# Patient Record
Sex: FEMALE | Race: ASIAN | NOT HISPANIC OR LATINO | URBAN - METROPOLITAN AREA
[De-identification: names, ages, dates, MRNs, and addresses within clinical notes are randomized per-mention and may not be internally consistent; named-entity substitution may affect disease eponyms.]

---

## 2021-03-16 ENCOUNTER — EMERGENCY (EMERGENCY)
Facility: HOSPITAL | Age: 46
LOS: 1 days | Discharge: HOME | End: 2021-03-16
Attending: EMERGENCY MEDICINE | Admitting: EMERGENCY MEDICINE
Payer: COMMERCIAL

## 2021-03-16 VITALS
HEART RATE: 99 BPM | TEMPERATURE: 98 F | SYSTOLIC BLOOD PRESSURE: 137 MMHG | OXYGEN SATURATION: 99 % | RESPIRATION RATE: 18 BRPM | DIASTOLIC BLOOD PRESSURE: 92 MMHG

## 2021-03-16 VITALS
TEMPERATURE: 99 F | HEIGHT: 64 IN | WEIGHT: 119.93 LBS | DIASTOLIC BLOOD PRESSURE: 99 MMHG | RESPIRATION RATE: 18 BRPM | OXYGEN SATURATION: 100 % | HEART RATE: 111 BPM | SYSTOLIC BLOOD PRESSURE: 142 MMHG

## 2021-03-16 LAB
APPEARANCE UR: CLEAR — SIGNIFICANT CHANGE UP
BACTERIA # UR AUTO: PRESENT /HPF
BILIRUB UR-MCNC: NEGATIVE — SIGNIFICANT CHANGE UP
COLOR SPEC: YELLOW — SIGNIFICANT CHANGE UP
DIFF PNL FLD: NEGATIVE — SIGNIFICANT CHANGE UP
EPI CELLS # UR: ABNORMAL /HPF (ref 0–5)
GLUCOSE UR QL: NEGATIVE — SIGNIFICANT CHANGE UP
HCG UR QL: NEGATIVE — SIGNIFICANT CHANGE UP
HYALINE CASTS # UR AUTO: SIGNIFICANT CHANGE UP /LPF (ref 0–2)
KETONES UR-MCNC: ABNORMAL MG/DL
LEUKOCYTE ESTERASE UR-ACNC: NEGATIVE — SIGNIFICANT CHANGE UP
NITRITE UR-MCNC: NEGATIVE — SIGNIFICANT CHANGE UP
PH UR: 7 — SIGNIFICANT CHANGE UP (ref 5–8)
PROT UR-MCNC: ABNORMAL MG/DL
RBC CASTS # UR COMP ASSIST: < 5 /HPF — SIGNIFICANT CHANGE UP
SP GR SPEC: 1.02 — SIGNIFICANT CHANGE UP (ref 1–1.03)
UROBILINOGEN FLD QL: 0.2 E.U./DL — SIGNIFICANT CHANGE UP
WBC UR QL: < 5 /HPF — SIGNIFICANT CHANGE UP

## 2021-03-16 PROCEDURE — 76856 US EXAM PELVIC COMPLETE: CPT | Mod: 26,59

## 2021-03-16 PROCEDURE — 76830 TRANSVAGINAL US NON-OB: CPT | Mod: 26

## 2021-03-16 PROCEDURE — 99285 EMERGENCY DEPT VISIT HI MDM: CPT

## 2021-03-16 RX ORDER — KETOROLAC TROMETHAMINE 30 MG/ML
30 SYRINGE (ML) INJECTION ONCE
Refills: 0 | Status: DISCONTINUED | OUTPATIENT
Start: 2021-03-16 | End: 2021-03-16

## 2021-03-16 RX ORDER — OXYCODONE AND ACETAMINOPHEN 5; 325 MG/1; MG/1
1 TABLET ORAL
Qty: 12 | Refills: 0
Start: 2021-03-16 | End: 2021-03-18

## 2021-03-16 RX ORDER — OXYCODONE AND ACETAMINOPHEN 5; 325 MG/1; MG/1
1 TABLET ORAL ONCE
Refills: 0 | Status: DISCONTINUED | OUTPATIENT
Start: 2021-03-16 | End: 2021-03-16

## 2021-03-16 RX ORDER — ONDANSETRON 8 MG/1
4 TABLET, FILM COATED ORAL ONCE
Refills: 0 | Status: COMPLETED | OUTPATIENT
Start: 2021-03-16 | End: 2021-03-16

## 2021-03-16 RX ORDER — IBUPROFEN 200 MG
1 TABLET ORAL
Qty: 21 | Refills: 0
Start: 2021-03-16 | End: 2021-03-22

## 2021-03-16 RX ADMIN — Medication 30 MILLIGRAM(S): at 12:57

## 2021-03-16 RX ADMIN — OXYCODONE AND ACETAMINOPHEN 1 TABLET(S): 5; 325 TABLET ORAL at 13:35

## 2021-03-16 RX ADMIN — Medication 30 MILLIGRAM(S): at 13:35

## 2021-03-16 RX ADMIN — OXYCODONE AND ACETAMINOPHEN 1 TABLET(S): 5; 325 TABLET ORAL at 12:57

## 2021-03-16 RX ADMIN — ONDANSETRON 4 MILLIGRAM(S): 8 TABLET, FILM COATED ORAL at 16:40

## 2021-03-16 NOTE — ED PROVIDER NOTE - CARE PROVIDER_API CALL
Raquel Lomas)  Gynecologic Oncology; Obstetrics and Gynecology  [address] [place] [address]  Phone: [phone]  Fax: [phone]  Follow Up Time: 4-6 Days

## 2021-03-16 NOTE — ED PROVIDER NOTE - PHYSICAL EXAMINATION
VITAL SIGNS: I have reviewed nursing notes and confirm.  CONSTITUTIONAL: Pt tearful; in no acute distress.   SKIN:  warm and dry, no acute rash.   HEAD:  normocephalic, atraumatic.  EYES: EOM intact; conjunctiva and sclera clear.  ENT: No nasal discharge; airway clear.   BACK: No midline spinal tenderness.   CARD: S1, S2 normal; no murmurs, gallops, or rubs. Regular rate and rhythm.   RESP:  Clear to auscultation b/l, no wheezes, rales or rhonchi.  ABD: Normal bowel sounds; soft; non-distended; non-tender; no guarding/ rebound.  EXT: Normal ROM. No clubbing, cyanosis or edema. 2+ pulses to b/l ue/le.  NEURO: Alert, oriented, grossly unremarkable. Normal sensation and strength in LE.   PSYCH: Cooperative, mood and affect appropriate.

## 2021-03-16 NOTE — ED PROVIDER NOTE - NSFOLLOWUPINSTRUCTIONS_ED_ALL_ED_FT
Endometriosis    WHAT YOU NEED TO KNOW:    Endometriosis is a condition in which tissue that is normally only in your uterus grows outside of the uterus. Endometriosis causes tissue that should be shed during a monthly period to grow on your ovaries, fallopian tubes, bladder, or other organs. Organs and tissue may stick together and cause inflammation and pain.    DISCHARGE INSTRUCTIONS:    Return to the emergency department if:   •You have severe pain that does not go away after you take pain medicine.          Contact your healthcare provider if:   •Your symptoms return after treatment.      •You have heavy or unusual vaginal bleeding.      •You see blood in your urine or bowel movement.      •You have questions or concerns about your condition or care.      Medicines:   •Hormones may help shrink endometrial tissue and decrease pain and inflammation. You may be given birth control pills, androgen hormones, or medicine that makes your body produce less of certain hormones.       •Acetaminophen decreases pain and is available without a doctor's order. Ask how much to take and how often to take it. Follow directions. Acetaminophen can cause liver damage if not taken correctly.      •NSAIDs, such as ibuprofen, help decrease swelling, pain, and fever. This medicine is available with or without a doctor's order. NSAIDs can cause stomach bleeding or kidney problems in certain people. If you take blood thinner medicine, always ask your healthcare provider if NSAIDs are safe for you. Always read the medicine label and follow directions.      •Take your medicine as directed. Contact your healthcare provider if you think your medicine is not helping or if you have side effects. Tell him or her if you are allergic to any medicine. Keep a list of the medicines, vitamins, and herbs you take. Include the amounts, and when and why you take them. Bring the list or the pill bottles to follow-up visits. Carry your medicine list with you in case of an emergency.      Self-care:   •Apply heat on your abdomen for 20 to 30 minutes every 2 hours for as many days as directed. Heat helps decrease pain and muscle spasms.      •Exercise regularly to help reduce symptoms, such as pain. Ask about the best exercise plan for you.       Follow up with your healthcare provider as directed: Write down your questions so you remember to ask them during your visits.        © Copyright [employer] 2021           back to top                          © Copyright [employer] 2021

## 2021-03-16 NOTE — ED PROVIDER NOTE - CHPI ED SYMPTOMS NEG
Denies Hx of pain like this, Hx kidney stones, FHx kidney stones, fevers, cough, CP, numbness, weakness, allergies to meds, pain radiating down to legs.

## 2021-03-16 NOTE — ED ADULT NURSE NOTE - OBJECTIVE STATEMENT
44 y/o female c/o neck and lower back pain since yesterday. pt states " I have back pain issues, I had a massage yesterday and then my back and neck got worst" pt denies fall/injuries. pt also endorsed " she just came off quarantine from covid 19 yesterday, had an MRI schedule for he back pain issues but had to cancel it because of covid"

## 2021-03-16 NOTE — ED PROVIDER NOTE - CADM PMH OBGYN IS PREGNANT YN
Unknown

## 2021-03-16 NOTE — ED PROVIDER NOTE - NOTES
knows patient well, has been long for endometriosis.  had ultrasound in Feb showed small endometrioma.  suggests getting another ultrasound today.  mri of back/pelvis already ordered, pt is getting tomorrow.  will call her back after sono results

## 2021-03-16 NOTE — ED PROVIDER NOTE - PATIENT PORTAL LINK FT
You can access the FollowMyHealth Patient Portal offered by [facility] by registering at the following website: [email or web address]. By joining [facility]’s FollowMyHealth portal, you will also be able to view your health information using other applications (apps) compatible with our system.

## 2021-03-16 NOTE — ED PROVIDER NOTE - CLINICAL SUMMARY MEDICAL DECISION MAKING FREE TEXT BOX
46 y/o F PMHx COVID (Dx 3/6/21) presents to the ED c/o worsening lower back pain, neck pain, back cramps, headache and nausea. Plan is for oral percocet and IM Toradol. Will discuss case w/ pt's PMD/ GYN Dr. Raquel Lomas. 44 y/o F PMHx COVID (Dx 3/6/21) presents to the ED c/o worsening lower back pain, neck pain, back cramps, headache and nausea. Plan is for oral percocet and IM Toradol. Will discuss case w/ pt's PMD/ GYN Dr. Raquel Lomas.  sono shows same endometrioma, unchanged in size, no torsion  pt feels better, has mri scheduled for tomorrow

## 2021-03-16 NOTE — ED PROVIDER NOTE - OBJECTIVE STATEMENT
44 y/o F PMHx COVID (Dx 3/6/21) presents to the ED c/o worsening lower back pain, neck pain, back cramps, headache and nausea. The back pain started yesterday, and the neck pain started this morning. Pt has an appointment w/ her "back doctor" Dr. Brandon Pugh for an MRI tomorrow.  ?(Pt's GYN Dr. Raquel Lomas referred pt to get an MRI.)? Pt taking Advil, Tylenol for pain w/ minimal relief w/ last dose this morning. Pt toko Baclofen for muscle pain w/ no relief last dose 2-3 H ago. Pt does not like taking narcotics for pain relief because she gets nauseous. Denies Hx of pain like this, Hx kidney stones, FHx kidney stones, fevers, cough, CP, numbness, weakness, allergies to meds, pain radiating down to legs.

## 2021-03-16 NOTE — ED ADULT NURSE NOTE - NSIMPLEMENTINTERV_GEN_ALL_ED
Implemented All Universal Safety Interventions:  [place] to call system. Call bell, personal items and telephone within reach. Instruct patient to call for assistance. Room bathroom lighting operational. Non-slip footwear when patient is off stretcher. Physically safe environment: no spills, clutter or unnecessary equipment. Stretcher in lowest position, wheels locked, appropriate side rails in place.

## 2021-03-16 NOTE — ED PROVIDER NOTE - CARE PLAN
[At Term] : at term [Normal Vaginal Route] : by normal vaginal route [None] : No maternal complications [Passed] : passed Principal Discharge DX:	Acute midline low back pain without sciatica  Secondary Diagnosis:	Endometriosis of right ovary

## 2021-03-19 DIAGNOSIS — R51.9 HEADACHE, UNSPECIFIED: ICD-10-CM

## 2021-03-19 DIAGNOSIS — M54.2 CERVICALGIA: ICD-10-CM

## 2021-03-19 DIAGNOSIS — N80.1 ENDOMETRIOSIS OF OVARY: ICD-10-CM

## 2021-03-19 DIAGNOSIS — M54.5 LOW BACK PAIN: ICD-10-CM

## 2021-03-19 DIAGNOSIS — R11.0 NAUSEA: ICD-10-CM

## 2021-03-19 PROCEDURE — 76856 US EXAM PELVIC COMPLETE: CPT

## 2021-03-19 PROCEDURE — 76830 TRANSVAGINAL US NON-OB: CPT

## 2021-03-19 PROCEDURE — 81025 URINE PREGNANCY TEST: CPT

## 2021-03-19 PROCEDURE — 99284 EMERGENCY DEPT VISIT MOD MDM: CPT | Mod: 25

## 2021-03-19 PROCEDURE — 96372 THER/PROPH/DIAG INJ SC/IM: CPT

## 2021-03-19 PROCEDURE — 81001 URINALYSIS AUTO W/SCOPE: CPT

## 2022-06-16 NOTE — ED ADULT NURSE NOTE - PAIN: BODY LOCATION
Right femoral artery angiogram was performed  using a Sheath 6fr 10cm 2.5cm Intro Snap On Dil Lock Kink by hand injection.  The access site was deemed to be appropriate for closure.  neck/back

## 2023-10-16 ENCOUNTER — INPATIENT (INPATIENT)
Facility: HOSPITAL | Age: 48
LOS: 6 days | Discharge: HOME | DRG: 872 | End: 2023-10-23
Attending: SURGERY | Admitting: SURGERY
Payer: COMMERCIAL

## 2023-10-16 VITALS
TEMPERATURE: 99 F | HEART RATE: 128 BPM | SYSTOLIC BLOOD PRESSURE: 90 MMHG | RESPIRATION RATE: 18 BRPM | DIASTOLIC BLOOD PRESSURE: 63 MMHG | WEIGHT: 134.92 LBS | OXYGEN SATURATION: 97 % | HEIGHT: 64 IN

## 2023-10-16 DIAGNOSIS — Z90.49 ACQUIRED ABSENCE OF OTHER SPECIFIED PARTS OF DIGESTIVE TRACT: Chronic | ICD-10-CM

## 2023-10-16 PROBLEM — U07.1 COVID-19: Chronic | Status: ACTIVE | Noted: 2021-03-16

## 2023-10-16 LAB
ALBUMIN SERPL ELPH-MCNC: 3.9 G/DL — SIGNIFICANT CHANGE UP (ref 3.3–5)
ALBUMIN SERPL ELPH-MCNC: 3.9 G/DL — SIGNIFICANT CHANGE UP (ref 3.3–5)
ALP SERPL-CCNC: 135 U/L — HIGH (ref 40–120)
ALP SERPL-CCNC: 135 U/L — HIGH (ref 40–120)
ALT FLD-CCNC: 58 U/L — HIGH (ref 10–45)
ALT FLD-CCNC: 58 U/L — HIGH (ref 10–45)
AMPHET UR-MCNC: POSITIVE
AMPHET UR-MCNC: POSITIVE
ANION GAP SERPL CALC-SCNC: 21 MMOL/L — HIGH (ref 5–17)
ANION GAP SERPL CALC-SCNC: 21 MMOL/L — HIGH (ref 5–17)
ANISOCYTOSIS BLD QL: SLIGHT — SIGNIFICANT CHANGE UP
ANISOCYTOSIS BLD QL: SLIGHT — SIGNIFICANT CHANGE UP
APPEARANCE UR: CLEAR — SIGNIFICANT CHANGE UP
APPEARANCE UR: CLEAR — SIGNIFICANT CHANGE UP
APTT BLD: 31 SEC — SIGNIFICANT CHANGE UP (ref 24.5–35.6)
APTT BLD: 31 SEC — SIGNIFICANT CHANGE UP (ref 24.5–35.6)
APTT BLD: 31.2 SEC — SIGNIFICANT CHANGE UP (ref 24.5–35.6)
APTT BLD: 31.2 SEC — SIGNIFICANT CHANGE UP (ref 24.5–35.6)
AST SERPL-CCNC: 90 U/L — HIGH (ref 10–40)
AST SERPL-CCNC: 90 U/L — HIGH (ref 10–40)
BACTERIA # UR AUTO: PRESENT /HPF
BACTERIA # UR AUTO: PRESENT /HPF
BARBITURATES UR SCN-MCNC: NEGATIVE — SIGNIFICANT CHANGE UP
BARBITURATES UR SCN-MCNC: NEGATIVE — SIGNIFICANT CHANGE UP
BASE EXCESS BLDV CALC-SCNC: -2 MMOL/L — SIGNIFICANT CHANGE UP (ref -2–3)
BASE EXCESS BLDV CALC-SCNC: -2 MMOL/L — SIGNIFICANT CHANGE UP (ref -2–3)
BASOPHILS # BLD AUTO: 0 K/UL — SIGNIFICANT CHANGE UP (ref 0–0.2)
BASOPHILS # BLD AUTO: 0 K/UL — SIGNIFICANT CHANGE UP (ref 0–0.2)
BASOPHILS NFR BLD AUTO: 0 % — SIGNIFICANT CHANGE UP (ref 0–2)
BASOPHILS NFR BLD AUTO: 0 % — SIGNIFICANT CHANGE UP (ref 0–2)
BENZODIAZ UR-MCNC: NEGATIVE — SIGNIFICANT CHANGE UP
BENZODIAZ UR-MCNC: NEGATIVE — SIGNIFICANT CHANGE UP
BILIRUB DIRECT SERPL-MCNC: 0.9 MG/DL — HIGH (ref 0–0.3)
BILIRUB DIRECT SERPL-MCNC: 0.9 MG/DL — HIGH (ref 0–0.3)
BILIRUB INDIRECT FLD-MCNC: 1.6 MG/DL — HIGH (ref 0.2–1)
BILIRUB INDIRECT FLD-MCNC: 1.6 MG/DL — HIGH (ref 0.2–1)
BILIRUB SERPL-MCNC: 2.5 MG/DL — HIGH (ref 0.2–1.2)
BILIRUB UR-MCNC: ABNORMAL
BILIRUB UR-MCNC: ABNORMAL
BLD GP AB SCN SERPL QL: NEGATIVE — SIGNIFICANT CHANGE UP
BLD GP AB SCN SERPL QL: NEGATIVE — SIGNIFICANT CHANGE UP
BUN SERPL-MCNC: 13 MG/DL — SIGNIFICANT CHANGE UP (ref 7–23)
BUN SERPL-MCNC: 13 MG/DL — SIGNIFICANT CHANGE UP (ref 7–23)
CA-I SERPL-SCNC: 1.18 MMOL/L — SIGNIFICANT CHANGE UP (ref 1.15–1.33)
CA-I SERPL-SCNC: 1.18 MMOL/L — SIGNIFICANT CHANGE UP (ref 1.15–1.33)
CALCIUM SERPL-MCNC: 9.4 MG/DL — SIGNIFICANT CHANGE UP (ref 8.4–10.5)
CALCIUM SERPL-MCNC: 9.4 MG/DL — SIGNIFICANT CHANGE UP (ref 8.4–10.5)
CHLORIDE SERPL-SCNC: 102 MMOL/L — SIGNIFICANT CHANGE UP (ref 96–108)
CHLORIDE SERPL-SCNC: 102 MMOL/L — SIGNIFICANT CHANGE UP (ref 96–108)
CO2 BLDV-SCNC: 24.3 MMOL/L — SIGNIFICANT CHANGE UP (ref 22–26)
CO2 BLDV-SCNC: 24.3 MMOL/L — SIGNIFICANT CHANGE UP (ref 22–26)
CO2 SERPL-SCNC: 19 MMOL/L — LOW (ref 22–31)
CO2 SERPL-SCNC: 19 MMOL/L — LOW (ref 22–31)
COCAINE METAB.OTHER UR-MCNC: NEGATIVE — SIGNIFICANT CHANGE UP
COCAINE METAB.OTHER UR-MCNC: NEGATIVE — SIGNIFICANT CHANGE UP
COLOR SPEC: YELLOW — SIGNIFICANT CHANGE UP
COLOR SPEC: YELLOW — SIGNIFICANT CHANGE UP
CREAT SERPL-MCNC: 1.01 MG/DL — SIGNIFICANT CHANGE UP (ref 0.5–1.3)
CREAT SERPL-MCNC: 1.01 MG/DL — SIGNIFICANT CHANGE UP (ref 0.5–1.3)
DACRYOCYTES BLD QL SMEAR: SLIGHT — SIGNIFICANT CHANGE UP
DACRYOCYTES BLD QL SMEAR: SLIGHT — SIGNIFICANT CHANGE UP
DIFF PNL FLD: ABNORMAL
DIFF PNL FLD: ABNORMAL
EGFR: 68 ML/MIN/1.73M2 — SIGNIFICANT CHANGE UP
EGFR: 68 ML/MIN/1.73M2 — SIGNIFICANT CHANGE UP
EOSINOPHIL # BLD AUTO: 0 K/UL — SIGNIFICANT CHANGE UP (ref 0–0.5)
EOSINOPHIL # BLD AUTO: 0 K/UL — SIGNIFICANT CHANGE UP (ref 0–0.5)
EOSINOPHIL NFR BLD AUTO: 0 % — SIGNIFICANT CHANGE UP (ref 0–6)
EOSINOPHIL NFR BLD AUTO: 0 % — SIGNIFICANT CHANGE UP (ref 0–6)
EPI CELLS # UR: SIGNIFICANT CHANGE UP /HPF (ref 0–5)
EPI CELLS # UR: SIGNIFICANT CHANGE UP /HPF (ref 0–5)
GAS PNL BLDV: 140 MMOL/L — SIGNIFICANT CHANGE UP (ref 136–145)
GAS PNL BLDV: 140 MMOL/L — SIGNIFICANT CHANGE UP (ref 136–145)
GAS PNL BLDV: SIGNIFICANT CHANGE UP
GAS PNL BLDV: SIGNIFICANT CHANGE UP
GIANT PLATELETS BLD QL SMEAR: PRESENT — SIGNIFICANT CHANGE UP
GIANT PLATELETS BLD QL SMEAR: PRESENT — SIGNIFICANT CHANGE UP
GLUCOSE SERPL-MCNC: 118 MG/DL — HIGH (ref 70–99)
GLUCOSE SERPL-MCNC: 118 MG/DL — HIGH (ref 70–99)
GLUCOSE UR QL: NEGATIVE — SIGNIFICANT CHANGE UP
GLUCOSE UR QL: NEGATIVE — SIGNIFICANT CHANGE UP
HCG SERPL-ACNC: 0 MIU/ML — SIGNIFICANT CHANGE UP
HCG SERPL-ACNC: 0 MIU/ML — SIGNIFICANT CHANGE UP
HCO3 BLDV-SCNC: 23 MMOL/L — SIGNIFICANT CHANGE UP (ref 22–29)
HCO3 BLDV-SCNC: 23 MMOL/L — SIGNIFICANT CHANGE UP (ref 22–29)
HCT VFR BLD CALC: 38.6 % — SIGNIFICANT CHANGE UP (ref 34.5–45)
HCT VFR BLD CALC: 38.6 % — SIGNIFICANT CHANGE UP (ref 34.5–45)
HGB BLD-MCNC: 12.6 G/DL — SIGNIFICANT CHANGE UP (ref 11.5–15.5)
HGB BLD-MCNC: 12.6 G/DL — SIGNIFICANT CHANGE UP (ref 11.5–15.5)
INR BLD: 1.33 — HIGH (ref 0.85–1.18)
INR BLD: 1.33 — HIGH (ref 0.85–1.18)
INR BLD: 1.34 — HIGH (ref 0.85–1.18)
INR BLD: 1.34 — HIGH (ref 0.85–1.18)
KETONES UR-MCNC: NEGATIVE — SIGNIFICANT CHANGE UP
KETONES UR-MCNC: NEGATIVE — SIGNIFICANT CHANGE UP
LACTATE SERPL-SCNC: 5.6 MMOL/L — CRITICAL HIGH (ref 0.5–2)
LACTATE SERPL-SCNC: 7 MMOL/L — CRITICAL HIGH (ref 0.5–2)
LACTATE SERPL-SCNC: 7 MMOL/L — CRITICAL HIGH (ref 0.5–2)
LEUKOCYTE ESTERASE UR-ACNC: ABNORMAL
LEUKOCYTE ESTERASE UR-ACNC: ABNORMAL
LYMPHOCYTES # BLD AUTO: 0.47 K/UL — LOW (ref 1–3.3)
LYMPHOCYTES # BLD AUTO: 0.47 K/UL — LOW (ref 1–3.3)
LYMPHOCYTES # BLD AUTO: 3.4 % — LOW (ref 13–44)
LYMPHOCYTES # BLD AUTO: 3.4 % — LOW (ref 13–44)
MANUAL SMEAR VERIFICATION: SIGNIFICANT CHANGE UP
MANUAL SMEAR VERIFICATION: SIGNIFICANT CHANGE UP
MCHC RBC-ENTMCNC: 32 PG — SIGNIFICANT CHANGE UP (ref 27–34)
MCHC RBC-ENTMCNC: 32 PG — SIGNIFICANT CHANGE UP (ref 27–34)
MCHC RBC-ENTMCNC: 32.6 GM/DL — SIGNIFICANT CHANGE UP (ref 32–36)
MCHC RBC-ENTMCNC: 32.6 GM/DL — SIGNIFICANT CHANGE UP (ref 32–36)
MCV RBC AUTO: 98 FL — SIGNIFICANT CHANGE UP (ref 80–100)
MCV RBC AUTO: 98 FL — SIGNIFICANT CHANGE UP (ref 80–100)
METHADONE UR-MCNC: NEGATIVE — SIGNIFICANT CHANGE UP
METHADONE UR-MCNC: NEGATIVE — SIGNIFICANT CHANGE UP
MICROCYTES BLD QL: SLIGHT — SIGNIFICANT CHANGE UP
MICROCYTES BLD QL: SLIGHT — SIGNIFICANT CHANGE UP
MONOCYTES # BLD AUTO: 0.82 K/UL — SIGNIFICANT CHANGE UP (ref 0–0.9)
MONOCYTES # BLD AUTO: 0.82 K/UL — SIGNIFICANT CHANGE UP (ref 0–0.9)
MONOCYTES NFR BLD AUTO: 6 % — SIGNIFICANT CHANGE UP (ref 2–14)
MONOCYTES NFR BLD AUTO: 6 % — SIGNIFICANT CHANGE UP (ref 2–14)
MYELOCYTES NFR BLD: 6.9 % — HIGH (ref 0–0)
MYELOCYTES NFR BLD: 6.9 % — HIGH (ref 0–0)
NEUTROPHILS # BLD AUTO: 11.48 K/UL — HIGH (ref 1.8–7.4)
NEUTROPHILS # BLD AUTO: 11.48 K/UL — HIGH (ref 1.8–7.4)
NEUTROPHILS NFR BLD AUTO: 69 % — SIGNIFICANT CHANGE UP (ref 43–77)
NEUTROPHILS NFR BLD AUTO: 69 % — SIGNIFICANT CHANGE UP (ref 43–77)
NEUTS BAND # BLD: 14.7 % — HIGH (ref 0–8)
NEUTS BAND # BLD: 14.7 % — HIGH (ref 0–8)
NITRITE UR-MCNC: NEGATIVE — SIGNIFICANT CHANGE UP
NITRITE UR-MCNC: NEGATIVE — SIGNIFICANT CHANGE UP
OPIATES UR-MCNC: POSITIVE
OPIATES UR-MCNC: POSITIVE
OVALOCYTES BLD QL SMEAR: SLIGHT — SIGNIFICANT CHANGE UP
OVALOCYTES BLD QL SMEAR: SLIGHT — SIGNIFICANT CHANGE UP
PCO2 BLDV: 40 MMHG — SIGNIFICANT CHANGE UP (ref 39–42)
PCO2 BLDV: 40 MMHG — SIGNIFICANT CHANGE UP (ref 39–42)
PCP SPEC-MCNC: SIGNIFICANT CHANGE UP
PCP SPEC-MCNC: SIGNIFICANT CHANGE UP
PCP UR-MCNC: NEGATIVE — SIGNIFICANT CHANGE UP
PCP UR-MCNC: NEGATIVE — SIGNIFICANT CHANGE UP
PH BLDV: 7.37 — SIGNIFICANT CHANGE UP (ref 7.32–7.43)
PH BLDV: 7.37 — SIGNIFICANT CHANGE UP (ref 7.32–7.43)
PH UR: 6.5 — SIGNIFICANT CHANGE UP (ref 5–8)
PH UR: 6.5 — SIGNIFICANT CHANGE UP (ref 5–8)
PLAT MORPH BLD: ABNORMAL
PLAT MORPH BLD: ABNORMAL
PLATELET # BLD AUTO: 265 K/UL — SIGNIFICANT CHANGE UP (ref 150–400)
PLATELET # BLD AUTO: 265 K/UL — SIGNIFICANT CHANGE UP (ref 150–400)
PO2 BLDV: <33 MMHG — SIGNIFICANT CHANGE UP (ref 25–45)
PO2 BLDV: <33 MMHG — SIGNIFICANT CHANGE UP (ref 25–45)
POIKILOCYTOSIS BLD QL AUTO: SLIGHT — SIGNIFICANT CHANGE UP
POIKILOCYTOSIS BLD QL AUTO: SLIGHT — SIGNIFICANT CHANGE UP
POLYCHROMASIA BLD QL SMEAR: SIGNIFICANT CHANGE UP
POLYCHROMASIA BLD QL SMEAR: SIGNIFICANT CHANGE UP
POTASSIUM BLDV-SCNC: 3.6 MMOL/L — SIGNIFICANT CHANGE UP (ref 3.5–5.1)
POTASSIUM BLDV-SCNC: 3.6 MMOL/L — SIGNIFICANT CHANGE UP (ref 3.5–5.1)
POTASSIUM SERPL-MCNC: 3.5 MMOL/L — SIGNIFICANT CHANGE UP (ref 3.5–5.3)
POTASSIUM SERPL-MCNC: 3.5 MMOL/L — SIGNIFICANT CHANGE UP (ref 3.5–5.3)
POTASSIUM SERPL-SCNC: 3.5 MMOL/L — SIGNIFICANT CHANGE UP (ref 3.5–5.3)
POTASSIUM SERPL-SCNC: 3.5 MMOL/L — SIGNIFICANT CHANGE UP (ref 3.5–5.3)
PROT SERPL-MCNC: 7.2 G/DL — SIGNIFICANT CHANGE UP (ref 6–8.3)
PROT SERPL-MCNC: 7.2 G/DL — SIGNIFICANT CHANGE UP (ref 6–8.3)
PROT UR-MCNC: ABNORMAL MG/DL
PROT UR-MCNC: ABNORMAL MG/DL
PROTHROM AB SERPL-ACNC: 15 SEC — HIGH (ref 9.5–13)
PROTHROM AB SERPL-ACNC: 15 SEC — HIGH (ref 9.5–13)
PROTHROM AB SERPL-ACNC: 15.2 SEC — HIGH (ref 9.5–13)
PROTHROM AB SERPL-ACNC: 15.2 SEC — HIGH (ref 9.5–13)
RBC # BLD: 3.94 M/UL — SIGNIFICANT CHANGE UP (ref 3.8–5.2)
RBC # BLD: 3.94 M/UL — SIGNIFICANT CHANGE UP (ref 3.8–5.2)
RBC # FLD: 13.1 % — SIGNIFICANT CHANGE UP (ref 10.3–14.5)
RBC # FLD: 13.1 % — SIGNIFICANT CHANGE UP (ref 10.3–14.5)
RBC BLD AUTO: ABNORMAL
RBC BLD AUTO: ABNORMAL
RBC CASTS # UR COMP ASSIST: < 5 /HPF — SIGNIFICANT CHANGE UP
RBC CASTS # UR COMP ASSIST: < 5 /HPF — SIGNIFICANT CHANGE UP
RH IG SCN BLD-IMP: POSITIVE — SIGNIFICANT CHANGE UP
RH IG SCN BLD-IMP: POSITIVE — SIGNIFICANT CHANGE UP
SAO2 % BLDV: 51.9 % — LOW (ref 67–88)
SAO2 % BLDV: 51.9 % — LOW (ref 67–88)
SCHISTOCYTES BLD QL AUTO: SLIGHT — SIGNIFICANT CHANGE UP
SCHISTOCYTES BLD QL AUTO: SLIGHT — SIGNIFICANT CHANGE UP
SODIUM SERPL-SCNC: 142 MMOL/L — SIGNIFICANT CHANGE UP (ref 135–145)
SODIUM SERPL-SCNC: 142 MMOL/L — SIGNIFICANT CHANGE UP (ref 135–145)
SP GR SPEC: <=1.005 — SIGNIFICANT CHANGE UP (ref 1–1.03)
SP GR SPEC: <=1.005 — SIGNIFICANT CHANGE UP (ref 1–1.03)
THC UR QL: POSITIVE
THC UR QL: POSITIVE
UROBILINOGEN FLD QL: 1 E.U./DL — SIGNIFICANT CHANGE UP
UROBILINOGEN FLD QL: 1 E.U./DL — SIGNIFICANT CHANGE UP
WBC # BLD: 13.71 K/UL — HIGH (ref 3.8–10.5)
WBC # BLD: 13.71 K/UL — HIGH (ref 3.8–10.5)
WBC # FLD AUTO: 13.71 K/UL — HIGH (ref 3.8–10.5)
WBC # FLD AUTO: 13.71 K/UL — HIGH (ref 3.8–10.5)
WBC UR QL: ABNORMAL /HPF
WBC UR QL: ABNORMAL /HPF

## 2023-10-16 PROCEDURE — 74177 CT ABD & PELVIS W/CONTRAST: CPT | Mod: 26,MA

## 2023-10-16 PROCEDURE — 99291 CRITICAL CARE FIRST HOUR: CPT

## 2023-10-16 PROCEDURE — 93010 ELECTROCARDIOGRAM REPORT: CPT

## 2023-10-16 PROCEDURE — 99221 1ST HOSP IP/OBS SF/LOW 40: CPT

## 2023-10-16 RX ORDER — SODIUM CHLORIDE 9 MG/ML
1000 INJECTION, SOLUTION INTRAVENOUS ONCE
Refills: 0 | Status: COMPLETED | OUTPATIENT
Start: 2023-10-16 | End: 2023-10-16

## 2023-10-16 RX ORDER — INSULIN LISPRO 100/ML
VIAL (ML) SUBCUTANEOUS EVERY 6 HOURS
Refills: 0 | Status: DISCONTINUED | OUTPATIENT
Start: 2023-10-16 | End: 2023-10-18

## 2023-10-16 RX ORDER — THIAMINE MONONITRATE (VIT B1) 100 MG
100 TABLET ORAL DAILY
Refills: 0 | Status: DISCONTINUED | OUTPATIENT
Start: 2023-10-16 | End: 2023-10-23

## 2023-10-16 RX ORDER — IOHEXOL 300 MG/ML
30 INJECTION, SOLUTION INTRAVENOUS ONCE
Refills: 0 | Status: COMPLETED | OUTPATIENT
Start: 2023-10-16 | End: 2023-10-16

## 2023-10-16 RX ORDER — PIPERACILLIN AND TAZOBACTAM 4; .5 G/20ML; G/20ML
3.38 INJECTION, POWDER, LYOPHILIZED, FOR SOLUTION INTRAVENOUS ONCE
Refills: 0 | Status: COMPLETED | OUTPATIENT
Start: 2023-10-16 | End: 2023-10-16

## 2023-10-16 RX ORDER — ONDANSETRON 8 MG/1
4 TABLET, FILM COATED ORAL ONCE
Refills: 0 | Status: COMPLETED | OUTPATIENT
Start: 2023-10-16 | End: 2023-10-16

## 2023-10-16 RX ORDER — SODIUM CHLORIDE 9 MG/ML
1000 INJECTION INTRAMUSCULAR; INTRAVENOUS; SUBCUTANEOUS ONCE
Refills: 0 | Status: COMPLETED | OUTPATIENT
Start: 2023-10-16 | End: 2023-10-16

## 2023-10-16 RX ORDER — PIPERACILLIN AND TAZOBACTAM 4; .5 G/20ML; G/20ML
3.38 INJECTION, POWDER, LYOPHILIZED, FOR SOLUTION INTRAVENOUS EVERY 8 HOURS
Refills: 0 | Status: DISCONTINUED | OUTPATIENT
Start: 2023-10-17 | End: 2023-10-20

## 2023-10-16 RX ORDER — MORPHINE SULFATE 50 MG/1
4 CAPSULE, EXTENDED RELEASE ORAL ONCE
Refills: 0 | Status: DISCONTINUED | OUTPATIENT
Start: 2023-10-16 | End: 2023-10-16

## 2023-10-16 RX ORDER — FOLIC ACID 0.8 MG
1 TABLET ORAL DAILY
Refills: 0 | Status: DISCONTINUED | OUTPATIENT
Start: 2023-10-16 | End: 2023-10-23

## 2023-10-16 RX ORDER — ACETAMINOPHEN 500 MG
650 TABLET ORAL ONCE
Refills: 0 | Status: COMPLETED | OUTPATIENT
Start: 2023-10-16 | End: 2023-10-16

## 2023-10-16 RX ORDER — ACETAMINOPHEN 500 MG
1000 TABLET ORAL ONCE
Refills: 0 | Status: COMPLETED | OUTPATIENT
Start: 2023-10-16 | End: 2023-10-17

## 2023-10-16 RX ORDER — CHLORHEXIDINE GLUCONATE 213 G/1000ML
1 SOLUTION TOPICAL
Refills: 0 | Status: DISCONTINUED | OUTPATIENT
Start: 2023-10-16 | End: 2023-10-17

## 2023-10-16 RX ORDER — ENOXAPARIN SODIUM 100 MG/ML
40 INJECTION SUBCUTANEOUS EVERY 24 HOURS
Refills: 0 | Status: DISCONTINUED | OUTPATIENT
Start: 2023-10-17 | End: 2023-10-23

## 2023-10-16 RX ORDER — PANTOPRAZOLE SODIUM 20 MG/1
40 TABLET, DELAYED RELEASE ORAL DAILY
Refills: 0 | Status: DISCONTINUED | OUTPATIENT
Start: 2023-10-16 | End: 2023-10-23

## 2023-10-16 RX ORDER — SODIUM CHLORIDE 9 MG/ML
1900 INJECTION INTRAMUSCULAR; INTRAVENOUS; SUBCUTANEOUS ONCE
Refills: 0 | Status: COMPLETED | OUTPATIENT
Start: 2023-10-16 | End: 2023-10-16

## 2023-10-16 RX ORDER — ENOXAPARIN SODIUM 100 MG/ML
40 INJECTION SUBCUTANEOUS EVERY 24 HOURS
Refills: 0 | Status: DISCONTINUED | OUTPATIENT
Start: 2023-10-16 | End: 2023-10-16

## 2023-10-16 RX ADMIN — ONDANSETRON 4 MILLIGRAM(S): 8 TABLET, FILM COATED ORAL at 17:02

## 2023-10-16 RX ADMIN — MORPHINE SULFATE 4 MILLIGRAM(S): 50 CAPSULE, EXTENDED RELEASE ORAL at 17:02

## 2023-10-16 RX ADMIN — IOHEXOL 30 MILLILITER(S): 300 INJECTION, SOLUTION INTRAVENOUS at 17:02

## 2023-10-16 RX ADMIN — SODIUM CHLORIDE 1000 MILLILITER(S): 9 INJECTION, SOLUTION INTRAVENOUS at 21:59

## 2023-10-16 RX ADMIN — PIPERACILLIN AND TAZOBACTAM 25 GRAM(S): 4; .5 INJECTION, POWDER, LYOPHILIZED, FOR SOLUTION INTRAVENOUS at 18:04

## 2023-10-16 RX ADMIN — SODIUM CHLORIDE 1000 MILLILITER(S): 9 INJECTION INTRAMUSCULAR; INTRAVENOUS; SUBCUTANEOUS at 18:58

## 2023-10-16 RX ADMIN — SODIUM CHLORIDE 1900 MILLILITER(S): 9 INJECTION INTRAMUSCULAR; INTRAVENOUS; SUBCUTANEOUS at 17:40

## 2023-10-16 NOTE — ED PROVIDER NOTE - CLINICAL SUMMARY MEDICAL DECISION MAKING FREE TEXT BOX
Pt p/w abd pain, uncomfortable, diffusely tender. IUD, still w/ menses. Bedside FAST neg for intraperitoneal FF to suggest ruptured ectopic. DDx includes appendicitis, diverticulitis, infectious colitis, other pathology. Initial labs w/ elevated lactate and WBC, low grade temp rectally (100.3). Relative hypotension, but maintaining MAP > 65. Sepsis fluid resuscitation and abx initiated. Close monitoring. Surgery consult. Pending CT a/p.

## 2023-10-16 NOTE — CONSULT NOTE ADULT - SUBJECTIVE AND OBJECTIVE BOX
49yo  with PMHx of HTN, HLD and anxiety presenting with acute-onset severe abdominal pain. She states that the pain woke her up this AM. She had intercourse last evening and that this morning she was awoken by sharp, throbbing lower abdominal pain. States she had never had pain like this prior and that pain was associated with chills/sweats, but denied nausea, emesis, sob, palpitations or syncope. Reports pain was also associated with significant abdominal distention. States pain was not relieved by ibuprofen and was progressively worsening. She thought she had potentially dislodged her IUD after intercourse so she initially presented to her GYN, Dr. Lomas. She was evalauted there and sent into the ED for further evaluation given degree of pain and abdominal distention. Pt seen in SICU after fluid resuscitation and pain medication, reports that pain has greatly improved. During interview patient had an episode of fecal incontinence. She describes that that occasionally has happened but not recently. Pt denies fever, chills, chest pain, SOB, nausea, vomiting, vaginal bleeding, vaginal discharge, dysuria, constipation.       OB/GYN Hx:   , G2  FAVD  Denies hx of abnormal pap smears, stds, fibroids. Follows with GYN Dr. Lomas.     PMHx: HTN, HLD, ADHD, anxiety  SHx: L/S appendectomy  Meds: Lisinopril 10mg qd, Statin, Vivans 60mg qd, famotidine, seroquil 100mg qd, saphris 5mg qd  Allergies: NKDA    Social hx: Pt reports 2 glasses wine/day. 2 cigarettes/day. Occasional marijuana use. Recent divorce hx.     PHYSICAL EXAM:   Vital Signs Last 24 Hrs  T(C): 36.7 (16 Oct 2023 22:44), Max: 37.9 (16 Oct 2023 17:12)  T(F): 98.1 (16 Oct 2023 22:44), Max: 100.3 (16 Oct 2023 17:12)  HR: 112 (16 Oct 2023 23:00) (112 - 128)  BP: 108/64 (16 Oct 2023 23:00) (81/50 - 113/70)  BP(mean): 81 (16 Oct 2023 23:00) (65 - 85)  RR: 30 (16 Oct 2023 23:00) (18 - 30)  SpO2: 95% (16 Oct 2023 23:00) (95% - 99%)    Parameters below as of 16 Oct 2023 20:47  Patient On (Oxygen Delivery Method): room air    **************************  Constitutional: NAD, resting in bed  Respiratory: Breathing well on RA  Cardiovascular: tachycardic  Gastrointestinal: soft, mild tenderness to palpation diffusely, positive bowel sounds, no rebound or guarding   Pelvic exam: deferred, no GYN complaints at this time  Extremities: no calf tenderness or swelling    LABS:                        12.6   13.71 )-----------( 265      ( 16 Oct 2023 17:02 )             38.6     10-    142  |  102  |  13  ----------------------------<  118<H>  3.5   |  19<L>  |  1.01    Ca    9.4      16 Oct 2023 17:02    TPro  7.2  /  Alb  3.9  /  TBili  2.5<H>  /  DBili  0.9<H>  /  AST  90<H>  /  ALT  58<H>  /  AlkPhos  135<H>  10-16    PT/INR - ( 16 Oct 2023 20:47 )   PT: 15.0 sec;   INR: 1.33          PTT - ( 16 Oct 2023 20:47 )  PTT:31.2 sec  Urinalysis Basic - ( 16 Oct 2023 20:41 )    Color: Yellow / Appearance: Clear / SG: <=1.005 / pH: x  Gluc: x / Ketone: NEGATIVE  / Bili: Small / Urobili: 1.0 E.U./dL   Blood: x / Protein: Trace mg/dL / Nitrite: NEGATIVE   Leuk Esterase: Trace / RBC: < 5 /HPF / WBC 5-10 /HPF   Sq Epi: x / Non Sq Epi: x / Bacteria: Present /HPF      HCG Quantitative, Serum: 0 mIU/mL (10-16 @ 17:02)      RADIOLOGY & ADDITIONAL STUDIES:    ACC: [id number] EXAM:  CT ABDOMEN AND PELVIS OC IC   ORDERED BY: MIGUEL ANGEL SCHREIBER     PROCEDURE DATE:  10/16/2023          INTERPRETATION:  CLINICAL INFORMATION: Diffuse abdominal pain and   distention with hypotension    COMPARISON: None.    CONTRAST/COMPLICATIONS:  IV Contrast: Isovue 370  90 cc administered   10 cc discarded  Oral Contrast: Omnipaque 350  Complications: None reported at time of study completion    PROCEDURE:  CT of the Abdomen and Pelvis was performed.  Sagittal and coronal reformats were performed.    FINDINGS:  LOWER CHEST: Partially visualized bilateral breast implants. Mild   dependent density at the lung bases.    LIVER: Hepatomegaly  BILE DUCTS: Normal caliber.  GALLBLADDER: Within normal limits.  SPLEEN: Withinnormal limits.  PANCREAS: Within normal limits.  ADRENALS: Within normal limits.  KIDNEYS/URETERS: Within normal limits.    BLADDER: Within normal limits.  REPRODUCTIVE ORGANS: IUD in the uterus. No adnexal mass    BOWEL: No bowel obstruction. Appendix is not seen and may be surgically   absent. There is mild small bowel prominence with wall thickening in the   mid small bowel suggestive of enteritis. Fluid in the colon may be seen   with diarrhea  PERITONEUM: No ascites.  VESSELS: Within normal limits.  RETROPERITONEUM/LYMPH NODES: No lymphadenopathy.  ABDOMINAL WALL: Within normal limits.  BONES: Postoperative changes in the lower lumbar spine with posterior   fusion hardware at L4 and L5    IMPRESSION: Findings are suggestive of enteritis    --- End of Report ---            GAGE ODONNELL MD; Attending Radiologist  This document has been electronically signed. Oct 16 2023  7:28PM     47yo  with PMHx of HTN, HLD and anxiety presenting with acute-onset severe abdominal pain. She states that the pain woke her up this AM. She had intercourse last evening and that this morning she was awoken by sharp, throbbing lower abdominal pain. States she had never had pain like this prior and that pain was associated with chills/sweats, but denied nausea, emesis, sob, palpitations or syncope. Reports pain was also associated with significant abdominal distention. States pain was not relieved by ibuprofen and was progressively worsening. She thought she had potentially dislodged her IUD after intercourse so she initially presented to her GYN, Dr. Lomas. She was evalauted there and sent into the ED for further evaluation given degree of pain and abdominal distention. Pt seen in SICU after fluid resuscitation and pain medication, reports that pain has greatly improved. During interview patient had an episode of fecal incontinence. She describes that that occasionally has happened but not recently. Pt denies fever, chills, chest pain, SOB, nausea, vomiting, vaginal bleeding, vaginal discharge, dysuria, constipation.       OB/GYN Hx:   , G2  FAVD  Denies hx of abnormal pap smears, stds, fibroids. Follows with GYN Dr. Lomas.     PMHx: HTN, HLD, ADHD, anxiety  SHx: L/S appendectomy  Meds: Lisinopril 10mg qd, Statin, Vivans 60mg qd, famotidine, seroquil 100mg qd, saphris 5mg qd  Allergies: NKDA    Social hx: Pt reports 2 glasses wine/day. 2 cigarettes/day. Occasional marijuana use. Recent divorce hx.     PHYSICAL EXAM:   Vital Signs Last 24 Hrs  T(C): 36.7 (16 Oct 2023 22:44), Max: 37.9 (16 Oct 2023 17:12)  T(F): 98.1 (16 Oct 2023 22:44), Max: 100.3 (16 Oct 2023 17:12)  HR: 112 (16 Oct 2023 23:00) (112 - 128)  BP: 108/64 (16 Oct 2023 23:00) (81/50 - 113/70)  BP(mean): 81 (16 Oct 2023 23:00) (65 - 85)  RR: 30 (16 Oct 2023 23:00) (18 - 30)  SpO2: 95% (16 Oct 2023 23:00) (95% - 99%)    Parameters below as of 16 Oct 2023 20:47  Patient On (Oxygen Delivery Method): room air    **************************  Constitutional: NAD, resting in bed  Respiratory: Breathing well on RA  Cardiovascular: tachycardic  Gastrointestinal: soft, mild tenderness to palpation diffusely, positive bowel sounds, no rebound or guarding   Pelvic exam: SSE completed. Cervix appears wnl, IUD strings visable. No discharge or bleeding noted. No CMT or adnexal tenderness on bimanual exam.   Extremities: no calf tenderness or swelling    LABS:                        12.6   13.71 )-----------( 265      ( 16 Oct 2023 17:02 )             38.6     10    142  |  102  |  13  ----------------------------<  118<H>  3.5   |  19<L>  |  1.01    Ca    9.4      16 Oct 2023 17:02    TPro  7.2  /  Alb  3.9  /  TBili  2.5<H>  /  DBili  0.9<H>  /  AST  90<H>  /  ALT  58<H>  /  AlkPhos  135<H>  10-16    PT/INR - ( 16 Oct 2023 20:47 )   PT: 15.0 sec;   INR: 1.33          PTT - ( 16 Oct 2023 20:47 )  PTT:31.2 sec  Urinalysis Basic - ( 16 Oct 2023 20:41 )    Color: Yellow / Appearance: Clear / SG: <=1.005 / pH: x  Gluc: x / Ketone: NEGATIVE  / Bili: Small / Urobili: 1.0 E.U./dL   Blood: x / Protein: Trace mg/dL / Nitrite: NEGATIVE   Leuk Esterase: Trace / RBC: < 5 /HPF / WBC 5-10 /HPF   Sq Epi: x / Non Sq Epi: x / Bacteria: Present /HPF      HCG Quantitative, Serum: 0 mIU/mL (10-16 @ 17:02)      RADIOLOGY & ADDITIONAL STUDIES:    ACC: [id number] EXAM:  CT ABDOMEN AND PELVIS OC IC   ORDERED BY: MIGUEL ANGEL SCHREIBER     PROCEDURE DATE:  10/16/2023          INTERPRETATION:  CLINICAL INFORMATION: Diffuse abdominal pain and   distention with hypotension    COMPARISON: None.    CONTRAST/COMPLICATIONS:  IV Contrast: Isovue 370  90 cc administered   10 cc discarded  Oral Contrast: Omnipaque 350  Complications: None reported at time of study completion    PROCEDURE:  CT of the Abdomen and Pelvis was performed.  Sagittal and coronal reformats were performed.    FINDINGS:  LOWER CHEST: Partially visualized bilateral breast implants. Mild   dependent density at the lung bases.    LIVER: Hepatomegaly  BILE DUCTS: Normal caliber.  GALLBLADDER: Within normal limits.  SPLEEN: Withinnormal limits.  PANCREAS: Within normal limits.  ADRENALS: Within normal limits.  KIDNEYS/URETERS: Within normal limits.    BLADDER: Within normal limits.  REPRODUCTIVE ORGANS: IUD in the uterus. No adnexal mass    BOWEL: No bowel obstruction. Appendix is not seen and may be surgically   absent. There is mild small bowel prominence with wall thickening in the   mid small bowel suggestive of enteritis. Fluid in the colon may be seen   with diarrhea  PERITONEUM: No ascites.  VESSELS: Within normal limits.  RETROPERITONEUM/LYMPH NODES: No lymphadenopathy.  ABDOMINAL WALL: Within normal limits.  BONES: Postoperative changes in the lower lumbar spine with posterior   fusion hardware at L4 and L5    IMPRESSION: Findings are suggestive of enteritis    --- End of Report ---            AGGE ODONNELL MD; Attending Radiologist  This document has been electronically signed. Oct 16 2023  7:28PM

## 2023-10-16 NOTE — CONSULT NOTE ADULT - SUBJECTIVE AND OBJECTIVE BOX
SICU CONSULT NOTE  Attending: Dr. Miller    HPI:        MEDICATIONS  (STANDING):  acetaminophen   IVPB .. 1000 milliGRAM(s) IV Intermittent once  chlorhexidine 4% Liquid 1 Application(s) Topical <User Schedule>  enoxaparin Injectable 40 milliGRAM(s) SubCutaneous every 24 hours  folic acid Injectable 1 milliGRAM(s) IV Push daily  insulin lispro (ADMELOG) corrective regimen sliding scale   SubCutaneous every 6 hours  pantoprazole  Injectable 40 milliGRAM(s) IV Push daily  piperacillin/tazobactam IVPB.. 3.375 Gram(s) IV Intermittent every 8 hours  thiamine Injectable 100 milliGRAM(s) IV Push daily    MEDICATIONS  (PRN):  LORazepam   Injectable 0.5 milliGRAM(s) IV Push every 4 hours PRN Anxiety          ICU Vital Signs Last 24 Hrs  T(C): 36.7 (16 Oct 2023 22:44), Max: 37.9 (16 Oct 2023 17:12)  T(F): 98.1 (16 Oct 2023 22:44), Max: 100.3 (16 Oct 2023 17:12)  HR: 112 (16 Oct 2023 23:00) (112 - 128)  BP: 108/64 (16 Oct 2023 23:00) (81/50 - 113/70)  BP(mean): 81 (16 Oct 2023 23:00) (65 - 85)  RR: 30 (16 Oct 2023 23:00) (18 - 30)  SpO2: 95% (16 Oct 2023 23:00) (95% - 99%)    O2 Parameters below as of 16 Oct 2023 20:47  Patient On (Oxygen Delivery Method): room air            Physical Exam:  General: NAD  HEENT: NC/AT, EOMI, PERRLA, normal hearing, no oral lesions, neck supple w/o LAD  Pulmonary: Nonlabored breathing, no respiratory distress, CTA-B  Cardiovascular: NSR, no murmurs  Abdominal: soft, NT/ND, +BS, no organomegaly  Extremities: WWP, 5/5 strength x 4, no clubbing/cyanosis/edema  Neuro: A/O x3, CNs II-XII grossly intact, normal motor/sensation, no focal deficits  Pulses: palpable distal pulses    I&O's Summary    16 Oct 2023 07:01  -  17 Oct 2023 00:27  --------------------------------------------------------  IN: 1000 mL / OUT: 0 mL / NET: 1000 mL        LABS:                        12.6   13.71 )-----------( 265      ( 16 Oct 2023 17:02 )             38.6     10-16    142  |  102  |  13  ----------------------------<  118<H>  3.5   |  19<L>  |  1.01    Ca    9.4      16 Oct 2023 17:02    TPro  7.2  /  Alb  3.9  /  TBili  2.5<H>  /  DBili  0.9<H>  /  AST  90<H>  /  ALT  58<H>  /  AlkPhos  135<H>  10-16    PT/INR - ( 16 Oct 2023 20:47 )   PT: 15.0 sec;   INR: 1.33          PTT - ( 16 Oct 2023 20:47 )  PTT:31.2 sec  Urinalysis Basic - ( 16 Oct 2023 20:41 )    Color: Yellow / Appearance: Clear / SG: <=1.005 / pH: x  Gluc: x / Ketone: NEGATIVE  / Bili: Small / Urobili: 1.0 E.U./dL   Blood: x / Protein: Trace mg/dL / Nitrite: NEGATIVE   Leuk Esterase: Trace / RBC: < 5 /HPF / WBC 5-10 /HPF   Sq Epi: x / Non Sq Epi: x / Bacteria: Present /HPF      CAPILLARY BLOOD GLUCOSE      POCT Blood Glucose.: 114 mg/dL (17 Oct 2023 00:05)    LIVER FUNCTIONS - ( 16 Oct 2023 17:02 )  Alb: 3.9 g/dL / Pro: 7.2 g/dL / ALK PHOS: 135 U/L / ALT: 58 U/L / AST: 90 U/L / GGT: x             Cultures:    RADIOLOGY & ADDITIONAL STUDIES:    NTERPRETATION:  CLINICAL INFORMATION: Diffuse abdominal pain and   distention with hypotension    COMPARISON: None.    CONTRAST/COMPLICATIONS:  IV Contrast: Isovue 370  90 cc administered   10 cc discarded  Oral Contrast: Omnipaque 350  Complications: None reported at time of study completion    PROCEDURE:  CT of the Abdomen and Pelvis was performed.  Sagittal and coronal reformats were performed.    FINDINGS:  LOWER CHEST: Partially visualized bilateral breast implants. Mild   dependent density at the lung bases.    LIVER: Hepatomegaly  BILE DUCTS: Normal caliber.  GALLBLADDER: Within normal limits.  SPLEEN: Withinnormal limits.  PANCREAS: Within normal limits.  ADRENALS: Within normal limits.  KIDNEYS/URETERS: Within normal limits.    BLADDER: Within normal limits.  REPRODUCTIVE ORGANS: IUD in the uterus. No adnexal mass    BOWEL: No bowel obstruction. Appendix is not seen and may be surgically   absent. There is mild small bowel prominence with wall thickening in the   mid small bowel suggestive of enteritis. Fluid in the colon may be seen   with diarrhea  PERITONEUM: No ascites.  VESSELS: Within normal limits.  RETROPERITONEUM/LYMPH NODES: No lymphadenopathy.  ABDOMINAL WALL: Within normal limits.  BONES: Postoperative changes in the lower lumbar spine with posterior   fusion hardware at L4 and L5    IMPRESSION: Findings are suggestive of enteritis    --- End of Report ---          BEDSIDE POCUS:   SICU CONSULT NOTE  Attending: Dr. Miller    HPI:    48 year old female with a history of HTN/HLD and anxiety, with PSHx of remote appendectomy (2009), who presented to [facility] for acute-onset abdominal pain since the early morning.  Patient admits to having intercourse the night prior. She thought she may have dislodged her IUD after intercourse so went to her GYN Dr. Estrada, who sent her to the ED. States the abdominal pain is diffuse, throbbing, and associated with chills/sweats. She denies nausea, vomiting, diarrhea, chest pain, and shortness of breath. In the ED, labs notable for lactate 7, leukocytosis 13.71, direct bili .9, indirect bili 1.6. CT A/P notable for enteritis. Patient admitted to the SICU for sepsis of unclear source, likely in the setting of intraabdominal source.     MEDICATIONS  (STANDING):  acetaminophen   IVPB .. 1000 milliGRAM(s) IV Intermittent once  chlorhexidine 4% Liquid 1 Application(s) Topical <User Schedule>  enoxaparin Injectable 40 milliGRAM(s) SubCutaneous every 24 hours  folic acid Injectable 1 milliGRAM(s) IV Push daily  insulin lispro (ADMELOG) corrective regimen sliding scale   SubCutaneous every 6 hours  pantoprazole  Injectable 40 milliGRAM(s) IV Push daily  piperacillin/tazobactam IVPB.. 3.375 Gram(s) IV Intermittent every 8 hours  thiamine Injectable 100 milliGRAM(s) IV Push daily    MEDICATIONS  (PRN):  LORazepam   Injectable 0.5 milliGRAM(s) IV Push every 4 hours PRN Anxiety      ICU Vital Signs Last 24 Hrs  T(C): 36.7 (16 Oct 2023 22:44), Max: 37.9 (16 Oct 2023 17:12)  T(F): 98.1 (16 Oct 2023 22:44), Max: 100.3 (16 Oct 2023 17:12)  HR: 112 (16 Oct 2023 23:00) (112 - 128)  BP: 108/64 (16 Oct 2023 23:00) (81/50 - 113/70)  BP(mean): 81 (16 Oct 2023 23:00) (65 - 85)  RR: 30 (16 Oct 2023 23:00) (18 - 30)  SpO2: 95% (16 Oct 2023 23:00) (95% - 99%)    O2 Parameters below as of 16 Oct 2023 20:47  Patient On (Oxygen Delivery Method): room air      Physical Exam:  General: No acute distress   HEENT: NC/AT, neck supple  Pulmonary: Nonlabored breathing, no respiratory distress, CTA bilaterally  Cardiovascular: NSR, no murmurs, rubs, gallops  Abdominal: Soft but distended, diffusely tender throughout, +BS   Extremities: WWP, no edema  Neuro: A/O x3    I&O's Summary    16 Oct 2023 07:01  -  17 Oct 2023 00:27  --------------------------------------------------------  IN: 1000 mL / OUT: 0 mL / NET: 1000 mL        LABS:                        12.6   13.71 )-----------( 265      ( 16 Oct 2023 17:02 )             38.6     10-16    142  |  102  |  13  ----------------------------<  118<H>  3.5   |  19<L>  |  1.01    Ca    9.4      16 Oct 2023 17:02    TPro  7.2  /  Alb  3.9  /  TBili  2.5<H>  /  DBili  0.9<H>  /  AST  90<H>  /  ALT  58<H>  /  AlkPhos  135<H>  10-16    PT/INR - ( 16 Oct 2023 20:47 )   PT: 15.0 sec;   INR: 1.33          PTT - ( 16 Oct 2023 20:47 )  PTT:31.2 sec  Urinalysis Basic - ( 16 Oct 2023 20:41 )    Color: Yellow / Appearance: Clear / SG: <=1.005 / pH: x  Gluc: x / Ketone: NEGATIVE  / Bili: Small / Urobili: 1.0 E.U./dL   Blood: x / Protein: Trace mg/dL / Nitrite: NEGATIVE   Leuk Esterase: Trace / RBC: < 5 /HPF / WBC 5-10 /HPF   Sq Epi: x / Non Sq Epi: x / Bacteria: Present /HPF      CAPILLARY BLOOD GLUCOSE      POCT Blood Glucose.: 114 mg/dL (17 Oct 2023 00:05)    LIVER FUNCTIONS - ( 16 Oct 2023 17:02 )  Alb: 3.9 g/dL / Pro: 7.2 g/dL / ALK PHOS: 135 U/L / ALT: 58 U/L / AST: 90 U/L / GGT: x             Cultures:    RADIOLOGY & ADDITIONAL STUDIES:    NTERPRETATION:  CLINICAL INFORMATION: Diffuse abdominal pain and   distention with hypotension    COMPARISON: None.    CONTRAST/COMPLICATIONS:  IV Contrast: Isovue 370  90 cc administered   10 cc discarded  Oral Contrast: Omnipaque 350  Complications: None reported at time of study completion    PROCEDURE:  CT of the Abdomen and Pelvis was performed.  Sagittal and coronal reformats were performed.    FINDINGS:  LOWER CHEST: Partially visualized bilateral breast implants. Mild   dependent density at the lung bases.    LIVER: Hepatomegaly  BILE DUCTS: Normal caliber.  GALLBLADDER: Within normal limits.  SPLEEN: Withinnormal limits.  PANCREAS: Within normal limits.  ADRENALS: Within normal limits.  KIDNEYS/URETERS: Within normal limits.    BLADDER: Within normal limits.  REPRODUCTIVE ORGANS: IUD in the uterus. No adnexal mass    BOWEL: No bowel obstruction. Appendix is not seen and may be surgically   absent. There is mild small bowel prominence with wall thickening in the   mid small bowel suggestive of enteritis. Fluid in the colon may be seen   with diarrhea  PERITONEUM: No ascites.  VESSELS: Within normal limits.  RETROPERITONEUM/LYMPH NODES: No lymphadenopathy.  ABDOMINAL WALL: Within normal limits.  BONES: Postoperative changes in the lower lumbar spine with posterior   fusion hardware at L4 and L5    IMPRESSION: Findings are suggestive of enteritis    --- End of Report ---    BEDSIDE POCUS:  B Lines noted on R lung in apex and base   Heart with noted normal squ SICU CONSULT NOTE  Attending: Dr. Miller    HPI:    48 year old female with a history of HTN/HLD and anxiety, with PSHx of remote appendectomy (2009), who presented to [facility] for acute-onset abdominal pain since the early morning.  Patient admits to having intercourse the night prior. She thought she may have dislodged her IUD after intercourse so went to her GYN Dr. Estrada, who sent her to the ED. States the abdominal pain is diffuse, throbbing, and associated with chills/sweats. She denies nausea, vomiting, diarrhea, chest pain, and shortness of breath. In the ED, labs notable for lactate 7, leukocytosis 13.71, direct bili .9, indirect bili 1.6. CT A/P notable for enteritis. Patient admitted to the SICU for sepsis of unclear source, likely in the setting of intraabdominal source.     MEDICATIONS  (STANDING):  acetaminophen   IVPB .. 1000 milliGRAM(s) IV Intermittent once  chlorhexidine 4% Liquid 1 Application(s) Topical <User Schedule>  enoxaparin Injectable 40 milliGRAM(s) SubCutaneous every 24 hours  folic acid Injectable 1 milliGRAM(s) IV Push daily  insulin lispro (ADMELOG) corrective regimen sliding scale   SubCutaneous every 6 hours  pantoprazole  Injectable 40 milliGRAM(s) IV Push daily  piperacillin/tazobactam IVPB.. 3.375 Gram(s) IV Intermittent every 8 hours  thiamine Injectable 100 milliGRAM(s) IV Push daily    MEDICATIONS  (PRN):  LORazepam   Injectable 0.5 milliGRAM(s) IV Push every 4 hours PRN Anxiety      ICU Vital Signs Last 24 Hrs  T(C): 36.7 (16 Oct 2023 22:44), Max: 37.9 (16 Oct 2023 17:12)  T(F): 98.1 (16 Oct 2023 22:44), Max: 100.3 (16 Oct 2023 17:12)  HR: 112 (16 Oct 2023 23:00) (112 - 128)  BP: 108/64 (16 Oct 2023 23:00) (81/50 - 113/70)  BP(mean): 81 (16 Oct 2023 23:00) (65 - 85)  RR: 30 (16 Oct 2023 23:00) (18 - 30)  SpO2: 95% (16 Oct 2023 23:00) (95% - 99%)    O2 Parameters below as of 16 Oct 2023 20:47  Patient On (Oxygen Delivery Method): room air      Physical Exam:  General: Resting in bed, no acute distress  Pulmonary: Nonlabored breathing, no respiratory distress  Cardiovascular: NSR  Abdominal: Soft but distended, diffusely tender throughout, +BS   Extremities: WWP, no edema noted  Neuro: A/O x3, no focal deficits    I&O's Summary    16 Oct 2023 07:01  -  17 Oct 2023 00:27  --------------------------------------------------------  IN: 1000 mL / OUT: 0 mL / NET: 1000 mL        LABS:                        12.6   13.71 )-----------( 265      ( 16 Oct 2023 17:02 )             38.6     10-16    142  |  102  |  13  ----------------------------<  118<H>  3.5   |  19<L>  |  1.01    Ca    9.4      16 Oct 2023 17:02    TPro  7.2  /  Alb  3.9  /  TBili  2.5<H>  /  DBili  0.9<H>  /  AST  90<H>  /  ALT  58<H>  /  AlkPhos  135<H>  10-16    PT/INR - ( 16 Oct 2023 20:47 )   PT: 15.0 sec;   INR: 1.33          PTT - ( 16 Oct 2023 20:47 )  PTT:31.2 sec  Urinalysis Basic - ( 16 Oct 2023 20:41 )    Color: Yellow / Appearance: Clear / SG: <=1.005 / pH: x  Gluc: x / Ketone: NEGATIVE  / Bili: Small / Urobili: 1.0 E.U./dL   Blood: x / Protein: Trace mg/dL / Nitrite: NEGATIVE   Leuk Esterase: Trace / RBC: < 5 /HPF / WBC 5-10 /HPF   Sq Epi: x / Non Sq Epi: x / Bacteria: Present /HPF      CAPILLARY BLOOD GLUCOSE      POCT Blood Glucose.: 114 mg/dL (17 Oct 2023 00:05)    LIVER FUNCTIONS - ( 16 Oct 2023 17:02 )  Alb: 3.9 g/dL / Pro: 7.2 g/dL / ALK PHOS: 135 U/L / ALT: 58 U/L / AST: 90 U/L / GGT: x             Cultures:    RADIOLOGY & ADDITIONAL STUDIES:    NTERPRETATION:  CLINICAL INFORMATION: Diffuse abdominal pain and   distention with hypotension    COMPARISON: None.    CONTRAST/COMPLICATIONS:  IV Contrast: Isovue 370  90 cc administered   10 cc discarded  Oral Contrast: Omnipaque 350  Complications: None reported at time of study completion    PROCEDURE:  CT of the Abdomen and Pelvis was performed.  Sagittal and coronal reformats were performed.    FINDINGS:  LOWER CHEST: Partially visualized bilateral breast implants. Mild   dependent density at the lung bases.    LIVER: Hepatomegaly  BILE DUCTS: Normal caliber.  GALLBLADDER: Within normal limits.  SPLEEN: Withinnormal limits.  PANCREAS: Within normal limits.  ADRENALS: Within normal limits.  KIDNEYS/URETERS: Within normal limits.    BLADDER: Within normal limits.  REPRODUCTIVE ORGANS: IUD in the uterus. No adnexal mass    BOWEL: No bowel obstruction. Appendix is not seen and may be surgically   absent. There is mild small bowel prominence with wall thickening in the   mid small bowel suggestive of enteritis. Fluid in the colon may be seen   with diarrhea  PERITONEUM: No ascites.  VESSELS: Within normal limits.  RETROPERITONEUM/LYMPH NODES: No lymphadenopathy.  ABDOMINAL WALL: Within normal limits.  BONES: Postoperative changes in the lower lumbar spine with posterior   fusion hardware at L4 and L5    IMPRESSION: Findings are suggestive of enteritis    --- End of Report ---    BEDSIDE POCUS:  Lungs:  Right lung: B Lines noted, +pleural sliding, no pleural effusion noted. Left lung: +pleural sliding, no pleural effusion noted.    Heart: R Ventricle did not appear enlarged, IVC without respiratory variation SICU CONSULT NOTE  Attending: Dr. Miller    HPI:    48 year old female with a history of HTN/HLD and anxiety, with PSHx of remote appendectomy (2009), who presented to [facility] for acute-onset abdominal pain since the early morning.  Patient admits to having intercourse the night prior. She thought she may have dislodged her IUD after intercourse so went to her GYN, Dr. Estrada, who sent her to the ED. States the abdominal pain is diffuse, throbbing, and associated with chills/sweats. In the ED, labs notable for lactate 7, leukocytosis 13.71, direct bili .9, indirect bili 1.6. CT A/P notable for enteritis. Patient denies nausea, vomiting, diarrhea, chest pain, and shortness of breath. Patient admitted to the SICU for sepsis of unclear source, likely in the setting of intraabdominal source.     MEDICATIONS  (STANDING):  acetaminophen   IVPB .. 1000 milliGRAM(s) IV Intermittent once  chlorhexidine 4% Liquid 1 Application(s) Topical <User Schedule>  enoxaparin Injectable 40 milliGRAM(s) SubCutaneous every 24 hours  folic acid Injectable 1 milliGRAM(s) IV Push daily  insulin lispro (ADMELOG) corrective regimen sliding scale   SubCutaneous every 6 hours  pantoprazole  Injectable 40 milliGRAM(s) IV Push daily  piperacillin/tazobactam IVPB.. 3.375 Gram(s) IV Intermittent every 8 hours  thiamine Injectable 100 milliGRAM(s) IV Push daily    MEDICATIONS  (PRN):  LORazepam   Injectable 0.5 milliGRAM(s) IV Push every 4 hours PRN Anxiety      ICU Vital Signs Last 24 Hrs  T(C): 36.7 (16 Oct 2023 22:44), Max: 37.9 (16 Oct 2023 17:12)  T(F): 98.1 (16 Oct 2023 22:44), Max: 100.3 (16 Oct 2023 17:12)  HR: 112 (16 Oct 2023 23:00) (112 - 128)  BP: 108/64 (16 Oct 2023 23:00) (81/50 - 113/70)  BP(mean): 81 (16 Oct 2023 23:00) (65 - 85)  RR: 30 (16 Oct 2023 23:00) (18 - 30)  SpO2: 95% (16 Oct 2023 23:00) (95% - 99%)    O2 Parameters below as of 16 Oct 2023 20:47  Patient On (Oxygen Delivery Method): room air      Physical Exam:  General: Resting in bed, no acute distress  Pulmonary: Nonlabored breathing, no respiratory distress  Cardiovascular: NSR  Abdominal: Soft but distended, diffusely tender throughout, +BS   Extremities: WWP, no edema noted  Neuro: A/O x3, no focal deficits    I&O's Summary    16 Oct 2023 07:01  -  17 Oct 2023 00:27  --------------------------------------------------------  IN: 1000 mL / OUT: 0 mL / NET: 1000 mL        LABS:                        12.6   13.71 )-----------( 265      ( 16 Oct 2023 17:02 )             38.6     10-16    142  |  102  |  13  ----------------------------<  118<H>  3.5   |  19<L>  |  1.01    Ca    9.4      16 Oct 2023 17:02    TPro  7.2  /  Alb  3.9  /  TBili  2.5<H>  /  DBili  0.9<H>  /  AST  90<H>  /  ALT  58<H>  /  AlkPhos  135<H>  10-16    PT/INR - ( 16 Oct 2023 20:47 )   PT: 15.0 sec;   INR: 1.33          PTT - ( 16 Oct 2023 20:47 )  PTT:31.2 sec  Urinalysis Basic - ( 16 Oct 2023 20:41 )    Color: Yellow / Appearance: Clear / SG: <=1.005 / pH: x  Gluc: x / Ketone: NEGATIVE  / Bili: Small / Urobili: 1.0 E.U./dL   Blood: x / Protein: Trace mg/dL / Nitrite: NEGATIVE   Leuk Esterase: Trace / RBC: < 5 /HPF / WBC 5-10 /HPF   Sq Epi: x / Non Sq Epi: x / Bacteria: Present /HPF      CAPILLARY BLOOD GLUCOSE      POCT Blood Glucose.: 114 mg/dL (17 Oct 2023 00:05)    LIVER FUNCTIONS - ( 16 Oct 2023 17:02 )  Alb: 3.9 g/dL / Pro: 7.2 g/dL / ALK PHOS: 135 U/L / ALT: 58 U/L / AST: 90 U/L / GGT: x             Cultures:    RADIOLOGY & ADDITIONAL STUDIES:    NTERPRETATION:  CLINICAL INFORMATION: Diffuse abdominal pain and   distention with hypotension    COMPARISON: None.    CONTRAST/COMPLICATIONS:  IV Contrast: Isovue 370  90 cc administered   10 cc discarded  Oral Contrast: Omnipaque 350  Complications: None reported at time of study completion    PROCEDURE:  CT of the Abdomen and Pelvis was performed.  Sagittal and coronal reformats were performed.    FINDINGS:  LOWER CHEST: Partially visualized bilateral breast implants. Mild   dependent density at the lung bases.    LIVER: Hepatomegaly  BILE DUCTS: Normal caliber.  GALLBLADDER: Within normal limits.  SPLEEN: Withinnormal limits.  PANCREAS: Within normal limits.  ADRENALS: Within normal limits.  KIDNEYS/URETERS: Within normal limits.    BLADDER: Within normal limits.  REPRODUCTIVE ORGANS: IUD in the uterus. No adnexal mass    BOWEL: No bowel obstruction. Appendix is not seen and may be surgically   absent. There is mild small bowel prominence with wall thickening in the   mid small bowel suggestive of enteritis. Fluid in the colon may be seen   with diarrhea  PERITONEUM: No ascites.  VESSELS: Within normal limits.  RETROPERITONEUM/LYMPH NODES: No lymphadenopathy.  ABDOMINAL WALL: Within normal limits.  BONES: Postoperative changes in the lower lumbar spine with posterior   fusion hardware at L4 and L5    IMPRESSION: Findings are suggestive of enteritis    --- End of Report ---    BEDSIDE POCUS:  Lungs:  Right lung: B Lines noted, +pleural sliding, no pleural effusion noted. Left lung: +pleural sliding, no pleural effusion noted.    Heart: R Ventricle did not appear enlarged, IVC without respiratory variation, good cardiac contractility noted

## 2023-10-16 NOTE — ED PROVIDER NOTE - OBJECTIVE STATEMENT
Pt w/ PMHx HTN, gout, colon polyp for planned resection 10//31, , has IUD (does not get menses), is sexually active but uses condoms, p/w abd pain, onset awakening her from sleep last night. The pain is midline infraumbilical to generalized, constant, waxing and waning. No n/v/d/c. Last BM yesterday. + anorexia. No f/c. No F/U/D or hematuria. No vag bleeding or discharge. She saw GYN Dr Lomas today, whom ? confirmed IUD placement in office US, and referred pt to the ED. Pt w/ PMHx HTN, gout, colon polyp for planned resection 10//31, , has IUD (does not get menses), is sexually active but uses condoms, PShx appendectomy, p/w abd pain, onset awakening her from sleep last night. The pain is midline infraumbilical to generalized, constant, waxing and waning. No n/v/d/c. Last BM yesterday, normal to watery. + anorexia. No f/c. No F/U/D or hematuria. Reports dec urination. No vag bleeding or discharge. She saw GYN Dr Lomas today, whom ? confirmed IUD placement in office US, and referred pt to the ED.  Drinks alcohol daily, denies hx w/d sx

## 2023-10-16 NOTE — H&P ADULT - NSHPPHYSICALEXAM_GEN_ALL_CORE
Constitutional: Appears in acute distress   HEENT: Normal EOM, PERRLA, normal mucus membranes  Cardiac: Normal rate, regular rhythm.  Heart sounds S1, S2.  No murmurs, rubs or gallops.  Respiratory: Normal equal BS, appropriate O2 sat in RA.   Gastrointestinal: Abdomen soft but moderately distended and diffusely tender with voluntary guarding. Knees flexed   Musculoskeletal: Range of motion is not limited  Neuro: Alert and oriented x 3, no focal deficits noted.  Psych: Normal mood and affect. no apparent risk to self or others.

## 2023-10-16 NOTE — CONSULT NOTE ADULT - ATTENDING COMMENTS
Carlos Gabriel   SICU  Consultation  Ms. Gabriel is a 47 y/o F with anxiety, alcohol user, recreational drug use presented with lower abdominal pain, associate with chills.  /min, BP 88/40 mmHg, she was given 3L IVF, WBC elevated from baseline, lactate 7, Tbil elevated, transaminase elevated.  POCUS done:  Lung - good pleural sliding. presence of b-lines on the R in all lung zones, absence of b-lines on the L, absence of spine sign  Cardiac: no sig pericardial effusion, good contractility  IVC: diameter above 2cm, no variation with respiration  -severe lower abdominal pain  -elevated Tbil  -elevated transaminitis  -likely sepsis with lower abdomen source  -amphetamine use  -alcohol use  >Tylenol for pain control  >Zofran for nausea  >thiamine and folate  >would likely need GI f/u as outpatient for hepatomegaly, elevated bili and transaminase; f/u LFT in the am  >IVF given with appropriate volume for sepsis  >NPO  >zosyn empirically to de escalate depending on results  >DVT prophylaxis

## 2023-10-16 NOTE — H&P ADULT - HISTORY OF PRESENT ILLNESS
Patient is a 48F with PMHx of HTN, HLD and anxiety and PSHx of remote appendectomy who presents to [facility] for acute-onset abdominal pain this AM which woke her up. States she had intercourse last evening and that this morning she was awoken by sharp, throbbing lower abdominal pain. States she had never had pain like this prior and that pain was associated with chills/sweats, but denied nausea, emesis, sob, palpitations or syncope. Reports pain was also associated with significant abdominal distention. States pain was not relieved by ibuprofen and was progressively worsening. She thought she had potentially dislodged her IUD after intercourse so she initially presented to her GYN, Dr. Estrada. She was evalauted there and sent into the ED for further evaluation given degree of pain and abdominal distention    Patient states she recently had a colonocopy earlier this year. A polyp was found and she was referred to another gastroenterologist for polypectomy which is coming up.    Patient is a 48F with PMHx of HTN, HLD and anxiety and PSHx of remote appendectomy who presents to [facility] for acute-onset abdominal pain this AM which woke her up. States she had intercourse last evening and that this morning she was awoken by sharp, throbbing lower abdominal pain. States she had never had pain like this prior and that pain was associated with chills/sweats, but denied nausea, emesis, sob, palpitations or syncope. Reports pain was also associated with significant abdominal distention. States pain was not relieved by ibuprofen and was progressively worsening. She thought she had potentially dislodged her IUD after intercourse so she initially presented to her GYN, Dr. Estrada. She was evaluated there and sent into the ED for further evaluation given degree of pain and abdominal distention    Patient states she recently had a colonoscopy earlier this year. A polyp was found and she was referred to another gastroenterologist for polypectomy which is coming up.     Medical History: HTN, HLD, anxiety  Surgical History: Appendectomy, Breast implants  Medications: Lisinopril 10, Seroquel 25 qd, Atorvastatin 20 qd, Vivanes qd  Allergies: NKDA  Social History: Patient states she smokes and tobacco occasionally (1-2 times per week). States she drinks 2 alcoholic beverages >5 times per week. Denies any other drug use including amphetamines.    In the ED, patient tachycardic and hypotensive requiring fluid resuscitation:  - VITALS: Afebrile T 98.5 F, , BP  81/59 --> 113/70 s/p 3 L IVF resuscitation. Saturating well on RA  - LABORATORY: WBC 14K with L shift and bandemia, Hb 12.6, Cr 1.01, lactate 7.0. Transaminitis with T bili 2.5 - indirect 1.6. UA negative. UTox + amphetamine, opiates, THC  - IMAGING: CTAP in the ED without obvious pathology other than enteritis of the small bowel noted.

## 2023-10-16 NOTE — ED ADULT TRIAGE NOTE - CHIEF COMPLAINT QUOTE
pt c/o abdominal pain and distension for the past day. denies n/v/diarrhea. sent to ER by Dr. Raquel Sommers

## 2023-10-16 NOTE — ED ADULT NURSE NOTE - OBJECTIVE STATEMENT
Pt. a&ox4 ambulatory with steady gait, hx of HTN, HLD, gout, and recent UTI finished course of abx, has IUD, dxed colon polyp with planned removal on to be on 10/31, comes to ED c/o sudden onset severe abdominal pain, worse on the right side, with abdominal guarding, distention, and rigidity, waking her out of her sleep early this am. Pt. made UG to [facility], placed on ccm, pulseox and BP. Pt. denies excessive NSAID use, trauma, n/v/d, f/c, HA, dizziness, cp, SOB. Please see flowsheets for VS trends and interventions.

## 2023-10-16 NOTE — H&P ADULT - NSHPLABSRESULTS_GEN_ALL_CORE
12.6   13.71 )-----------( 265      ( 16 Oct 2023 17:02 )             38.6       10-16    142  |  102  |  13  ----------------------------<  118<H>  3.5   |  19<L>  |  1.01    Ca    9.4      16 Oct 2023 17:02    TPro  7.2  /  Alb  3.9  /  TBili  2.5<H>  /  DBili  0.9<H>  /  AST  90<H>  /  ALT  58<H>  /  AlkPhos  135<H>  10-16      CT ABDOMEN-PELVIS   BOWEL: No bowel obstruction. Appendix is not seen and may be surgically absent. There is mild small bowel prominence with wall thickening in the mid small bowel suggestive of enteritis. Fluid in the colon may be seen with diarrhea  PERITONEUM: No ascites.  VESSELS: Within normal limits.  RETROPERITONEUM/LYMPH NODES: No lymphadenopathy.  ABDOMINAL WALL: Within normal limits.  BONES: Postoperative changes in the lower lumbar spine with posterior fusion hardware at L4 and L5   LIVER: Hepatomegaly

## 2023-10-16 NOTE — ED PROVIDER NOTE - PHYSICAL EXAMINATION
Constitutional: Well appearing, awake, alert, oriented to person, place, time/situation and appears uncomfortable  ENMT: Airway patent. Normal MM  Eyes: Clear bilaterally  Cardiac: Normal rate, regular rhythm.  Heart sounds S1, S2.  No murmurs, rubs or gallops.  Respiratory: Breaths sounds equal and clear b/l. No increased WOB, tachypnea, hypoxia, or accessory mm use. Pt speaks in full sentences.   Gastrointestinal: Abd soft, + diffuse abd ttp, mild distention,  hypoactive BS. No guarding, rebound, or rigidity. No pulsatile abdominal masses. No organomegaly appreciated. POCUS FAST neg. No sig urinary retention  Musculoskeletal: Range of motion is not limited  Neuro: Alert and oriented x 3, face symmetric and speech fluent. Strength 5/5 x 4 ext and symmetric, nml gross motor movement, nml gait. No focal deficits noted.  Skin: Skin normal color for race, warm, dry and intact. No evidence of rash.  Psych: Alert and oriented to person, place, time/situation. normal mood and affect. no apparent risk to self or others. Constitutional: Well appearing, awake, alert, oriented to person, place, time/situation and appears uncomfortable  ENMT: Airway patent. Normal MM  Eyes: Clear bilaterally  Cardiac: Normal rate, regular rhythm.  Heart sounds S1, S2.  No murmurs, rubs or gallops.  Respiratory: Breaths sounds equal and clear b/l. No increased WOB, tachypnea, hypoxia, or accessory mm use. Pt speaks in full sentences.   Gastrointestinal: Abd soft, + diffuse abd ttp, mild distention,  hypoactive BS. + guarding. No rebound, or rigidity. No pulsatile abdominal masses. No organomegaly appreciated. POCUS FAST neg. No sig urinary retention  Musculoskeletal: Range of motion is not limited  Neuro: Alert and oriented x 3, face symmetric and speech fluent. Strength 5/5 x 4 ext and symmetric, nml gross motor movement, nml gait. No focal deficits noted.  Skin: Skin normal color for race, warm, dry and intact. No evidence of rash.  Psych: Alert and oriented to person, place, time/situation. normal mood and affect. no apparent risk to self or others.

## 2023-10-16 NOTE — ED ADULT NURSE NOTE - NSFALLUNIVINTERV_ED_ALL_ED
Bed/Stretcher in lowest position, wheels locked, appropriate side rails in place/Call bell, personal items and telephone in reach/Instruct patient to call for assistance before getting out of bed/chair/stretcher/Non-slip footwear applied when patient is off stretcher/[place] to call system/Physically safe environment - no spills, clutter or unnecessary equipment/Purposeful proactive rounding/Room/bathroom lighting operational, light cord in reach

## 2023-10-16 NOTE — ED PROVIDER NOTE - PROGRESS NOTE DETAILS
Worsening abd exam, acute abd, more localized to LLQ. surgery consulted MAP maintaining at 65 or above, therefore will continue fluid resuscitation. Will give pressors if MAP less than 65 PT seen and examined by surgery. Pt for admission to SICU, Dr Ramos, for serial exams Worsening abd exam, acute abd, rigid, more localized to LLQ. surgery consulted. stat CT. Pt now reports hx appendectomy

## 2023-10-16 NOTE — CONSULT NOTE ADULT - ASSESSMENT
Neuro: A&Ox3. Avoid narcotics if possible. Pain control with IV tylenol. Nausea control with Zofran PRN. Hx of anxiety - holding seroquel, vivanes  HEENT: No concerns  CV: Goal maintian MAP > 65. s/p 3 L IVF resuscitation. Not currently requiring pressors. Presumed septic shock 2/2 intraabdominal source. Hx of HTN - holding lisinopril. Hx of HLD - holding atrovatatin. Lactate 5.6 of unclear etiology - given additional 1 L of IVF and will re-check with cardiac labs  Pulm: Goal maintain saturation > 94%. Saturating well on RA.  GI: Enteritis on CT scan. Maintain NPO, IVF resuscitation, PPI.  : Voids  ID: Zosyn (10/16 - ) for persumed intraabdominal sepsis  Heme: SCDs, Lovenox qd  Wounds/Drains/Lines: PIVs  PT/OT: Deferred  Dispo: SICU 48 year old female with a history of HTN/HLD and anxiety, with PSHx of remote appendectomy (2009), who presented to [facility] for acute-onset abdominal pain since the early morning.  Patient admits to having intercourse the night prior. She thought she may have dislodged her IUD after intercourse so went to her GYN, Dr. Estrada, who sent her to the ED. States the abdominal pain is diffuse, throbbing, and associated with chills/sweats. In the ED, labs notable for lactate 7, leukocytosis 13.71, direct bili .9, indirect bili 1.6. CT A/P notable for enteritis. Patient admitted to the SICU for sepsis of unclear source, likely in the setting of intraabdominal source.     Neuro: A&Ox3. Avoid narcotics if possible. Pain control with IV Tylenol PRN. Nausea control with Zofran PRN. Hx of anxiety - Hold home Seroquel, Vivance, can give Ativan .5 mg PRN anxiety.    HEENT: No concerns  CV: Goal maintian MAP > 65. s/p 3 L IVF resuscitation. Not currently requiring pressors. Presumed sepsis 2/2 intraabdominal source. Hx of HTN - holding lisinopril. Hx of HLD - holding atorvastatin Lactate 5.6 of unclear etiology - given additional 1 L of IVF and will re-check with lactate and cardiac labs  Pulm: Goal maintain saturation > 94%. Saturating well on RA.  GI: Enteritis on CT scan. Maintain NPO, IVF resuscitation, PPI.  : Voiding  ID: Zosyn (10/16 - ) for presumed intraabdominal sepsis  Heme: SCDs, Lovenox QD  Wounds/Drains/Lines: PIVs  PT/OT: Deferred  Dispo: SICU

## 2023-10-16 NOTE — H&P ADULT - ASSESSMENT
49 yo F with PMHx of HTN, HLD and anxiety and PSHx of remote appendectomy who presents to [facility] for acute-onset abdominal pain this AM after "aggressive sexual intercourse" and alcohol intake last night. Abdomen soft but moderately distended and diffusely tender with voluntary guarding. Knees flexed. Patient tachycardic and hypotensive on arrival with a low grade temp of 100.3 F. Labs notable for mild leukocytosis with bands to 14.7, but no left shift. Lactic acidosis to 7 (down to 5.6 s/p 3L crystalloid). Total hyperbilirubinemia to 2.5 with mild transaminitis (AST > ALT 2:1) which patient reports is baseline. Urine tox positive for amphetamines but patient denies use. CT A/P suggestive of enteritis, otherwise no acute pathologic process. Sepsis likely of intraabdominal origin but unclear at this point. Differentials include enteritis, alcoholic hepatitis, pancreatitis (although not apparent on CT and lipase pending), drug-induced mesenteric vasospasm, PUD     Plan:   - Will admit to SICU for resuscitation and further work up   - NPO/IVF   - Serial abdominal exams q4   - IV Abx (Zosyn)   - Trend lactate   - Hepatitis panel   - Stool O & P, GI PCR, C-diff   - RUQ ultrasound   - Fractionate T-bili   - Follow up lipase   - F/u blood Cx   - Appreciate excellent SICU care     Seen and d/w SICU & chief resident. Attending aware and agrees with plan

## 2023-10-16 NOTE — CONSULT NOTE ADULT - ASSESSMENT
47 yo F with PMHx of HTN, HLD and anxiety and PSHx of remote appendectomy who presents to [facility] for acute-onset abdominal pain this AM, admitted to SICU for resuscitation and workup. In the ED, pt's abdomen was distended and tender with voluntary guarding. Patient was tachycardic and hypotensive on arrival with a low grade temp of 100.3 F. Labs notable for mild leukocytosis with bands to 14.7, but no left shift. Lactic acidosis to 7 (down to 5.6 s/p 3L crystalloid). Total hyperbilirubinemia to 2.5 with mild transaminitis (AST > ALT 2:1) which patient reports is baseline. Utox c/w Vivans use, opiods from pain medication in ED, and recreational marijuana use.   -Currently, VSS. +tachycardia. Hypotension resolved s/p 3L IVF.   -Hemodynamically stable,   -Episode of fecal incontinence  -No acute GYN intervention at this time  -Primary management per SICU  -GYN will continue to follow  49 yo F with PMHx of HTN, HLD and anxiety and PSHx of remote appendectomy who presents to [facility] for acute-onset abdominal pain this AM, admitted to SICU for resuscitation and workup. In the ED, pt's abdomen was distended and tender with voluntary guarding. Patient was tachycardic and hypotensive on arrival with a low grade temp of 100.3 F. Labs notable for mild leukocytosis with bands to 14.7, but no left shift. Lactic acidosis to 7 (down to 5.6 s/p 3L crystalloid). Total hyperbilirubinemia to 2.5 with mild transaminitis (AST > ALT 2:1) which patient reports is baseline. Utox c/w Vivans use, opiods from pain medication in ED, and recreational marijuana use. In SICU, pt made NPO/IVF with serial abdominal exams q4. Zosyn started. Will trend lactate, stool O+P, GI PCR, C-diff and f/u BCx. RUQ U/S ordered.  -Currently, VSS. +tachycardia. Hypotension resolved s/p 3L IVF.   -Hemodynamically stable.  -Episode of fecal incontinence, requires further evaluation.   -Recommend cortisol and TSH level. If acute intraabdominal pathology is ruled out, recommend endocrinology workup.   -No acute GYN intervention at this time  -Primary management per SICU  -GYN will continue to follow     Jaspal Pressley PGY2  D/w Dr. Lomas and Dr. Spaulding PGY4 47 yo F with PMHx of HTN, HLD and anxiety and PSHx of remote appendectomy who presents to [facility] for acute-onset abdominal pain this AM, admitted to SICU for resuscitation and workup. In the ED, pt's abdomen was distended and tender with voluntary guarding. Patient was tachycardic and hypotensive on arrival with a low grade temp of 100.3 F. Labs notable for mild leukocytosis with bands to 14.7, but no left shift. Lactic acidosis to 7 (down to 5.6 s/p 3L crystalloid). Total hyperbilirubinemia to 2.5 with mild transaminitis (AST > ALT 2:1) which patient reports is baseline. Utox c/w Vivans use, opiods from pain medication in ED, and recreational marijuana use. In SICU, pt made NPO/IVF with serial abdominal exams q4. Zosyn started. Will trend lactate, stool O+P, GI PCR, C-diff and f/u BCx. RUQ U/S ordered.  -Currently, VSS. +tachycardia. Hypotension resolved s/p 3L IVF.   -Hemodynamically stable.  -Episode of fecal incontinence, requires further evaluation.   -Recommend cortisol and TSH level. If acute intraabdominal pathology is ruled out, recommend endocrinology workup.   -No acute GYN intervention at this time. Pelvic exam wnl.   -Primary management per SICU  -GYN will continue to follow     Jaspal Pressley PGY2  D/w Dr. Lomas and Dr. Spaulding PGY4 47 yo F with PMHx of HTN, HLD and anxiety and PSHx of remote appendectomy who presents to [facility] for acute-onset abdominal pain this AM, admitted to SICU for resuscitation and workup. In the ED, pt's abdomen was distended and tender with voluntary guarding. Patient was tachycardic and hypotensive on arrival with a low grade temp of 100.3 F. Labs notable for mild leukocytosis with bands to 14.7, but no left shift. Lactic acidosis to 7 (down to 5.6 s/p 3L crystalloid). Total hyperbilirubinemia to 2.5 with mild transaminitis (AST > ALT 2:1) which patient reports is baseline. Utox c/w Vivans use, opiods from pain medication in ED, and recreational marijuana use. In SICU, pt made NPO/IVF with serial abdominal exams q4. Zosyn started. Will trend lactate, stool O+P, GI PCR, C-diff and f/u BCx. RUQ U/S ordered.  -Currently, VSS. +tachycardia. Hypotension resolved s/p 3L IVF.   -Hemodynamically stable.  -Episode of fecal incontinence, requires further evaluation.   -Recommend cortisol and TSH level. If acute intraabdominal pathology is ruled out, recommend endocrinology workup.   -No acute GYN intervention at this time. Pelvic exam wnl.   -Primary management per SICU  -Please reconsult GYN as needed    Jaspal Pressley PGY2  D/w Dr. Lomas and Dr. Spaulding PGY4

## 2023-10-17 LAB
A1C WITH ESTIMATED AVERAGE GLUCOSE RESULT: 5.9 % — HIGH (ref 4–5.6)
A1C WITH ESTIMATED AVERAGE GLUCOSE RESULT: 5.9 % — HIGH (ref 4–5.6)
ALBUMIN SERPL ELPH-MCNC: 3.3 G/DL — SIGNIFICANT CHANGE UP (ref 3.3–5)
ALBUMIN SERPL ELPH-MCNC: 3.3 G/DL — SIGNIFICANT CHANGE UP (ref 3.3–5)
ALP SERPL-CCNC: 98 U/L — SIGNIFICANT CHANGE UP (ref 40–120)
ALP SERPL-CCNC: 98 U/L — SIGNIFICANT CHANGE UP (ref 40–120)
ALT FLD-CCNC: 42 U/L — SIGNIFICANT CHANGE UP (ref 10–45)
ALT FLD-CCNC: 42 U/L — SIGNIFICANT CHANGE UP (ref 10–45)
ANION GAP SERPL CALC-SCNC: 14 MMOL/L — SIGNIFICANT CHANGE UP (ref 5–17)
ANION GAP SERPL CALC-SCNC: 14 MMOL/L — SIGNIFICANT CHANGE UP (ref 5–17)
ANION GAP SERPL CALC-SCNC: 17 MMOL/L — SIGNIFICANT CHANGE UP (ref 5–17)
ANION GAP SERPL CALC-SCNC: 17 MMOL/L — SIGNIFICANT CHANGE UP (ref 5–17)
AST SERPL-CCNC: 53 U/L — HIGH (ref 10–40)
AST SERPL-CCNC: 53 U/L — HIGH (ref 10–40)
BASOPHILS # BLD AUTO: 0 K/UL — SIGNIFICANT CHANGE UP (ref 0–0.2)
BASOPHILS # BLD AUTO: 0 K/UL — SIGNIFICANT CHANGE UP (ref 0–0.2)
BASOPHILS NFR BLD AUTO: 0 % — SIGNIFICANT CHANGE UP (ref 0–2)
BASOPHILS NFR BLD AUTO: 0 % — SIGNIFICANT CHANGE UP (ref 0–2)
BILIRUB SERPL-MCNC: 1.7 MG/DL — HIGH (ref 0.2–1.2)
BILIRUB SERPL-MCNC: 1.7 MG/DL — HIGH (ref 0.2–1.2)
BUN SERPL-MCNC: 16 MG/DL — SIGNIFICANT CHANGE UP (ref 7–23)
BUN SERPL-MCNC: 16 MG/DL — SIGNIFICANT CHANGE UP (ref 7–23)
BUN SERPL-MCNC: 17 MG/DL — SIGNIFICANT CHANGE UP (ref 7–23)
BUN SERPL-MCNC: 17 MG/DL — SIGNIFICANT CHANGE UP (ref 7–23)
CALCIUM SERPL-MCNC: 8.3 MG/DL — LOW (ref 8.4–10.5)
CALCIUM SERPL-MCNC: 8.3 MG/DL — LOW (ref 8.4–10.5)
CALCIUM SERPL-MCNC: 8.4 MG/DL — SIGNIFICANT CHANGE UP (ref 8.4–10.5)
CALCIUM SERPL-MCNC: 8.4 MG/DL — SIGNIFICANT CHANGE UP (ref 8.4–10.5)
CHLORIDE SERPL-SCNC: 106 MMOL/L — SIGNIFICANT CHANGE UP (ref 96–108)
CHLORIDE SERPL-SCNC: 106 MMOL/L — SIGNIFICANT CHANGE UP (ref 96–108)
CHLORIDE SERPL-SCNC: 109 MMOL/L — HIGH (ref 96–108)
CHLORIDE SERPL-SCNC: 109 MMOL/L — HIGH (ref 96–108)
CK SERPL-CCNC: 58 U/L — SIGNIFICANT CHANGE UP (ref 25–170)
CK SERPL-CCNC: 58 U/L — SIGNIFICANT CHANGE UP (ref 25–170)
CO2 SERPL-SCNC: 17 MMOL/L — LOW (ref 22–31)
CO2 SERPL-SCNC: 17 MMOL/L — LOW (ref 22–31)
CO2 SERPL-SCNC: 18 MMOL/L — LOW (ref 22–31)
CO2 SERPL-SCNC: 18 MMOL/L — LOW (ref 22–31)
CREAT SERPL-MCNC: 0.84 MG/DL — SIGNIFICANT CHANGE UP (ref 0.5–1.3)
CREAT SERPL-MCNC: 0.84 MG/DL — SIGNIFICANT CHANGE UP (ref 0.5–1.3)
CREAT SERPL-MCNC: 0.91 MG/DL — SIGNIFICANT CHANGE UP (ref 0.5–1.3)
CREAT SERPL-MCNC: 0.91 MG/DL — SIGNIFICANT CHANGE UP (ref 0.5–1.3)
EGFR: 77 ML/MIN/1.73M2 — SIGNIFICANT CHANGE UP
EGFR: 77 ML/MIN/1.73M2 — SIGNIFICANT CHANGE UP
EGFR: 85 ML/MIN/1.73M2 — SIGNIFICANT CHANGE UP
EGFR: 85 ML/MIN/1.73M2 — SIGNIFICANT CHANGE UP
EOSINOPHIL # BLD AUTO: 0 K/UL — SIGNIFICANT CHANGE UP (ref 0–0.5)
EOSINOPHIL # BLD AUTO: 0 K/UL — SIGNIFICANT CHANGE UP (ref 0–0.5)
EOSINOPHIL NFR BLD AUTO: 0 % — SIGNIFICANT CHANGE UP (ref 0–6)
EOSINOPHIL NFR BLD AUTO: 0 % — SIGNIFICANT CHANGE UP (ref 0–6)
ESTIMATED AVERAGE GLUCOSE: 123 MG/DL — HIGH (ref 68–114)
ESTIMATED AVERAGE GLUCOSE: 123 MG/DL — HIGH (ref 68–114)
GIANT PLATELETS BLD QL SMEAR: PRESENT — SIGNIFICANT CHANGE UP
GIANT PLATELETS BLD QL SMEAR: PRESENT — SIGNIFICANT CHANGE UP
GLUCOSE BLDC GLUCOMTR-MCNC: 107 MG/DL — HIGH (ref 70–99)
GLUCOSE BLDC GLUCOMTR-MCNC: 107 MG/DL — HIGH (ref 70–99)
GLUCOSE BLDC GLUCOMTR-MCNC: 114 MG/DL — HIGH (ref 70–99)
GLUCOSE BLDC GLUCOMTR-MCNC: 114 MG/DL — HIGH (ref 70–99)
GLUCOSE BLDC GLUCOMTR-MCNC: 119 MG/DL — HIGH (ref 70–99)
GLUCOSE BLDC GLUCOMTR-MCNC: 119 MG/DL — HIGH (ref 70–99)
GLUCOSE SERPL-MCNC: 101 MG/DL — HIGH (ref 70–99)
GLUCOSE SERPL-MCNC: 101 MG/DL — HIGH (ref 70–99)
GLUCOSE SERPL-MCNC: 114 MG/DL — HIGH (ref 70–99)
GLUCOSE SERPL-MCNC: 114 MG/DL — HIGH (ref 70–99)
HCT VFR BLD CALC: 32.8 % — LOW (ref 34.5–45)
HCT VFR BLD CALC: 32.8 % — LOW (ref 34.5–45)
HCT VFR BLD CALC: 34 % — LOW (ref 34.5–45)
HCT VFR BLD CALC: 34 % — LOW (ref 34.5–45)
HGB BLD-MCNC: 10.8 G/DL — LOW (ref 11.5–15.5)
HGB BLD-MCNC: 10.8 G/DL — LOW (ref 11.5–15.5)
HGB BLD-MCNC: 11.3 G/DL — LOW (ref 11.5–15.5)
HGB BLD-MCNC: 11.3 G/DL — LOW (ref 11.5–15.5)
LACTATE SERPL-SCNC: 3.9 MMOL/L — HIGH (ref 0.5–2)
LACTATE SERPL-SCNC: 3.9 MMOL/L — HIGH (ref 0.5–2)
LACTATE SERPL-SCNC: 4.3 MMOL/L — CRITICAL HIGH (ref 0.5–2)
LIDOCAIN IGE QN: 7 U/L — SIGNIFICANT CHANGE UP (ref 7–60)
LIDOCAIN IGE QN: 7 U/L — SIGNIFICANT CHANGE UP (ref 7–60)
LYMPHOCYTES # BLD AUTO: 0.88 K/UL — LOW (ref 1–3.3)
LYMPHOCYTES # BLD AUTO: 0.88 K/UL — LOW (ref 1–3.3)
LYMPHOCYTES # BLD AUTO: 7 % — LOW (ref 13–44)
LYMPHOCYTES # BLD AUTO: 7 % — LOW (ref 13–44)
MAGNESIUM SERPL-MCNC: 1.1 MG/DL — LOW (ref 1.6–2.6)
MAGNESIUM SERPL-MCNC: 1.1 MG/DL — LOW (ref 1.6–2.6)
MAGNESIUM SERPL-MCNC: 1.9 MG/DL — SIGNIFICANT CHANGE UP (ref 1.6–2.6)
MAGNESIUM SERPL-MCNC: 1.9 MG/DL — SIGNIFICANT CHANGE UP (ref 1.6–2.6)
MANUAL SMEAR VERIFICATION: SIGNIFICANT CHANGE UP
MANUAL SMEAR VERIFICATION: SIGNIFICANT CHANGE UP
MCHC RBC-ENTMCNC: 32.4 PG — SIGNIFICANT CHANGE UP (ref 27–34)
MCHC RBC-ENTMCNC: 32.4 PG — SIGNIFICANT CHANGE UP (ref 27–34)
MCHC RBC-ENTMCNC: 32.7 PG — SIGNIFICANT CHANGE UP (ref 27–34)
MCHC RBC-ENTMCNC: 32.7 PG — SIGNIFICANT CHANGE UP (ref 27–34)
MCHC RBC-ENTMCNC: 32.9 GM/DL — SIGNIFICANT CHANGE UP (ref 32–36)
MCHC RBC-ENTMCNC: 32.9 GM/DL — SIGNIFICANT CHANGE UP (ref 32–36)
MCHC RBC-ENTMCNC: 33.2 GM/DL — SIGNIFICANT CHANGE UP (ref 32–36)
MCHC RBC-ENTMCNC: 33.2 GM/DL — SIGNIFICANT CHANGE UP (ref 32–36)
MCV RBC AUTO: 98.3 FL — SIGNIFICANT CHANGE UP (ref 80–100)
MCV RBC AUTO: 98.3 FL — SIGNIFICANT CHANGE UP (ref 80–100)
MCV RBC AUTO: 98.5 FL — SIGNIFICANT CHANGE UP (ref 80–100)
MCV RBC AUTO: 98.5 FL — SIGNIFICANT CHANGE UP (ref 80–100)
METAMYELOCYTES # FLD: 2.6 % — HIGH (ref 0–0)
METAMYELOCYTES # FLD: 2.6 % — HIGH (ref 0–0)
MONOCYTES # BLD AUTO: 0.33 K/UL — SIGNIFICANT CHANGE UP (ref 0–0.9)
MONOCYTES # BLD AUTO: 0.33 K/UL — SIGNIFICANT CHANGE UP (ref 0–0.9)
MONOCYTES NFR BLD AUTO: 2.6 % — SIGNIFICANT CHANGE UP (ref 2–14)
MONOCYTES NFR BLD AUTO: 2.6 % — SIGNIFICANT CHANGE UP (ref 2–14)
NEUTROPHILS # BLD AUTO: 11.03 K/UL — HIGH (ref 1.8–7.4)
NEUTROPHILS # BLD AUTO: 11.03 K/UL — HIGH (ref 1.8–7.4)
NEUTROPHILS NFR BLD AUTO: 68.7 % — SIGNIFICANT CHANGE UP (ref 43–77)
NEUTROPHILS NFR BLD AUTO: 68.7 % — SIGNIFICANT CHANGE UP (ref 43–77)
NEUTS BAND # BLD: 19.1 % — HIGH (ref 0–8)
NEUTS BAND # BLD: 19.1 % — HIGH (ref 0–8)
NRBC # BLD: 0 /100 WBCS — SIGNIFICANT CHANGE UP (ref 0–0)
NRBC # BLD: 0 /100 WBCS — SIGNIFICANT CHANGE UP (ref 0–0)
PHOSPHATE SERPL-MCNC: 3.6 MG/DL — SIGNIFICANT CHANGE UP (ref 2.5–4.5)
PHOSPHATE SERPL-MCNC: 3.6 MG/DL — SIGNIFICANT CHANGE UP (ref 2.5–4.5)
PHOSPHATE SERPL-MCNC: 3.7 MG/DL — SIGNIFICANT CHANGE UP (ref 2.5–4.5)
PHOSPHATE SERPL-MCNC: 3.7 MG/DL — SIGNIFICANT CHANGE UP (ref 2.5–4.5)
PLAT MORPH BLD: ABNORMAL
PLAT MORPH BLD: ABNORMAL
PLATELET # BLD AUTO: 193 K/UL — SIGNIFICANT CHANGE UP (ref 150–400)
PLATELET # BLD AUTO: 193 K/UL — SIGNIFICANT CHANGE UP (ref 150–400)
PLATELET # BLD AUTO: 211 K/UL — SIGNIFICANT CHANGE UP (ref 150–400)
PLATELET # BLD AUTO: 211 K/UL — SIGNIFICANT CHANGE UP (ref 150–400)
POTASSIUM SERPL-MCNC: 4.1 MMOL/L — SIGNIFICANT CHANGE UP (ref 3.5–5.3)
POTASSIUM SERPL-MCNC: 4.1 MMOL/L — SIGNIFICANT CHANGE UP (ref 3.5–5.3)
POTASSIUM SERPL-MCNC: 4.2 MMOL/L — SIGNIFICANT CHANGE UP (ref 3.5–5.3)
POTASSIUM SERPL-MCNC: 4.2 MMOL/L — SIGNIFICANT CHANGE UP (ref 3.5–5.3)
POTASSIUM SERPL-SCNC: 4.1 MMOL/L — SIGNIFICANT CHANGE UP (ref 3.5–5.3)
POTASSIUM SERPL-SCNC: 4.1 MMOL/L — SIGNIFICANT CHANGE UP (ref 3.5–5.3)
POTASSIUM SERPL-SCNC: 4.2 MMOL/L — SIGNIFICANT CHANGE UP (ref 3.5–5.3)
POTASSIUM SERPL-SCNC: 4.2 MMOL/L — SIGNIFICANT CHANGE UP (ref 3.5–5.3)
PROT SERPL-MCNC: 6.2 G/DL — SIGNIFICANT CHANGE UP (ref 6–8.3)
PROT SERPL-MCNC: 6.2 G/DL — SIGNIFICANT CHANGE UP (ref 6–8.3)
RBC # BLD: 3.33 M/UL — LOW (ref 3.8–5.2)
RBC # BLD: 3.33 M/UL — LOW (ref 3.8–5.2)
RBC # BLD: 3.46 M/UL — LOW (ref 3.8–5.2)
RBC # BLD: 3.46 M/UL — LOW (ref 3.8–5.2)
RBC # FLD: 13.2 % — SIGNIFICANT CHANGE UP (ref 10.3–14.5)
RBC BLD AUTO: NORMAL — SIGNIFICANT CHANGE UP
RBC BLD AUTO: NORMAL — SIGNIFICANT CHANGE UP
SODIUM SERPL-SCNC: 138 MMOL/L — SIGNIFICANT CHANGE UP (ref 135–145)
SODIUM SERPL-SCNC: 138 MMOL/L — SIGNIFICANT CHANGE UP (ref 135–145)
SODIUM SERPL-SCNC: 143 MMOL/L — SIGNIFICANT CHANGE UP (ref 135–145)
SODIUM SERPL-SCNC: 143 MMOL/L — SIGNIFICANT CHANGE UP (ref 135–145)
TROPONIN T, HIGH SENSITIVITY RESULT: 7 NG/L — SIGNIFICANT CHANGE UP (ref 0–51)
TROPONIN T, HIGH SENSITIVITY RESULT: 7 NG/L — SIGNIFICANT CHANGE UP (ref 0–51)
TSH SERPL-MCNC: 2.76 UIU/ML — SIGNIFICANT CHANGE UP (ref 0.27–4.2)
TSH SERPL-MCNC: 2.76 UIU/ML — SIGNIFICANT CHANGE UP (ref 0.27–4.2)
WBC # BLD: 11.23 K/UL — HIGH (ref 3.8–10.5)
WBC # BLD: 11.23 K/UL — HIGH (ref 3.8–10.5)
WBC # BLD: 12.56 K/UL — HIGH (ref 3.8–10.5)
WBC # BLD: 12.56 K/UL — HIGH (ref 3.8–10.5)
WBC # FLD AUTO: 11.23 K/UL — HIGH (ref 3.8–10.5)
WBC # FLD AUTO: 11.23 K/UL — HIGH (ref 3.8–10.5)
WBC # FLD AUTO: 12.56 K/UL — HIGH (ref 3.8–10.5)
WBC # FLD AUTO: 12.56 K/UL — HIGH (ref 3.8–10.5)

## 2023-10-17 PROCEDURE — 71045 X-RAY EXAM CHEST 1 VIEW: CPT | Mod: 26

## 2023-10-17 PROCEDURE — 99223 1ST HOSP IP/OBS HIGH 75: CPT

## 2023-10-17 PROCEDURE — 99233 SBSQ HOSP IP/OBS HIGH 50: CPT | Mod: GC

## 2023-10-17 PROCEDURE — 76705 ECHO EXAM OF ABDOMEN: CPT | Mod: 26

## 2023-10-17 PROCEDURE — 99231 SBSQ HOSP IP/OBS SF/LOW 25: CPT

## 2023-10-17 RX ORDER — ACETAMINOPHEN 500 MG
1000 TABLET ORAL ONCE
Refills: 0 | Status: COMPLETED | OUTPATIENT
Start: 2023-10-17 | End: 2023-10-17

## 2023-10-17 RX ORDER — SODIUM CHLORIDE 9 MG/ML
1000 INJECTION INTRAMUSCULAR; INTRAVENOUS; SUBCUTANEOUS ONCE
Refills: 0 | Status: COMPLETED | OUTPATIENT
Start: 2023-10-17 | End: 2023-10-17

## 2023-10-17 RX ORDER — INFLUENZA VIRUS VACCINE 15; 15; 15; 15 UG/.5ML; UG/.5ML; UG/.5ML; UG/.5ML
0.5 SUSPENSION INTRAMUSCULAR ONCE
Refills: 0 | Status: DISCONTINUED | OUTPATIENT
Start: 2023-10-17 | End: 2023-10-23

## 2023-10-17 RX ORDER — SODIUM CHLORIDE 9 MG/ML
1000 INJECTION, SOLUTION INTRAVENOUS
Refills: 0 | Status: DISCONTINUED | OUTPATIENT
Start: 2023-10-17 | End: 2023-10-20

## 2023-10-17 RX ORDER — MAGNESIUM SULFATE 500 MG/ML
2 VIAL (ML) INJECTION ONCE
Refills: 0 | Status: COMPLETED | OUTPATIENT
Start: 2023-10-17 | End: 2023-10-17

## 2023-10-17 RX ADMIN — Medication 1000 MILLIGRAM(S): at 02:30

## 2023-10-17 RX ADMIN — Medication 400 MILLIGRAM(S): at 21:21

## 2023-10-17 RX ADMIN — SODIUM CHLORIDE 1000 MILLILITER(S): 9 INJECTION INTRAMUSCULAR; INTRAVENOUS; SUBCUTANEOUS at 09:45

## 2023-10-17 RX ADMIN — Medication 400 MILLIGRAM(S): at 02:19

## 2023-10-17 RX ADMIN — Medication 1000 MILLIGRAM(S): at 14:06

## 2023-10-17 RX ADMIN — Medication 400 MILLIGRAM(S): at 13:51

## 2023-10-17 RX ADMIN — SODIUM CHLORIDE 80 MILLILITER(S): 9 INJECTION, SOLUTION INTRAVENOUS at 21:21

## 2023-10-17 RX ADMIN — Medication 100 MILLIGRAM(S): at 11:20

## 2023-10-17 RX ADMIN — PIPERACILLIN AND TAZOBACTAM 25 GRAM(S): 4; .5 INJECTION, POWDER, LYOPHILIZED, FOR SOLUTION INTRAVENOUS at 18:18

## 2023-10-17 RX ADMIN — PIPERACILLIN AND TAZOBACTAM 25 GRAM(S): 4; .5 INJECTION, POWDER, LYOPHILIZED, FOR SOLUTION INTRAVENOUS at 02:19

## 2023-10-17 RX ADMIN — ENOXAPARIN SODIUM 40 MILLIGRAM(S): 100 INJECTION SUBCUTANEOUS at 06:09

## 2023-10-17 RX ADMIN — Medication 1 MILLIGRAM(S): at 11:20

## 2023-10-17 RX ADMIN — PANTOPRAZOLE SODIUM 40 MILLIGRAM(S): 20 TABLET, DELAYED RELEASE ORAL at 11:20

## 2023-10-17 RX ADMIN — PIPERACILLIN AND TAZOBACTAM 25 GRAM(S): 4; .5 INJECTION, POWDER, LYOPHILIZED, FOR SOLUTION INTRAVENOUS at 09:45

## 2023-10-17 RX ADMIN — Medication 1000 MILLIGRAM(S): at 22:00

## 2023-10-17 RX ADMIN — Medication 0.5 MILLIGRAM(S): at 00:53

## 2023-10-17 RX ADMIN — Medication 25 GRAM(S): at 03:10

## 2023-10-17 RX ADMIN — Medication 0.5 MILLIGRAM(S): at 21:21

## 2023-10-17 RX ADMIN — Medication 0.5 MILLIGRAM(S): at 12:08

## 2023-10-17 NOTE — PROGRESS NOTE ADULT - SUBJECTIVE AND OBJECTIVE BOX
INTERVAL HPI/OVERNIGHT EVENTS:  O/N: admit to SICU for sepsis 2/2 intraabdominal source - unclear. Amphetamine + on Utox - takes vyvanese daily. POCUS - [place] Bs at bases b/l, heart contracility wnl, IVC without resp vaiariability. Holding IVF given B lines and adequately resuscitated. Ativan added q4. Repeat lactate at 2 AM 4.3 (5.6). Abdominal exam and pain improving. Troponin and lipase wnl.    SUBJECTIVE:  Patient seen and examined by chief resident and team on AM rounds. Patient reports that pain is improved. No nausea or vomiting, +F/+bm (reports very small BM). WBC 11(13), Lactate improving 4.3 (5.6).     MEDICATIONS  (STANDING):  chlorhexidine 4% Liquid 1 Application(s) Topical <User Schedule>  enoxaparin Injectable 40 milliGRAM(s) SubCutaneous every 24 hours  folic acid Injectable 1 milliGRAM(s) IV Push daily  influenza   Vaccine 0.5 milliLiter(s) IntraMuscular once  insulin lispro (ADMELOG) corrective regimen sliding scale   SubCutaneous every 6 hours  pantoprazole  Injectable 40 milliGRAM(s) IV Push daily  piperacillin/tazobactam IVPB.. 3.375 Gram(s) IV Intermittent every 8 hours  thiamine Injectable 100 milliGRAM(s) IV Push daily    MEDICATIONS  (PRN):  LORazepam   Injectable 0.5 milliGRAM(s) IV Push every 4 hours PRN Anxiety      Vital Signs Last 24 Hrs  T(C): 37.1 (17 Oct 2023 11:15), Max: 37.9 (16 Oct 2023 17:12)  T(F): 98.8 (17 Oct 2023 11:15), Max: 100.3 (16 Oct 2023 17:12)  HR: 124 (17 Oct 2023 14:00) (104 - 128)  BP: 139/89 (17 Oct 2023 14:00) (81/50 - 152/102)  BP(mean): 109 (17 Oct 2023 14:00) (65 - 120)  RR: 27 (17 Oct 2023 14:00) (16 - 38)  SpO2: 96% (17 Oct 2023 14:00) (93% - 99%)    Parameters below as of 17 Oct 2023 14:00  Patient On (Oxygen Delivery Method): room air        PHYSICAL EXAM:  Constitutional: A&Ox3  Respiratory: non labored breathing, no respiratory distress  Cardiovascular: NSR, RRR  Gastrointestinal: Soft, NT, moderately distended abdomen. no rebound or guarding.   Extremities: (-) edema            I&O's Detail    16 Oct 2023 07:01  -  17 Oct 2023 07:00  --------------------------------------------------------  IN:    IV PiggyBack: 50 mL    IV PiggyBack: 1100 mL    Lactated Ringers Bolus: 1000 mL  Total IN: 2150 mL    OUT:    Voided (mL): 550 mL  Total OUT: 550 mL    Total NET: 1600 mL      17 Oct 2023 07:01  -  17 Oct 2023 14:30  --------------------------------------------------------  IN:    IV PiggyBack: 200 mL    Sodium Chloride 0.9% Bolus: 1000 mL  Total IN: 1200 mL    OUT:    Voided (mL): 600 mL  Total OUT: 600 mL    Total NET: 600 mL          LABS:                        10.8   12.56 )-----------( 211      ( 17 Oct 2023 05:30 )             32.8     10-17    138  |  106  |  17  ----------------------------<  114<H>  4.1   |  18<L>  |  0.84    Ca    8.4      17 Oct 2023 05:30  Phos  3.6     10-17  Mg     1.9     10-17    TPro  6.2  /  Alb  3.3  /  TBili  1.7<H>  /  DBili  x   /  AST  53<H>  /  ALT  42  /  AlkPhos  98  10-17    PT/INR - ( 16 Oct 2023 20:47 )   PT: 15.0 sec;   INR: 1.33          PTT - ( 16 Oct 2023 20:47 )  PTT:31.2 sec  Urinalysis Basic - ( 17 Oct 2023 05:30 )    Color: x / Appearance: x / SG: x / pH: x  Gluc: 114 mg/dL / Ketone: x  / Bili: x / Urobili: x   Blood: x / Protein: x / Nitrite: x   Leuk Esterase: x / RBC: x / WBC x   Sq Epi: x / Non Sq Epi: x / Bacteria: x        RADIOLOGY & ADDITIONAL STUDIES:

## 2023-10-17 NOTE — DIETITIAN INITIAL EVALUATION ADULT - PERTINENT LABORATORY DATA
10-17    138  |  106  |  17  ----------------------------<  114<H>  4.1   |  18<L>  |  0.84    Ca    8.4      17 Oct 2023 05:30  Phos  3.6     10-17  Mg     1.9     10-17    TPro  6.2  /  Alb  3.3  /  TBili  1.7<H>  /  DBili  x   /  AST  53<H>  /  ALT  42  /  AlkPhos  98  10-17  POCT Blood Glucose.: 114 mg/dL (10-17-23 @ 00:05)  A1C with Estimated Average Glucose Result: 5.9 % (10-17-23 @ 05:30)

## 2023-10-17 NOTE — CONSULT NOTE ADULT - SUBJECTIVE AND OBJECTIVE BOX
ICU CONSULT NOTE    47 yo F with PMHx of HTN, HLD and anxiety and PSHx of remote appendectomy who presented with acute onset abdominal pain and found to be tachycardic, hypotensive, temp to 100.3, leukocytosis to 13, with lactate on 7 suggestive of sepsis. Patient admitted to SICU 10/16 for hemodynamic monitoring. Treated with zosyn. Patient remains tachycardic, however MAPs >65 and a-febrile. Exam noteable for persistent abd distention and tenderness. WBC stable and mildly elevated and lactate trend 7 -> 5.6 -> 4.3. Patient also with resolving mild transaminitis and elevated Tbili (i/s/o known alcohol use disorder). CT A/P 10/17 suggestive of enteritis without other acute pathologic process. Patient evaluated by GYN with no concern for acute GYN process. ICU consulted for possible transfer to medicine and triaging.    SUBJECTIVE / INTERVAL HPI: Patient seen and examined at bedside. Overall feeling a little better. Reports abd pain improved. Was 10/10 upon arrival, now 5/10 with pain medications. Is having daily bowel movements, loose but not watery. Denies headache, CP, palpitations, n/v, hematochezia, melena, vaginal bleeding.    Medical History: HTN, HLD, anxiety  Surgical History: Appendectomy, Breast implants  Medications: Lisinopril 10, Seroquel 25 qd, Atorvastatin 20 qd, Vivanes qd  Allergies: NKDA  Social History: Patient states she smokes and tobacco occasionally (1-2 times per week). States she drinks 2 alcoholic beverages >5 times per week. Denies any other drug use including amphetamines.    ROS: negative unless otherwise stated above.    VITAL SIGNS:  Vital Signs Last 24 Hrs  T(C): 37.1 (17 Oct 2023 11:15), Max: 37.9 (16 Oct 2023 17:12)  T(F): 98.8 (17 Oct 2023 11:15), Max: 100.3 (16 Oct 2023 17:12)  HR: 119 (17 Oct 2023 15:00) (104 - 128)  BP: 156/93 (17 Oct 2023 15:00) (81/50 - 156/93)  BP(mean): 120 (17 Oct 2023 15:00) (65 - 120)  RR: 32 (17 Oct 2023 15:00) (16 - 38)  SpO2: 95% (17 Oct 2023 15:00) (93% - 99%)    Parameters below as of 17 Oct 2023 15:00  Patient On (Oxygen Delivery Method): room air          10-16-23 @ 07:01  -  10-17-23 @ 07:00  --------------------------------------------------------  IN: 2150 mL / OUT: 550 mL / NET: 1600 mL    10-17-23 @ 07:01  -  10-17-23 @ 15:41  --------------------------------------------------------  IN: 1200 mL / OUT: 600 mL / NET: 600 mL        PHYSICAL EXAM:    General: NAD, laying in bed  HEENT: dry oral MM, EOMI  Neck: supple  Cardiovascular: +S1/S2; tachycardic, regular  Respiratory: CTA B/L; no W/R/R, no accessory musc use  Gastrointestinal: soft, diffuse tenderness to moderate palpation  Extremities: warm and dry, moving all extremities  Vascular: 2+ radial, 1+ DP pulses B/L, no LE edema  Neurological: AAOx3; CNs grossly intact, no focal deficits  Psych: calm, appropriate      MEDICATIONS:  MEDICATIONS  (STANDING):  chlorhexidine 4% Liquid 1 Application(s) Topical <User Schedule>  enoxaparin Injectable 40 milliGRAM(s) SubCutaneous every 24 hours  folic acid Injectable 1 milliGRAM(s) IV Push daily  influenza   Vaccine 0.5 milliLiter(s) IntraMuscular once  insulin lispro (ADMELOG) corrective regimen sliding scale   SubCutaneous every 6 hours  pantoprazole  Injectable 40 milliGRAM(s) IV Push daily  piperacillin/tazobactam IVPB.. 3.375 Gram(s) IV Intermittent every 8 hours  thiamine Injectable 100 milliGRAM(s) IV Push daily    MEDICATIONS  (PRN):  LORazepam   Injectable 0.5 milliGRAM(s) IV Push every 4 hours PRN Anxiety      ALLERGIES:  Allergies    No Known Allergies    Intolerances        LABS:                        10.8   12.56 )-----------( 211      ( 17 Oct 2023 05:30 )             32.8     10-17    138  |  106  |  17  ----------------------------<  114<H>  4.1   |  18<L>  |  0.84    Ca    8.4      17 Oct 2023 05:30  Phos  3.6     10-17  Mg     1.9     10-17    TPro  6.2  /  Alb  3.3  /  TBili  1.7<H>  /  DBili  x   /  AST  53<H>  /  ALT  42  /  AlkPhos  98  10-17    PT/INR - ( 16 Oct 2023 20:47 )   PT: 15.0 sec;   INR: 1.33          PTT - ( 16 Oct 2023 20:47 )  PTT:31.2 sec  Urinalysis Basic - ( 17 Oct 2023 05:30 )    Color: x / Appearance: x / SG: x / pH: x  Gluc: 114 mg/dL / Ketone: x  / Bili: x / Urobili: x   Blood: x / Protein: x / Nitrite: x   Leuk Esterase: x / RBC: x / WBC x   Sq Epi: x / Non Sq Epi: x / Bacteria: x      CAPILLARY BLOOD GLUCOSE      POCT Blood Glucose.: 119 mg/dL (17 Oct 2023 13:19)      RADIOLOGY & ADDITIONAL TESTS: Reviewed.

## 2023-10-17 NOTE — CONSULT NOTE ADULT - SUBJECTIVE AND OBJECTIVE BOX
AUBREE ALVARADO  48y  Female    47 yo F with PMHx of HTN, HLD and anxiety and PSHx of remote appendectomy who presented with acute onset abdominal pain and found to be tachycardic, hypotensive, temp to 100.3, leukocytosis to 13, with lactate on 7 suggestive of sepsis of unknown origine. Patient admitted to SICU 10/16 for hemodynamic monitoring. Patient remains tachycardic, however MAPs >65 and a-febrile. Exam noteable for persistent abd distention and tenderness. WBC resolved and lactate trend 7 -> 5.6 -> 4.3 -> 4.3 most recently. Patient also with resolving mild transaminitis and elevated Tbili (i/s/o known alcohol use disorder). CT A/P 10/17 suggestive of enteritis without other acute pathologic process. Patient evaluated by GYN with no concern for acute GYN process. Given HD stability and ongoing severe sepsis (suspected enteritis), SICU consulted medicine for potential transfer to regional medical floors.         PAST MEDICAL/SURGICAL HISTORY  PAST MEDICAL & SURGICAL HISTORY:  COVID-19      History of laparoscopic appendectomy          REVIEW OF SYSTEMS:  CONSTITUTIONAL: No fever, weight loss, or fatigue  EYES: No eye pain, visual disturbances, or discharge  ENMT:  No difficulty hearing, tinnitus, vertigo; No sinus or throat pain  NECK: No pain or stiffness  BREASTS: No pain, masses, or nipple discharge  RESPIRATORY: No cough, wheezing, chills or hemoptysis; No shortness of breath  CARDIOVASCULAR: No chest pain, palpitations, dizziness, or leg swelling  GASTROINTESTINAL: No abdominal or epigastric pain. No nausea, vomiting, or hematemesis; No diarrhea or constipation. No melena or hematochezia.  GENITOURINARY: No dysuria, frequency, hematuria, or incontinence  NEUROLOGICAL: No headaches, memory loss, loss of strength, numbness, or tremors  SKIN: No itching, burning, rashes, or lesions   LYMPH NODES: No enlarged glands  ENDOCRINE: No heat or cold intolerance; No hair loss  MUSCULOSKELETAL: No joint pain or swelling; No muscle, back, or extremity pain  PSYCHIATRIC: No depression, anxiety, mood swings, or difficulty sleeping  HEME/LYMPH: No easy bruising, or bleeding gums  ALLERY AND IMMUNOLOGIC: No hives or eczema    T(C): 37 (10-17-23 @ 07:00), Max: 37.9 (10-16-23 @ 17:12)  HR: 111 (10-17-23 @ 10:00) (104 - 128)  BP: 119/82 (10-17-23 @ 10:00) (81/50 - 142/67)  RR: 27 (10-17-23 @ 10:00) (16 - 38)  SpO2: 95% (10-17-23 @ 10:00) (93% - 99%)  Wt(kg): --Vital Signs Last 24 Hrs  T(C): 37 (17 Oct 2023 07:00), Max: 37.9 (16 Oct 2023 17:12)  T(F): 98.6 (17 Oct 2023 07:00), Max: 100.3 (16 Oct 2023 17:12)  HR: 111 (17 Oct 2023 10:00) (104 - 128)  BP: 119/82 (17 Oct 2023 10:00) (81/50 - 142/67)  BP(mean): 97 (17 Oct 2023 10:00) (65 - 105)  RR: 27 (17 Oct 2023 10:00) (16 - 38)  SpO2: 95% (17 Oct 2023 10:00) (93% - 99%)    Parameters below as of 17 Oct 2023 10:00  Patient On (Oxygen Delivery Method): room air        PHYSICAL EXAM:  GENERAL: NAD, well-groomed, well-developed  HEAD:  Atraumatic, Normocephalic  EYES: EOMI, PERRLA, conjunctiva and sclera clear  ENMT: No tonsillar erythema, exudates, or enlargement; Moist mucous membranes, Good dentition, No lesions  NECK: Supple, No JVD, Normal thyroid  NERVOUS SYSTEM:  Alert & Oriented X3, Good concentration; Motor Strength 5/5 B/L upper and lower extremities; DTRs 2+ intact and symmetric  CHEST/LUNG: Clear to percussion bilaterally; No rales, rhonchi, wheezing, or rubs  HEART: Regular rate and rhythm; No murmurs, rubs, or gallops  ABDOMEN: Soft, Nontender, Nondistended; Bowel sounds present  EXTREMITIES:  2+ Peripheral Pulses, No clubbing, cyanosis, or edema  LYMPH: No lymphadenopathy noted  SKIN: No rashes or lesions    Consultant(s) Notes Reviewed:  [x ] YES  [ ] NO  Care Discussed with Consultants/Other Providers [ x] YES  [ ] NO    LABS:  CBC   10-17-23 @ 05:30  Hematcorit 32.8  Hemoglobin 10.8  Mean Cell Hemoglobin 32.4  Platelet Count-Automated 211  RBC Count 3.33  Red Cell Distrib Width 13.2  Wbc Count 12.56  10-17-23 @ 01:42  Hematcorit 34.0  Hemoglobin 11.3  Mean Cell Hemoglobin 32.7  Platelet Count-Automated 193  RBC Count 3.46  Red Cell Distrib Width 13.2  Wbc Count 11.23  10-16-23 @ 17:02  Hematcorit 38.6  Hemoglobin 12.6  Mean Cell Hemoglobin 32.0  Platelet Count-Automated 265  RBC Count 3.94  Red Cell Distrib Width 13.1  Wbc Count 13.71      BMP  10-17-23 @ 05:30  Anion Gap. Serum 14  Blood Urea Nitrogen,Serm 17  Calcium, Total Serum 8.4  Carbon Dioxide, Serum 18  Chloride, Serum 106  Creatinine, Serum 0.84  eGFR in  --  eGFR in Non Afican American --  Gloucose, serum 114  Potassium, Serum 4.1  Sodium, Serum 138              10-17-23 @ 01:42  Anion Gap. Serum 17  Blood Urea Nitrogen,Serm 16  Calcium, Total Serum 8.3  Carbon Dioxide, Serum 17  Chloride, Serum 109  Creatinine, Serum 0.91  eGFR in  --  eGFR in Non Afican American --  Gloucose, serum 101  Potassium, Serum 4.2  Sodium, Serum 143              10-16-23 @ 17:02  Anion Gap. Serum 21  Blood Urea Nitrogen,Serm 13  Calcium, Total Serum 9.4  Carbon Dioxide, Serum 19  Chloride, Serum 102  Creatinine, Serum 1.01  eGFR in  --  eGFR in Non Afican American --  Gloucose, serum 118  Potassium, Serum 3.5  Sodium, Serum 142                  CMP  10-17-23 @ 05:30  Brandie Aminotransferase(ALT/SGPT)--  Albumin, Serum --  Alkaline Phosphatase, Serum --  Anion Gap, Serum 14  Aspartate Aminotransferase (AST/SGOT)--  Bilirubin Total, Serum --  Blood Urea Nitrogen, Serum 17  Calcium,Total Serum 8.4  Carbon Dioxide, Serum 18  Chloride, Serum 106  Creatinine, Serum 0.84  eGFR if  --  eGFR if Non African American --  Glucose, Serum 114  Potassium, Serum 4.1  Protein Total, Serum --  Sodium, Serum 138                      10-17-23 @ 01:42  Brandie Aminotransferase(ALT/SGPT)42  Albumin, Serum 3.3  Alkaline Phosphatase, Serum 98  Anion Gap, Serum 17  Aspartate Aminotransferase (AST/SGOT)53  Bilirubin Total, Serum 1.7  Blood Urea Nitrogen, Serum 16  Calcium,Total Serum 8.3  Carbon Dioxide, Serum 17  Chloride, Serum 109  Creatinine, Serum 0.91  eGFR if  --  eGFR if Non African American --  Glucose, Serum 101  Potassium, Serum 4.2  Protein Total, Serum 6.2  Sodium, Serum 143                      10-16-23 @ 17:02  Brandie Aminotransferase(ALT/SGPT)58  Albumin, Serum 3.9  Alkaline Phosphatase, Serum 135  Anion Gap, Serum 21  Aspartate Aminotransferase (AST/SGOT)90  Bilirubin Total, Serum 2.5  Blood Urea Nitrogen, Serum 13  Calcium,Total Serum 9.4  Carbon Dioxide, Serum 19  Chloride, Serum 102  Creatinine, Serum 1.01  eGFR if  --  eGFR if Non African American --  Glucose, Serum 118  Potassium, Serum 3.5  Protein Total, Serum 7.2  Sodium, Serum 142                          PT/INR  PT/INR  10-16-23 @ 20:47  INR 1.33  Prothrombin Time Comment --  Prothrobin Time, [id number]  PT/INR  10-16-23 @ 19:37  INR 1.34  Prothrombin Time Comment --  Prothrobin Time, [id number]      Amylase/Lipase  10-17-23 @ 01:42  Amylase, Serum Total --  Lipase, Serum 7            RADIOLOGY & ADDITIONAL TESTS:    Imaging Personally Reviewed:  [ ] YES  [ ] NO AUBREE ALVARADO  48y  Female    49 yo F with PMHx of HTN, HLD and anxiety and PSHx of remote appendectomy who presented with acute onset abdominal pain and found to be tachycardic, hypotensive, temp to 100.3, leukocytosis to 13, with lactate on 7 suggestive of sepsis of unknown origine. Patient admitted to SICU 10/16 for hemodynamic monitoring. Patient remains tachycardic, however MAPs >65 and a-febrile. Exam noteable for persistent abd distention and tenderness. WBC resolved and lactate trend 7 -> 5.6 -> 4.3 -> 4.3 most recently. Patient also with resolving mild transaminitis and elevated Tbili (i/s/o known alcohol use disorder). CT A/P 10/17 suggestive of enteritis without other acute pathologic process. Patient evaluated by GYN with no concern for acute GYN process. Given HD stability and ongoing severe sepsis (suspected enteritis), SICU consulted medicine for potential transfer to regional medical floors.       PAST MEDICAL/SURGICAL HISTORY  PAST MEDICAL & SURGICAL HISTORY:  COVID-19    History of laparoscopic appendectomy      T(C): 37 (10-17-23 @ 07:00), Max: 37.9 (10-16-23 @ 17:12)  HR: 111 (10-17-23 @ 10:00) (104 - 128)  BP: 119/82 (10-17-23 @ 10:00) (81/50 - 142/67)  RR: 27 (10-17-23 @ 10:00) (16 - 38)  SpO2: 95% (10-17-23 @ 10:00) (93% - 99%)  Wt(kg): --Vital Signs Last 24 Hrs  T(C): 37 (17 Oct 2023 07:00), Max: 37.9 (16 Oct 2023 17:12)  T(F): 98.6 (17 Oct 2023 07:00), Max: 100.3 (16 Oct 2023 17:12)  HR: 111 (17 Oct 2023 10:00) (104 - 128)  BP: 119/82 (17 Oct 2023 10:00) (81/50 - 142/67)  BP(mean): 97 (17 Oct 2023 10:00) (65 - 105)  RR: 27 (17 Oct 2023 10:00) (16 - 38)  SpO2: 95% (17 Oct 2023 10:00) (93% - 99%)    Parameters below as of 17 Oct 2023 10:00  Patient On (Oxygen Delivery Method): room air        PHYSICAL EXAM:  GENERAL: NAD, well-groomed, well-developed  HEAD:  Atraumatic, Normocephalic  EYES: EOMI, PERRLA, conjunctiva and sclera clear  ENMT: No tonsillar erythema, exudates, or enlargement; Moist mucous membranes, Good dentition, No lesions  NERVOUS SYSTEM:  Alert & Oriented X3, Good concentration  CHEST/LUNG: Clear to percussion bilaterally; No rales, rhonchi, wheezing, or rubs  HEART: Regular rate and tachycardic; No murmurs, rubs, or gallops  ABDOMEN: mild distension, TTP diffusely, tympanic   EXTREMITIES:  2+ Peripheral Pulses, No clubbing, cyanosis, or edema  LYMPH: No lymphadenopathy noted  SKIN: No rashes or lesions    Consultant(s) Notes Reviewed:  [x ] YES  [ ] NO  Care Discussed with Consultants/Other Providers [ x] YES  [ ] NO    LABS:  CBC   10-17-23 @ 05:30  Hematcorit 32.8  Hemoglobin 10.8  Mean Cell Hemoglobin 32.4  Platelet Count-Automated 211  RBC Count 3.33  Red Cell Distrib Width 13.2  Wbc Count 12.56  10-17-23 @ 01:42  Hematcorit 34.0  Hemoglobin 11.3  Mean Cell Hemoglobin 32.7  Platelet Count-Automated 193  RBC Count 3.46  Red Cell Distrib Width 13.2  Wbc Count 11.23  10-16-23 @ 17:02  Hematcorit 38.6  Hemoglobin 12.6  Mean Cell Hemoglobin 32.0  Platelet Count-Automated 265  RBC Count 3.94  Red Cell Distrib Width 13.1  Wbc Count 13.71      BMP  10-17-23 @ 05:30  Anion Gap. Serum 14  Blood Urea Nitrogen,Serm 17  Calcium, Total Serum 8.4  Carbon Dioxide, Serum 18  Chloride, Serum 106  Creatinine, Serum 0.84  eGFR in  --  eGFR in Non Afican American --  Gloucose, serum 114  Potassium, Serum 4.1  Sodium, Serum 138              10-17-23 @ 01:42  Anion Gap. Serum 17  Blood Urea Nitrogen,Serm 16  Calcium, Total Serum 8.3  Carbon Dioxide, Serum 17  Chloride, Serum 109  Creatinine, Serum 0.91  eGFR in  --  eGFR in Non Afican American --  Gloucose, serum 101  Potassium, Serum 4.2  Sodium, Serum 143              10-16-23 @ 17:02  Anion Gap. Serum 21  Blood Urea Nitrogen,Serm 13  Calcium, Total Serum 9.4  Carbon Dioxide, Serum 19  Chloride, Serum 102  Creatinine, Serum 1.01  eGFR in  --  eGFR in Non Afican American --  Gloucose, serum 118  Potassium, Serum 3.5  Sodium, Serum 142                  CMP  10-17-23 @ 05:30  Brandie Aminotransferase(ALT/SGPT)--  Albumin, Serum --  Alkaline Phosphatase, Serum --  Anion Gap, Serum 14  Aspartate Aminotransferase (AST/SGOT)--  Bilirubin Total, Serum --  Blood Urea Nitrogen, Serum 17  Calcium,Total Serum 8.4  Carbon Dioxide, Serum 18  Chloride, Serum 106  Creatinine, Serum 0.84  eGFR if  --  eGFR if Non African American --  Glucose, Serum 114  Potassium, Serum 4.1  Protein Total, Serum --  Sodium, Serum 138                      10-17-23 @ 01:42  Brandie Aminotransferase(ALT/SGPT)42  Albumin, Serum 3.3  Alkaline Phosphatase, Serum 98  Anion Gap, Serum 17  Aspartate Aminotransferase (AST/SGOT)53  Bilirubin Total, Serum 1.7  Blood Urea Nitrogen, Serum 16  Calcium,Total Serum 8.3  Carbon Dioxide, Serum 17  Chloride, Serum 109  Creatinine, Serum 0.91  eGFR if  --  eGFR if Non African American --  Glucose, Serum 101  Potassium, Serum 4.2  Protein Total, Serum 6.2  Sodium, Serum 143                      10-16-23 @ 17:02  Brandie Aminotransferase(ALT/SGPT)58  Albumin, Serum 3.9  Alkaline Phosphatase, Serum 135  Anion Gap, Serum 21  Aspartate Aminotransferase (AST/SGOT)90  Bilirubin Total, Serum 2.5  Blood Urea Nitrogen, Serum 13  Calcium,Total Serum 9.4  Carbon Dioxide, Serum 19  Chloride, Serum 102  Creatinine, Serum 1.01  eGFR if  --  eGFR if Non African American --  Glucose, Serum 118  Potassium, Serum 3.5  Protein Total, Serum 7.2  Sodium, Serum 142                          PT/INR  PT/INR  10-16-23 @ 20:47  INR 1.33  Prothrombin Time Comment --  Prothrobin Time, [id number]  PT/INR  10-16-23 @ 19:37  INR 1.34  Prothrombin Time Comment --  Prothrobin Time, [id number]      Amylase/Lipase  10-17-23 @ 01:42  Amylase, Serum Total --  Lipase, Serum 7            RADIOLOGY & ADDITIONAL TESTS:    Imaging Personally Reviewed:  [ ] YES  [ ] NO

## 2023-10-17 NOTE — DIETITIAN INITIAL EVALUATION ADULT - ADD RECOMMEND
1. Initiate nutrition within 24-48hrs  - if PO, consider no citrus/no spice/no caffeine diet  - monitor tolerance closely, monitor for s/s GI distress  2. c/w IV thiamine, folic acid while NPO  - recommend daily MVI  3. Hydration per team  4. Monitor chemistry, GI function, skin integrity  5. Pain & bowel regimen per team

## 2023-10-17 NOTE — DIETITIAN INITIAL EVALUATION ADULT - OTHER CALCULATIONS
Ideal body weight (54.4kg) used for calculations as pt >100% IBW and BMI <30 per [facility] Standards of Care. Needs estimated for age and adjusted for current clinical status, increased needs for clinical status

## 2023-10-17 NOTE — CONSULT NOTE ADULT - ASSESSMENT
47 yo F with PMHx of HTN, HLD and anxiety and PSHx of remote appendectomy who presented with acute onset abdominal pain, admitted for severe sepsis (tachycadria, bandemia to 19%, respiratory rate in the 30's, elevated lactate, source/suspected enteritis on broad antibiotic coverage with zosyn. Given UTox on admission + for opioids, cannot exclude opioid WD (COWS Score of 10).     #Severe Sepsis   Suspected source of enteritis. Surgery and GYN have ruled out intra-abdominal pathology. Now s/p 5L isotonic fluid restoration with lactate that is slowly clearing, latest 3.9. UA equivocal, CXR clear, no skin lesions. Patient has had a cough recently.   - Continue broad spectrum Abx coverage given severe sepsis, would consider increasing to pseudomonal coverage dose Zosyn or Cefepime if no significant improvement   - Rec ICU Consult for persistent tachycardia and elevated respiratory rate   - continue fluid restoration with a goal UOP of 1cc/kg/hr   - follow UCx and BCx     #Anxiety   Patient takes Seroquel 100mg and Saphris 5mg nightly for anxiety, states that the lack of these medications is contributing to significant anxiety now.   - consider reinstating home medications given hemodynamic stability following fluid restoration   - rec monitoring for opioid WD - COWS Score 10 on exam     #ADHD  Patient reports 60mg vyvance daily at home.   - would avoid this med given persistent tachycardia at this time

## 2023-10-17 NOTE — CONSULT NOTE ADULT - ASSESSMENT
49 yo F with PMHx of HTN, HLD and anxiety and PSHx of remote appendectomy who presented with acute onset abdominal pain and found to be tachycardic, hypotensive, temp to 100.3, leukocytosis to 13, with lactate on 7 suggestive of sepsis. Patient admitted to SICU 10/16 for hemodynamic monitoring. Treated with zosyn. Patient remains tachycardic, however MAPs >65 and a-febrile. Exam noteable for persistent abd distention and tenderness. WBC stable and mildly elevated and lactate trend 7 -> 5.6 -> 4.3. Patient also with resolving mild transaminitis and elevated Tbili (i/s/o known alcohol use disorder). CT A/P 10/17 suggestive of enteritis without other acute pathologic process. Patient evaluated by GYN with no concern for acute GYN process. ICU consulted for possible transfer to medicine and triaging.      Initially presented with severe sepsis (but not in shock), has received almost 5L IVF with improvement in BP and downtrending lactate. Still meeting sepsis criteria. No other focal subjective signs of infection other than enteritis.  Has been received zosyn, with stable mild leukocytosis (with significant bandemia) and only a low grade temp upon admission but otherwise afebrile.      #sepsis  #enteritis      # tachycardia

## 2023-10-17 NOTE — PATIENT PROFILE ADULT - FALL HARM RISK - UNIVERSAL INTERVENTIONS
Bed in lowest position, wheels locked, appropriate side rails in place/Call bell, personal items and telephone in reach/Instruct patient to call for assistance before getting out of bed or chair/Non-slip footwear when patient is out of bed/Omega to call system/Physically safe environment - no spills, clutter or unnecessary equipment/Purposeful Proactive Rounding/Room/bathroom lighting operational, light cord in reach

## 2023-10-17 NOTE — CONSULT NOTE ADULT - ATTENDING COMMENTS
47 yo F with PMHx of HTN, HLD and anxiety and PSHx of remote appendectomy who presented with acute onset abdominal pain, admitted for severe sepsis (tachycadria, bandemia to 19%, respiratory rate in the 30's, elevated lactate, source/suspected enteritis on broad antibiotic coverage with zosyn. Given UTox on admission + for opioids/THC/amphetamine -pt taking Saphris/seroquel for anxiety - fluid resuscitated in sicu -     pt seen and examined with Dr. Parkinson  - denies opoid use (though utox positive for opoids ) reported mariguana use.   - still with abdominal pain, loose stool -more formed on the last one.  - remain tachycardiac in 120s on monitor , tachypnea in high 20 to low 30s on bedside- mentating well and able to relate history.  on exam appear tired, RRR with tachycardia in 120s, tachypenic  moving good air  bs present, no guarding, no localized tenderness, no rebound.  ext- no edema no tenderness.    labs/imaging reviewed.    severe sepsis -likely source abdomen -enteritis ( diarrhea )  bandemia  resolving lactic acidosis after more than 5 L of fluid resuscitation.  though hypotension responsive to IVF , she remain tachycardiac and tachypneic - would need close monitoring and resuscitation, waiting for stool specimen to rule out c diff- on BS antibiotics with Zosyn, fu blood cx sent     anxiety on seroquel/saphris at home   - would restart seroquel if qtc ok  - would get psy to weight in for Saprhis     - substance use disorder  u tox positive for thc, amphetamine, opoids- would monitor for withdrawal   trend cbc closely , fluid resuscitation.  evaluated by surgery and gyn -no intervention planned.  pt is not ready for GMF -advise to consult [facility] if there is no surgical intervention planned    thank you for allowing medicine to participate in the care. ronald Parkinson. 49 yo F with PMHx of HTN, HLD and anxiety and PSHx of remote appendectomy who presented with acute onset abdominal pain, admitted for severe sepsis (tachycadria, bandemia to 19%, respiratory rate in the 30's, elevated lactate, source/suspected enteritis on broad antibiotic coverage with zosyn. Given UTox on admission + for opioids/THC/amphetamine -pt taking Saphris/seroquel for anxiety - fluid resuscitated in sicu -     pt seen and examined with Dr. Parkinson,   - denies opoid use (though utox positive for opoids ) reported mariguana use.   - still with abdominal pain, loose stool -more formed on the last one.  - remain tachycardiac in 120s on monitor , tachypnea in high 20 to low 30s on bedside- mentating well and able to relate history.  on exam appear tired, RRR with tachycardia in 120s, tachypenic  moving good air  bs present, no guarding, no localized tenderness, no rebound.  ext- no edema no tenderness.    labs/imaging reviewed. notes/meds reviewed.    severe sepsis -likely source abdomen -enteritis ( diarrhea )  bandemia  resolving lactic acidosis after more than 5 L of fluid resuscitation.  though hypotension responsive to IVF , she remain tachycardiac and tachypneic - would need close monitoring and resuscitation, waiting for stool specimen to rule out c diff- on BS antibiotics with Zosyn, fu blood cx sent     anxiety on seroquel/saphris at home   - would restart seroquel if qtc ok  - would get psy to weight in for Saprhis     - substance use disorder  u tox positive for thc, amphetamine, opoids- would monitor for withdrawal   trend cbc closely , fluid resuscitation.  evaluated by surgery and gyn -no intervention planned.  pt is not ready for GMF -advise to consult med [employer] if there is no surgical intervention planned    thank you for allowing medicine to participate in the care. ronald Parkinson.

## 2023-10-17 NOTE — DIETITIAN INITIAL EVALUATION ADULT - NUTRITIONGOAL OUTCOME1
Initiate nutrition within 24-48hrs  Pt will meet at least 75% of protein & energy needs via most appropriate route for nutrition

## 2023-10-17 NOTE — DIETITIAN INITIAL EVALUATION ADULT - OTHER INFO
49 yo F with PMHx of HTN, HLD and anxiety and PSHx of remote appendectomy who presented with acute onset abdominal pain and found to be tachycardic, hypotensive, temp to 100.3, leukocytosis to 13, with lactate on 7 suggestive of sepsis of unknown origine. Patient admitted to SICU 10/16 for hemodynamic monitoring. Patient remains tachycardic, however MAPs >65 and a-febrile. Exam noteable for persistent abd distention and tenderness. WBC resolved and lactate trend 7 -> 5.6 -> 4.3 -> 4.3 most recently. Patient also with resolving mild transaminitis and elevated Tbili (i/s/o known alcohol use disorder). CT A/P 10/17 suggestive of enteritis without other acute pathologic process. Patient evaluated by GYN with no concern for acute GYN process.    Chart reviewed. Pt seen at bedside on 5 EA, on room air. Currently NPO. Pt confirms NKFA, no reported cultural/ethnic/Confucianism food preferences. Pt denies recent changes to appetite or PO intake. No recent weight changes noted. Pt appears well-nourished, no overt muscular wasting/subcutaneous losses. +abd distention, diarrhea noted 10/16. Labs reviewed- PT & INR elevated. Hgb A1c 5.9% [10/17], bicarb 18 <L>, lactate 4.3 <H>, total bilirubin 1.7 <H>, AST 53 <H>.  Meds reviewed- on IV thiamine & folic acid. RDN will continue to monitor, reassess, and intervene as appropriate.     Pain: no pain/discomfort noted  Skin: no pressure injuries noted. Kamron score 21  GI: +distention. pt endorses abd pain today, enteritis on CT scan

## 2023-10-17 NOTE — PROGRESS NOTE ADULT - ASSESSMENT
47 yo F with PMHx of HTN, HLD and anxiety and PSHx of remote appendectomy who presented with acute onset abdominal pain and found to be tachycardic, hypotensive, temp to 100.3, leukocytosis to 13, with lactate on 7 suggestive of sepsis of unknown origine. Patient admitted to SICU 10/16 for hemodynamic monitoring. Patient remains tachycardic, however MAPs >65 and a-febrile. Exam noteable for persistent abd distention and tenderness. WBC resolved and lactate trend 7 -> 5.6 -> 4.3 -> 4.3 most recently. Patient also with resolving mild transaminitis and elevated Tbili (i/s/o known alcohol use disorder). CT A/P 10/17 suggestive of enteritis without other acute pathologic process. Patient evaluated by GYN with no concern for acute GYN process.     Neuro: A&Ox3. Pain control with IV Tylenol PRN. Nausea control with Zofran PRN. Hx of anxiety - Hold home Seroquel, Vivance, can give Ativan .5 mg PRN anxiety.    HEENT: No concerns  CV: Goal maintain MAP > 65. s/4 3 L IVF resuscitation. Not currently requiring pressors. Presumed sepsis 2/2 intraabdominal source. Hx of HTN - holding lisinopril. Hx of HLD - holding atorvastatin. POCUS with IVC diameter >2cm. Continue to trend lactate: 7 -> 5.6 -> 4.3 -> 4.3.  Pulm: Goal maintain saturation > 94%. Saturating well on RA. POCUS 10/16 note-able for B-lines in R lung field  GI: Enteritis on CT scan. Hepatitis panel negative. Lipase WNL. Maintain NPO, PPI. Serial abdominal exams q4. Hx Alcohol use disorder, continue thiamine and folate. Monitor for s/s withdrawal. F/u Stool O&P, GI PCR, C-Diff. F/u RUQ US. Tbili 2.5 -> 1.7 (indirect), with AST/ALT improving. Consider outpatient GI follow-up.   : Voiding  ID: Zosyn (10/16 - ) for presumed intraabdominal sepsis. De-escalate pending results of BCx x2 10/16.   Heme: SCDs, Lovenox QD  Wounds/Drains/Lines: PIVs  PT/OT: Deferred  Dispo: SICU     47 yo F with PMHx of HTN, HLD and anxiety and PSHx of remote appendectomy who presented with acute onset abdominal pain and found to be tachycardic, hypotensive, temp to 100.3, leukocytosis to 13, with lactate on 7 suggestive of sepsis of unknown origine. Patient admitted to SICU 10/16 for hemodynamic monitoring. Patient remains tachycardic, however MAPs >65 and a-febrile. Exam noteable for persistent abd distention and tenderness. WBC resolved and lactate trend 7 -> 5.6 -> 4.3 -> 4.3 most recently. Patient also with resolving mild transaminitis and elevated Tbili (i/s/o known alcohol use disorder). CT A/P 10/17 suggestive of enteritis without other acute pathologic process. Patient evaluated by GYN with no concern for acute GYN process.     Neuro: A&Ox3. Pain control with IV Tylenol PRN. Nausea control with Zofran PRN. Hx of anxiety - Hold home Seroquel, Vivance, can give Ativan .5 mg PRN anxiety.    HEENT: No concerns  CV: Goal maintain MAP > 65. s/4 3 L IVF resuscitation. Not currently requiring pressors. Presumed sepsis 2/2 intraabdominal source. Hx of HTN - holding lisinopril. Hx of HLD - holding atorvastatin. POCUS with IVC diameter >2cm. Continue to trend lactate: 7 -> 5.6 -> 4.3 -> 4.3.  Pulm: Goal maintain saturation > 94%. Saturating well on RA. POCUS 10/16 note-able for B-lines in R lung field  GI: Enteritis on CT scan. Hepatitis panel negative. Lipase WNL. Maintain NPO, PPI. Serial abdominal exams q4. Hx Alcohol use disorder, continue thiamine and folate. Monitor for s/s withdrawal. F/u Stool O&P, GI PCR, C-Diff. F/u RUQ US. Tbili 2.5 -> 1.7 (indirect), with AST/ALT improving. Consider outpatient GI follow-up.   : Voiding  GYN: s/p GYN consult 10/16. No CMT. Per patient, IUD placement confirmed with in office US 10/16.   ID: Zosyn (10/16 - ) for presumed intraabdominal sepsis. De-escalate pending results of BCx x2 10/16.   Heme: SCDs, Lovenox QD  Wounds/Drains/Lines: PIVs  PT/OT: Deferred  Dispo: SICU     49 yo F with PMHx of HTN, HLD and anxiety and PSHx of remote appendectomy who presented with acute onset abdominal pain and found to be tachycardic, hypotensive, temp to 100.3, leukocytosis to 13, with lactate on 7 suggestive of sepsis of unknown origine. Patient admitted to SICU 10/16 for hemodynamic monitoring. Patient remains tachycardic, however MAPs >65 and a-febrile. Exam noteable for persistent abd distention and tenderness. WBC resolved and lactate trend 7 -> 5.6 -> 4.3 -> 4.3 most recently. Patient also with resolving mild transaminitis and elevated Tbili (i/s/o known alcohol use disorder). CT A/P 10/17 suggestive of enteritis without other acute pathologic process. Patient evaluated by GYN with no concern for acute GYN process.     Neuro: A&Ox3. Pain control with IV Tylenol PRN. Nausea control with Zofran PRN. Hx of anxiety - Hold home Seroquel, Vivance, can give Ativan .5 mg PRN anxiety.    HEENT: No concerns  CV: Goal maintain MAP > 65. s/4 3 L IVF resuscitation. Not currently requiring pressors. Presumed sepsis 2/2 intraabdominal source. Hx of HTN - holding lisinopril. Hx of HLD - holding atorvastatin. POCUS 10/16 with IVC diameter >2cm. Continue to trend lactate: 7 -> 5.6 -> 4.3 -> 4.3.  Pulm: Goal maintain saturation > 94%. Saturating well on RA. POCUS 10/16 note-able for B-lines in R lung field. Consider CXR.  GI: Enteritis on CT scan. Hepatitis panel negative. Lipase WNL. Maintain NPO, PPI. Serial abdominal exams q4. Hx Alcohol use disorder, continue thiamine and folate. Monitor for s/s withdrawal. F/u Stool O&P, GI PCR, C-Diff. F/u RUQ US. Tbili 2.5 -> 1.7 (indirect), with AST/ALT improving. Consider outpatient GI follow-up.   : Voiding. Consider UA.   GYN: s/p GYN consult 10/16. No CMT. Per patient, IUD placement confirmed with in office US 10/16.   ID: Zosyn (10/16 - ) for presumed intraabdominal sepsis. De-escalate pending results of BCx x2 10/16.   Heme: SCDs, Lovenox QD  Wounds/Drains/Lines: PIVs  PT/OT: Deferred  Dispo: SICU     49 yo F with PMHx of HTN, HLD and anxiety and PSHx of remote appendectomy who presented with acute onset abdominal pain and found to be tachycardic, hypotensive, temp to 100.3, leukocytosis to 13, with lactate on 7 suggestive of sepsis of unknown origine. Patient admitted to SICU 10/16 for hemodynamic monitoring. Patient remains tachycardic, however MAPs >65 and a-febrile. Exam noteable for persistent abd distention and tenderness. WBC resolved and lactate trend 7 -> 5.6 -> 4.3 -> 4.3 most recently. Patient also with resolving mild transaminitis and elevated Tbili (i/s/o known alcohol use disorder). CT A/P 10/17 suggestive of enteritis without other acute pathologic process. Patient evaluated by GYN with no concern for acute GYN process.     Neuro: A&Ox3. Pain control with IV Tylenol PRN. Nausea control with Zofran PRN. Hx of anxiety - Hold home Seroquel, Vivance, can give Ativan .5 mg PRN anxiety.    HEENT: No concerns  CV: Goal maintain MAP > 65. s/p 3 L IVF resuscitation. Not currently requiring pressors. Presumed sepsis 2/2 intraabdominal source. Hx of HTN - holding lisinopril. Hx of HLD - holding atorvastatin. POCUS 10/16 with IVC diameter >2cm. Continue to trend lactate: 7 -> 5.6 -> 4.3 -> 4.3.  Pulm: Goal maintain saturation > 94%. Saturating well on RA. POCUS 10/16 note-able for B-lines in R lung field. Consider CXR.  GI: Enteritis on CT scan. Hepatitis panel negative. Lipase WNL. Maintain NPO, PPI. Serial abdominal exams q4. Hx Alcohol use disorder, continue thiamine and folate. Monitor for s/s withdrawal. F/u Stool O&P, GI PCR, C-Diff. F/u RUQ US. Tbili 2.5 -> 1.7 (indirect), with AST/ALT improving. Consider outpatient GI follow-up.   : Voiding. Consider UCx.   GYN: s/p GYN consult 10/16. No CMT. Per patient, IUD placement confirmed with in office US 10/16.   ID: Zosyn (10/16 - ) for presumed intraabdominal sepsis. De-escalate pending results of BCx x2 10/16.   Heme: SCDs, Lovenox QD  Wounds/Drains/Lines: PIVs  PT/OT: Deferred  Dispo: SICU     49 yo F with PMHx of HTN, HLD and anxiety and PSHx of remote appendectomy who presented with acute onset abdominal pain and found to be tachycardic, hypotensive, temp to 100.3, leukocytosis to 13, with lactate on 7 suggestive of sepsis of unknown origine. Patient admitted to SICU 10/16 for hemodynamic monitoring. Patient remains tachycardic, however MAPs >65 and a-febrile. Exam noteable for persistent abd distention and tenderness. WBC resolved and lactate trend 7 -> 5.6 -> 4.3 -> 4.3 most recently. Patient also with resolving mild transaminitis and elevated Tbili (i/s/o known alcohol use disorder). CT A/P 10/17 suggestive of enteritis without other acute pathologic process. Patient evaluated by GYN with no concern for acute GYN process.     Neuro: A&Ox3. Pain control with IV Tylenol PRN. Nausea control with Zofran PRN. Hx of anxiety - Hold home Seroquel, Vivance, can give Ativan .5 mg PRN anxiety.    HEENT: No concerns  CV: Goal maintain MAP > 65. s/p 3 L IVF resuscitation. Not currently requiring pressors. Presumed sepsis 2/2 intraabdominal source. Hx of HTN - holding lisinopril. Hx of HLD - holding atorvastatin. Continue to trend lactate: 7 -> 5.6 -> 4.3 -> 4.3.  Pulm: Goal maintain saturation > 94%. Saturating well on RA. POCUS 10/17 note-able for B-lines in R lung field anterior/posterior, clear left lung. Consider CXR.  GI: Enteritis on CT scan. Hepatitis panel negative. Lipase WNL. Maintain NPO, PPI. Serial abdominal exams q4. Hx Alcohol use disorder, continue thiamine and folate. Monitor for s/s withdrawal. F/u Stool O&P, GI PCR, C-Diff. F/u RUQ US. Tbili 2.5 -> 1.7 (indirect), with AST/ALT improving. Consider outpatient GI follow-up.   : Voiding. Consider UCx.   GYN: s/p GYN consult 10/16. No CMT. Per patient, IUD placement confirmed with in office US 10/16.   ID: Zosyn (10/16 - ) for presumed intraabdominal sepsis. De-escalate pending results of BCx x2 10/16.   Heme: SCDs, Lovenox QD  Wounds/Drains/Lines: PIVs  PT/OT: Deferred  Dispo: SICU     47 yo F with PMHx of HTN, HLD and anxiety and PSHx of remote appendectomy who presented with acute onset abdominal pain and found to be tachycardic, hypotensive, temp to 100.3, leukocytosis to 13, with lactate on 7 suggestive of sepsis of unknown origine. Patient admitted to SICU 10/16 for hemodynamic monitoring. Patient remains tachycardic, however MAPs >65 and a-febrile. Exam noteable for persistent abd distention and tenderness. WBC resolved and lactate trend 7 -> 5.6 -> 4.3 -> 4.3 most recently. Patient also with resolving mild transaminitis and elevated Tbili (i/s/o known alcohol use disorder). CT A/P 10/17 suggestive of enteritis without other acute pathologic process. Patient evaluated by GYN with no concern for acute GYN process.     Neuro: A&Ox3. Pain control with IV Tylenol PRN. Nausea control with Zofran PRN. Hx of anxiety - Hold home Seroquel, Vivance, can give Ativan .5 mg PRN anxiety.    HEENT: No concerns  CV: Goal maintain MAP > 65. s/p 3 L IVF resuscitation. Not currently requiring pressors. Presumed sepsis 2/2 intraabdominal source. Hx of HTN - holding lisinopril. Hx of HLD - holding atorvastatin. Continue to trend lactate: 7 -> 5.6 -> 4.3 -> 4.3. Continue fluid resuscitation  Pulm: Goal maintain saturation > 94%. Saturating well on RA. POCUS 10/17 with predominantly A-line. Consider CXR.  GI: Enteritis on CT scan. Hepatitis panel negative. Lipase WNL. Maintain NPO, PPI. Serial abdominal exams q4. Hx Alcohol use disorder, continue thiamine and folate. Monitor for s/s withdrawal. F/u Stool O&P, GI PCR, C-Diff. F/u RUQ US. Tbili 2.5 -> 1.7 (indirect), with AST/ALT improving. Consider outpatient GI follow-up.   : Voiding. hx UTI 2 weeks ago s/p 1 week nitrofurantoin, 1 week ciprofloxacin 10/13.  GYN: s/p GYN consult 10/16. No CMT. Per patient, IUD placement confirmed with in office US 10/16.   ID: Zosyn (10/16 - ) for presumed intraabdominal sepsis. De-escalate pending results of BCx x2 10/16.   Heme: SCDs, Lovenox QD  Wounds/Drains/Lines: PIVs  PT/OT: Deferred  Dispo: SICU

## 2023-10-17 NOTE — PROGRESS NOTE ADULT - ASSESSMENT
49 yo F with PMHx of HTN, HLD and anxiety and PSHx of remote appendectomy who presented with acute onset abdominal pain and found to be tachycardic, hypotensive, temp to 100.3, leukocytosis to 13, with lactate on 7 suggestive of sepsis of unknown origine. Patient admitted to SICU 10/16 for hemodynamic monitoring. Patient remains tachycardic, however MAPs >65 and a-febrile. Exam noteable for persistent abd distention and tenderness. WBC resolved and lactate trend 7 -> 5.6 -> 4.3 -> 4.3 most recently. Patient also with resolving mild transaminitis and elevated Tbili (i/s/o known alcohol use disorder). CT A/P 10/17 suggestive of enteritis without other acute pathologic process. Patient evaluated by GYN with no concern for acute GYN process.     Plan:  - F/u RUQ US  - F/u GI PCR, stool ova and parasite, c diff  Plan discussed with chief resident and attending, Dr. Bradley

## 2023-10-17 NOTE — PROGRESS NOTE ADULT - SUBJECTIVE AND OBJECTIVE BOX
ON:       SUBJECTIVE:    MEDICATIONS  (STANDING):  chlorhexidine 4% Liquid 1 Application(s) Topical <User Schedule>  enoxaparin Injectable 40 milliGRAM(s) SubCutaneous every 24 hours  folic acid Injectable 1 milliGRAM(s) IV Push daily  insulin lispro (ADMELOG) corrective regimen sliding scale   SubCutaneous every 6 hours  pantoprazole  Injectable 40 milliGRAM(s) IV Push daily  piperacillin/tazobactam IVPB.. 3.375 Gram(s) IV Intermittent every 8 hours  thiamine Injectable 100 milliGRAM(s) IV Push daily    MEDICATIONS  (PRN):  LORazepam   Injectable 0.5 milliGRAM(s) IV Push every 4 hours PRN Anxiety      Drips:     ICU Vital Signs Last 24 Hrs  T(C): 37 (17 Oct 2023 05:00), Max: 37.9 (16 Oct 2023 17:12)  T(F): 98.6 (17 Oct 2023 05:00), Max: 100.3 (16 Oct 2023 17:12)  HR: 111 (17 Oct 2023 06:00) (105 - 128)  BP: 120/82 (17 Oct 2023 06:00) (81/50 - 124/88)  BP(mean): 96 (17 Oct 2023 06:00) (65 - 101)  ABP: --  ABP(mean): --  RR: 24 (17 Oct 2023 06:00) (16 - 34)  SpO2: 96% (17 Oct 2023 06:00) (94% - 99%)    O2 Parameters below as of 17 Oct 2023 01:00  Patient On (Oxygen Delivery Method): room air            Physical Exam:  General: NAD  HEENT: NC/AT, EOMI, PERRLA, normal hearing, no oral lesions, neck supple w/o LAD  Pulmonary: Nonlabored breathing, no respiratory distress, CTA-B  Cardiovascular: NSR, no murmurs  Abdominal: soft, NT/ND, +BS, no organomegaly  Extremities: WWP, 5/5 strength x 4, no clubbing/cyanosis/edema  Neuro: A/O x3, CNs II-XII grossly intact, normal motor/sensation, no focal deficits  Pulses: palpable distal pulses    Lines/tubes/drains:  Murphy:	      Vent settings:      I&O's Summary    16 Oct 2023 07:01  -  17 Oct 2023 07:00  --------------------------------------------------------  IN: 2150 mL / OUT: 550 mL / NET: 1600 mL        LABS:                        10.8   12.56 )-----------( 211      ( 17 Oct 2023 05:30 )             32.8     10-17    138  |  106  |  17  ----------------------------<  114<H>  4.1   |  18<L>  |  0.84    Ca    8.4      17 Oct 2023 05:30  Phos  3.6     10-17  Mg     1.9     10-17    TPro  6.2  /  Alb  3.3  /  TBili  1.7<H>  /  DBili  x   /  AST  53<H>  /  ALT  42  /  AlkPhos  98  10-17    PT/INR - ( 16 Oct 2023 20:47 )   PT: 15.0 sec;   INR: 1.33          PTT - ( 16 Oct 2023 20:47 )  PTT:31.2 sec  Urinalysis Basic - ( 17 Oct 2023 05:30 )    Color: x / Appearance: x / SG: x / pH: x  Gluc: 114 mg/dL / Ketone: x  / Bili: x / Urobili: x   Blood: x / Protein: x / Nitrite: x   Leuk Esterase: x / RBC: x / WBC x   Sq Epi: x / Non Sq Epi: x / Bacteria: x      CAPILLARY BLOOD GLUCOSE      POCT Blood Glucose.: 114 mg/dL (17 Oct 2023 00:05)    LIVER FUNCTIONS - ( 17 Oct 2023 01:42 )  Alb: 3.3 g/dL / Pro: 6.2 g/dL / ALK PHOS: 98 U/L / ALT: 42 U/L / AST: 53 U/L / GGT: x             Cultures:    RADIOLOGY & ADDITIONAL STUDIES:     ON: Patient admitted to SICU for sepsis of unknown origin. Utox + for amphetamine (vyvanese), opioid (received morphine in ED), and marijuana (recreation). POCUS with significant B-lines in b/l lung bases and IVC without resp variability -> held IVF. Repeat lactate 5.6 -> 4.3 -> 4.3. T    SUBJECTIVE: Patient reports feeling ****.     MEDICATIONS  (STANDING):  chlorhexidine 4% Liquid 1 Application(s) Topical <User Schedule>  enoxaparin Injectable 40 milliGRAM(s) SubCutaneous every 24 hours  folic acid Injectable 1 milliGRAM(s) IV Push daily  insulin lispro (ADMELOG) corrective regimen sliding scale   SubCutaneous every 6 hours  pantoprazole  Injectable 40 milliGRAM(s) IV Push daily  piperacillin/tazobactam IVPB.. 3.375 Gram(s) IV Intermittent every 8 hours  thiamine Injectable 100 milliGRAM(s) IV Push daily    MEDICATIONS  (PRN):  LORazepam   Injectable 0.5 milliGRAM(s) IV Push every 4 hours PRN Anxiety    ICU Vital Signs Last 24 Hrs  T(C): 37 (17 Oct 2023 05:00), Max: 37.9 (16 Oct 2023 17:12)  T(F): 98.6 (17 Oct 2023 05:00), Max: 100.3 (16 Oct 2023 17:12)  HR: 111 (17 Oct 2023 06:00) (105 - 128)  BP: 120/82 (17 Oct 2023 06:00) (81/50 - 124/88)  BP(mean): 96 (17 Oct 2023 06:00) (65 - 101)  ABP: --  ABP(mean): --  RR: 24 (17 Oct 2023 06:00) (16 - 34)  SpO2: 96% (17 Oct 2023 06:00) (94% - 99%)    O2 Parameters below as of 17 Oct 2023 01:00  Patient On (Oxygen Delivery Method): room air    I&O's Summary    16 Oct 2023 07:01  -  17 Oct 2023 07:00  --------------------------------------------------------  IN: 2150 mL / OUT: 550 mL / NET: 1600 mL    Physical Exam:  General: NAD  HEENT: NC/AT, EOMI, PERRLA, normal hearing, no oral lesions, neck supple w/o LAD  Pulmonary: Nonlabored breathing, no respiratory distress, CTA-B  Cardiovascular: NSR, no murmurs  Abdominal: soft, mildly distended, mildly diffusely tender throughout, +BSx4  Extremities: WWP, no pitting edema  Neuro: A/O x3, CNs II-XII grossly intact, no focal deficits  Pulses: palpable distal pulses      LABS:                        10.8   12.56 )-----------( 211      ( 17 Oct 2023 05:30 )             32.8     10-17    138  |  106  |  17  ----------------------------<  114<H>  4.1   |  18<L>  |  0.84    Ca    8.4      17 Oct 2023 05:30  Phos  3.6     10-17  Mg     1.9     10-17    TPro  6.2  /  Alb  3.3  /  TBili  1.7<H>  /  DBili  x   /  AST  53<H>  /  ALT  42  /  AlkPhos  98  10-17    PT/INR - ( 16 Oct 2023 20:47 )   PT: 15.0 sec;   INR: 1.33          PTT - ( 16 Oct 2023 20:47 )  PTT:31.2 sec  Urinalysis Basic - ( 17 Oct 2023 05:30 )    Color: x / Appearance: x / SG: x / pH: x  Gluc: 114 mg/dL / Ketone: x  / Bili: x / Urobili: x   Blood: x / Protein: x / Nitrite: x   Leuk Esterase: x / RBC: x / WBC x   Sq Epi: x / Non Sq Epi: x / Bacteria: x      CAPILLARY BLOOD GLUCOSE      POCT Blood Glucose.: 114 mg/dL (17 Oct 2023 00:05)    LIVER FUNCTIONS - ( 17 Oct 2023 01:42 )  Alb: 3.3 g/dL / Pro: 6.2 g/dL / ALK PHOS: 98 U/L / ALT: 42 U/L / AST: 53 U/L / GGT: x             RADIOLOGY & ADDITIONAL STUDIES:    < from: CT Abdomen and Pelvis w/ Oral Cont and w/ IV Cont (10.16.23 @ 19:04) >  IMPRESSION: Findings are suggestive of enteritis    < end of copied text >   ON: Patient admitted to SICU for sepsis of unknown origin. Utox + for amphetamine (vyvanese), opioid (received morphine in ED), and marijuana (recreation). POCUS with significant B-lines in b/l lung bases and IVC without resp variability -> held IVF. Repeat lactate 5.6 -> 4.3 -> 4.3.     SUBJECTIVE: Patient reports feeling overall better. She denies any abdominal pain at rest, only with palpation. She had one loose stool overnight. Denies nausea/vomiting/fevers/chills/SOB/chest pain.     MEDICATIONS  (STANDING):  chlorhexidine 4% Liquid 1 Application(s) Topical <User Schedule>  enoxaparin Injectable 40 milliGRAM(s) SubCutaneous every 24 hours  folic acid Injectable 1 milliGRAM(s) IV Push daily  insulin lispro (ADMELOG) corrective regimen sliding scale   SubCutaneous every 6 hours  pantoprazole  Injectable 40 milliGRAM(s) IV Push daily  piperacillin/tazobactam IVPB.. 3.375 Gram(s) IV Intermittent every 8 hours  thiamine Injectable 100 milliGRAM(s) IV Push daily    MEDICATIONS  (PRN):  LORazepam   Injectable 0.5 milliGRAM(s) IV Push every 4 hours PRN Anxiety    ICU Vital Signs Last 24 Hrs  T(C): 37 (17 Oct 2023 05:00), Max: 37.9 (16 Oct 2023 17:12)  T(F): 98.6 (17 Oct 2023 05:00), Max: 100.3 (16 Oct 2023 17:12)  HR: 111 (17 Oct 2023 06:00) (105 - 128)  BP: 120/82 (17 Oct 2023 06:00) (81/50 - 124/88)  BP(mean): 96 (17 Oct 2023 06:00) (65 - 101)  ABP: --  ABP(mean): --  RR: 24 (17 Oct 2023 06:00) (16 - 34)  SpO2: 96% (17 Oct 2023 06:00) (94% - 99%)    O2 Parameters below as of 17 Oct 2023 01:00  Patient On (Oxygen Delivery Method): room air    I&O's Summary    16 Oct 2023 07:01  -  17 Oct 2023 07:00  --------------------------------------------------------  IN: 2150 mL / OUT: 550 mL / NET: 1600 mL    Physical Exam:  General: NAD  HEENT: NC/AT, EOMI, PERRLA, normal hearing, no oral lesions, neck supple w/o LAD  Pulmonary: Nonlabored breathing, no respiratory distress, CTA-B  Cardiovascular: NSR, no murmurs  Abdominal: soft, moderately distended, mildly diffusely tender throughout, +BSx4  Extremities: WWP, no pitting edema  Neuro: A/O x3, CNs II-XII grossly intact, no focal deficits  Pulses: palpable distal pulses      LABS:                        10.8   12.56 )-----------( 211      ( 17 Oct 2023 05:30 )             32.8     10-17    138  |  106  |  17  ----------------------------<  114<H>  4.1   |  18<L>  |  0.84    Ca    8.4      17 Oct 2023 05:30  Phos  3.6     10-17  Mg     1.9     10-17    TPro  6.2  /  Alb  3.3  /  TBili  1.7<H>  /  DBili  x   /  AST  53<H>  /  ALT  42  /  AlkPhos  98  10-17    PT/INR - ( 16 Oct 2023 20:47 )   PT: 15.0 sec;   INR: 1.33          PTT - ( 16 Oct 2023 20:47 )  PTT:31.2 sec  Urinalysis Basic - ( 17 Oct 2023 05:30 )    Color: x / Appearance: x / SG: x / pH: x  Gluc: 114 mg/dL / Ketone: x  / Bili: x / Urobili: x   Blood: x / Protein: x / Nitrite: x   Leuk Esterase: x / RBC: x / WBC x   Sq Epi: x / Non Sq Epi: x / Bacteria: x      CAPILLARY BLOOD GLUCOSE      POCT Blood Glucose.: 114 mg/dL (17 Oct 2023 00:05)    LIVER FUNCTIONS - ( 17 Oct 2023 01:42 )  Alb: 3.3 g/dL / Pro: 6.2 g/dL / ALK PHOS: 98 U/L / ALT: 42 U/L / AST: 53 U/L / GGT: x             RADIOLOGY & ADDITIONAL STUDIES:    < from: CT Abdomen and Pelvis w/ Oral Cont and w/ IV Cont (10.16.23 @ 19:04) >  IMPRESSION: Findings are suggestive of enteritis    < end of copied text >   ON: Patient admitted to SICU for sepsis of unknown origin. Utox + for amphetamine (vyvanese), opioid (received morphine in ED), and marijuana (recreation). POCUS with significant B-lines in b/l lung bases and IVC without resp variability -> held IVF. Repeat lactate 5.6 -> 4.3 -> 4.3.     SUBJECTIVE: Patient reports feeling overall better. She denies any abdominal pain at rest, only with palpation. She had one loose stool overnight, not bloody, not liquid. +Passing Gas. Denies nausea/vomiting/fevers/chills/SOB/chest pain.     MEDICATIONS  (STANDING):  chlorhexidine 4% Liquid 1 Application(s) Topical <User Schedule>  enoxaparin Injectable 40 milliGRAM(s) SubCutaneous every 24 hours  folic acid Injectable 1 milliGRAM(s) IV Push daily  insulin lispro (ADMELOG) corrective regimen sliding scale   SubCutaneous every 6 hours  pantoprazole  Injectable 40 milliGRAM(s) IV Push daily  piperacillin/tazobactam IVPB.. 3.375 Gram(s) IV Intermittent every 8 hours  thiamine Injectable 100 milliGRAM(s) IV Push daily    MEDICATIONS  (PRN):  LORazepam   Injectable 0.5 milliGRAM(s) IV Push every 4 hours PRN Anxiety    ICU Vital Signs Last 24 Hrs  T(C): 37 (17 Oct 2023 05:00), Max: 37.9 (16 Oct 2023 17:12)  T(F): 98.6 (17 Oct 2023 05:00), Max: 100.3 (16 Oct 2023 17:12)  HR: 111 (17 Oct 2023 06:00) (105 - 128)  BP: 120/82 (17 Oct 2023 06:00) (81/50 - 124/88)  BP(mean): 96 (17 Oct 2023 06:00) (65 - 101)  ABP: --  ABP(mean): --  RR: 24 (17 Oct 2023 06:00) (16 - 34)  SpO2: 96% (17 Oct 2023 06:00) (94% - 99%)    O2 Parameters below as of 17 Oct 2023 01:00  Patient On (Oxygen Delivery Method): room air    I&O's Summary    16 Oct 2023 07:01  -  17 Oct 2023 07:00  --------------------------------------------------------  IN: 2150 mL / OUT: 550 mL / NET: 1600 mL    Physical Exam:  General: NAD  HEENT: NC/AT, EOMI, PERRLA, normal hearing, no oral lesions, neck supple w/o LAD  Pulmonary: Nonlabored breathing, no respiratory distress, CTA-B  Cardiovascular: NSR, no murmurs  Abdominal: soft, moderately distended, mildly diffusely tender throughout, +BSx4  Extremities: WWP, no pitting edema  Neuro: A/O x3, CNs II-XII grossly intact, no focal deficits  Pulses: palpable distal pulses      LABS:                        10.8   12.56 )-----------( 211      ( 17 Oct 2023 05:30 )             32.8     10-17    138  |  106  |  17  ----------------------------<  114<H>  4.1   |  18<L>  |  0.84    Ca    8.4      17 Oct 2023 05:30  Phos  3.6     10-17  Mg     1.9     10-17    TPro  6.2  /  Alb  3.3  /  TBili  1.7<H>  /  DBili  x   /  AST  53<H>  /  ALT  42  /  AlkPhos  98  10-17    PT/INR - ( 16 Oct 2023 20:47 )   PT: 15.0 sec;   INR: 1.33          PTT - ( 16 Oct 2023 20:47 )  PTT:31.2 sec  Urinalysis Basic - ( 17 Oct 2023 05:30 )    Color: x / Appearance: x / SG: x / pH: x  Gluc: 114 mg/dL / Ketone: x  / Bili: x / Urobili: x   Blood: x / Protein: x / Nitrite: x   Leuk Esterase: x / RBC: x / WBC x   Sq Epi: x / Non Sq Epi: x / Bacteria: x      CAPILLARY BLOOD GLUCOSE      POCT Blood Glucose.: 114 mg/dL (17 Oct 2023 00:05)    LIVER FUNCTIONS - ( 17 Oct 2023 01:42 )  Alb: 3.3 g/dL / Pro: 6.2 g/dL / ALK PHOS: 98 U/L / ALT: 42 U/L / AST: 53 U/L / GGT: x             RADIOLOGY & ADDITIONAL STUDIES:    < from: CT Abdomen and Pelvis w/ Oral Cont and w/ IV Cont (10.16.23 @ 19:04) >  IMPRESSION: Findings are suggestive of enteritis    < end of copied text >   ON: Patient admitted to SICU for sepsis of unknown origin. Utox + for amphetamine (vyvanese), opioid (received morphine in ED), and marijuana (recreation). POCUS with significant B-lines in b/l lung bases and IVC without resp variability -> held IVF. Repeat lactate 5.6 -> 4.3 -> 4.3.     SUBJECTIVE: Patient reports feeling overall better. She denies any abdominal pain at rest, only with palpation. She had one loose stool overnight, not bloody, not liquid. +Passing Gas. Denies nausea/vomiting/fevers/chills/SOB/chest pain.     MEDICATIONS  (STANDING):  chlorhexidine 4% Liquid 1 Application(s) Topical <User Schedule>  enoxaparin Injectable 40 milliGRAM(s) SubCutaneous every 24 hours  folic acid Injectable 1 milliGRAM(s) IV Push daily  insulin lispro (ADMELOG) corrective regimen sliding scale   SubCutaneous every 6 hours  pantoprazole  Injectable 40 milliGRAM(s) IV Push daily  piperacillin/tazobactam IVPB.. 3.375 Gram(s) IV Intermittent every 8 hours  thiamine Injectable 100 milliGRAM(s) IV Push daily    MEDICATIONS  (PRN):  LORazepam   Injectable 0.5 milliGRAM(s) IV Push every 4 hours PRN Anxiety    ICU Vital Signs Last 24 Hrs  T(C): 37 (17 Oct 2023 05:00), Max: 37.9 (16 Oct 2023 17:12)  T(F): 98.6 (17 Oct 2023 05:00), Max: 100.3 (16 Oct 2023 17:12)  HR: 111 (17 Oct 2023 06:00) (105 - 128)  BP: 120/82 (17 Oct 2023 06:00) (81/50 - 124/88)  BP(mean): 96 (17 Oct 2023 06:00) (65 - 101)  ABP: --  ABP(mean): --  RR: 24 (17 Oct 2023 06:00) (16 - 34)  SpO2: 96% (17 Oct 2023 06:00) (94% - 99%)    O2 Parameters below as of 17 Oct 2023 01:00  Patient On (Oxygen Delivery Method): room air    I&O's Summary    16 Oct 2023 07:01  -  17 Oct 2023 07:00  --------------------------------------------------------  IN: 2150 mL / OUT: 550 mL / NET: 1600 mL    Physical Exam:  General: NAD  HEENT: NC/AT, EOMI, PERRLA, normal hearing, no oral lesions, neck supple w/o LAD  Pulmonary: Nonlabored breathing, no respiratory distress, CTA-B  Cardiovascular: NSR, no murmurs  Abdominal: soft, moderately distended, +tympanic upper abdomen, mildly diffusely tender throughout, +BSx4  Extremities: WWP, no pitting edema  Neuro: A/O x3, CNs II-XII grossly intact, no focal deficits  Pulses: palpable distal pulses      LABS:                        10.8   12.56 )-----------( 211      ( 17 Oct 2023 05:30 )             32.8     10-17    138  |  106  |  17  ----------------------------<  114<H>  4.1   |  18<L>  |  0.84    Ca    8.4      17 Oct 2023 05:30  Phos  3.6     10-17  Mg     1.9     10-17    TPro  6.2  /  Alb  3.3  /  TBili  1.7<H>  /  DBili  x   /  AST  53<H>  /  ALT  42  /  AlkPhos  98  10-17    PT/INR - ( 16 Oct 2023 20:47 )   PT: 15.0 sec;   INR: 1.33          PTT - ( 16 Oct 2023 20:47 )  PTT:31.2 sec  Urinalysis Basic - ( 17 Oct 2023 05:30 )    Color: x / Appearance: x / SG: x / pH: x  Gluc: 114 mg/dL / Ketone: x  / Bili: x / Urobili: x   Blood: x / Protein: x / Nitrite: x   Leuk Esterase: x / RBC: x / WBC x   Sq Epi: x / Non Sq Epi: x / Bacteria: x      CAPILLARY BLOOD GLUCOSE      POCT Blood Glucose.: 114 mg/dL (17 Oct 2023 00:05)    LIVER FUNCTIONS - ( 17 Oct 2023 01:42 )  Alb: 3.3 g/dL / Pro: 6.2 g/dL / ALK PHOS: 98 U/L / ALT: 42 U/L / AST: 53 U/L / GGT: x             RADIOLOGY & ADDITIONAL STUDIES:    < from: CT Abdomen and Pelvis w/ Oral Cont and w/ IV Cont (10.16.23 @ 19:04) >  IMPRESSION: Findings are suggestive of enteritis    < end of copied text >   ON: Patient admitted to SICU for sepsis of unknown origin. Utox + for amphetamine (vyvanese), opioid (received morphine in ED), and marijuana (recreation). POCUS with significant B-lines in b/l lung bases and IVC without resp variability -> held IVF. Repeat lactate 5.6 -> 4.3 -> 4.3.     SUBJECTIVE: Patient reports feeling overall better. She denies any abdominal pain at rest, only with palpation. She had one loose stool overnight, not bloody, not liquid. +Passing Gas. Denies nausea/vomiting/fevers/chills/SOB/chest pain. Patient reports travel to [place] 2 weeks ago -> dx-ed with UTI -> treated with 1 week course of nitrofurantoin -> required 1 week course of Ciprofloxacin complete don 10/13. Reports episodes of diarrhea since then.    MEDICATIONS  (STANDING):  chlorhexidine 4% Liquid 1 Application(s) Topical <User Schedule>  enoxaparin Injectable 40 milliGRAM(s) SubCutaneous every 24 hours  folic acid Injectable 1 milliGRAM(s) IV Push daily  insulin lispro (ADMELOG) corrective regimen sliding scale   SubCutaneous every 6 hours  pantoprazole  Injectable 40 milliGRAM(s) IV Push daily  piperacillin/tazobactam IVPB.. 3.375 Gram(s) IV Intermittent every 8 hours  thiamine Injectable 100 milliGRAM(s) IV Push daily    MEDICATIONS  (PRN):  LORazepam   Injectable 0.5 milliGRAM(s) IV Push every 4 hours PRN Anxiety    ICU Vital Signs Last 24 Hrs  T(C): 37 (17 Oct 2023 05:00), Max: 37.9 (16 Oct 2023 17:12)  T(F): 98.6 (17 Oct 2023 05:00), Max: 100.3 (16 Oct 2023 17:12)  HR: 111 (17 Oct 2023 06:00) (105 - 128)  BP: 120/82 (17 Oct 2023 06:00) (81/50 - 124/88)  BP(mean): 96 (17 Oct 2023 06:00) (65 - 101)  ABP: --  ABP(mean): --  RR: 24 (17 Oct 2023 06:00) (16 - 34)  SpO2: 96% (17 Oct 2023 06:00) (94% - 99%)    O2 Parameters below as of 17 Oct 2023 01:00  Patient On (Oxygen Delivery Method): room air    I&O's Summary    16 Oct 2023 07:01  -  17 Oct 2023 07:00  --------------------------------------------------------  IN: 2150 mL / OUT: 550 mL / NET: 1600 mL    Physical Exam:  General: NAD  HEENT: NC/AT, EOMI, PERRLA, normal hearing, no oral lesions, neck supple w/o LAD  Pulmonary: Nonlabored breathing, no respiratory distress, CTA-B  Cardiovascular: NSR, no murmurs  Abdominal: soft, moderately distended, +tympanic upper abdomen, mildly diffusely tender throughout, +BSx4  Extremities: WWP, no pitting edema  Neuro: A/O x3, CNs II-XII grossly intact, no focal deficits  Pulses: palpable distal pulses      LABS:                        10.8   12.56 )-----------( 211      ( 17 Oct 2023 05:30 )             32.8     10-17    138  |  106  |  17  ----------------------------<  114<H>  4.1   |  18<L>  |  0.84    Ca    8.4      17 Oct 2023 05:30  Phos  3.6     10-17  Mg     1.9     10-17    TPro  6.2  /  Alb  3.3  /  TBili  1.7<H>  /  DBili  x   /  AST  53<H>  /  ALT  42  /  AlkPhos  98  10-17    PT/INR - ( 16 Oct 2023 20:47 )   PT: 15.0 sec;   INR: 1.33          PTT - ( 16 Oct 2023 20:47 )  PTT:31.2 sec  Urinalysis Basic - ( 17 Oct 2023 05:30 )    Color: x / Appearance: x / SG: x / pH: x  Gluc: 114 mg/dL / Ketone: x  / Bili: x / Urobili: x   Blood: x / Protein: x / Nitrite: x   Leuk Esterase: x / RBC: x / WBC x   Sq Epi: x / Non Sq Epi: x / Bacteria: x      CAPILLARY BLOOD GLUCOSE      POCT Blood Glucose.: 114 mg/dL (17 Oct 2023 00:05)    LIVER FUNCTIONS - ( 17 Oct 2023 01:42 )  Alb: 3.3 g/dL / Pro: 6.2 g/dL / ALK PHOS: 98 U/L / ALT: 42 U/L / AST: 53 U/L / GGT: x             RADIOLOGY & ADDITIONAL STUDIES:    < from: CT Abdomen and Pelvis w/ Oral Cont and w/ IV Cont (10.16.23 @ 19:04) >  IMPRESSION: Findings are suggestive of enteritis    < end of copied text >

## 2023-10-18 LAB
ALBUMIN SERPL ELPH-MCNC: 2.8 G/DL — LOW (ref 3.3–5)
ALBUMIN SERPL ELPH-MCNC: 2.8 G/DL — LOW (ref 3.3–5)
ALP SERPL-CCNC: 156 U/L — HIGH (ref 40–120)
ALP SERPL-CCNC: 156 U/L — HIGH (ref 40–120)
ALT FLD-CCNC: 26 U/L — SIGNIFICANT CHANGE UP (ref 10–45)
ALT FLD-CCNC: 26 U/L — SIGNIFICANT CHANGE UP (ref 10–45)
ANION GAP SERPL CALC-SCNC: 16 MMOL/L — SIGNIFICANT CHANGE UP (ref 5–17)
ANION GAP SERPL CALC-SCNC: 16 MMOL/L — SIGNIFICANT CHANGE UP (ref 5–17)
ANISOCYTOSIS BLD QL: SLIGHT — SIGNIFICANT CHANGE UP
ANISOCYTOSIS BLD QL: SLIGHT — SIGNIFICANT CHANGE UP
AST SERPL-CCNC: 31 U/L — SIGNIFICANT CHANGE UP (ref 10–40)
AST SERPL-CCNC: 31 U/L — SIGNIFICANT CHANGE UP (ref 10–40)
BASOPHILS # BLD AUTO: 0 K/UL — SIGNIFICANT CHANGE UP (ref 0–0.2)
BASOPHILS # BLD AUTO: 0 K/UL — SIGNIFICANT CHANGE UP (ref 0–0.2)
BASOPHILS NFR BLD AUTO: 0 % — SIGNIFICANT CHANGE UP (ref 0–2)
BASOPHILS NFR BLD AUTO: 0 % — SIGNIFICANT CHANGE UP (ref 0–2)
BILIRUB SERPL-MCNC: 0.8 MG/DL — SIGNIFICANT CHANGE UP (ref 0.2–1.2)
BILIRUB SERPL-MCNC: 0.8 MG/DL — SIGNIFICANT CHANGE UP (ref 0.2–1.2)
BUN SERPL-MCNC: 16 MG/DL — SIGNIFICANT CHANGE UP (ref 7–23)
BUN SERPL-MCNC: 16 MG/DL — SIGNIFICANT CHANGE UP (ref 7–23)
C DIFF GDH STL QL: NEGATIVE — SIGNIFICANT CHANGE UP
C DIFF GDH STL QL: NEGATIVE — SIGNIFICANT CHANGE UP
C DIFF GDH STL QL: SIGNIFICANT CHANGE UP
C DIFF GDH STL QL: SIGNIFICANT CHANGE UP
C TRACH RRNA SPEC QL NAA+PROBE: SIGNIFICANT CHANGE UP
C TRACH RRNA SPEC QL NAA+PROBE: SIGNIFICANT CHANGE UP
CALCIUM SERPL-MCNC: 9.1 MG/DL — SIGNIFICANT CHANGE UP (ref 8.4–10.5)
CALCIUM SERPL-MCNC: 9.1 MG/DL — SIGNIFICANT CHANGE UP (ref 8.4–10.5)
CHLORIDE SERPL-SCNC: 105 MMOL/L — SIGNIFICANT CHANGE UP (ref 96–108)
CHLORIDE SERPL-SCNC: 105 MMOL/L — SIGNIFICANT CHANGE UP (ref 96–108)
CO2 SERPL-SCNC: 16 MMOL/L — LOW (ref 22–31)
CO2 SERPL-SCNC: 16 MMOL/L — LOW (ref 22–31)
CORTIS AM PEAK SERPL-MCNC: 25.97 UG/DL — HIGH (ref 6.02–18.4)
CORTIS AM PEAK SERPL-MCNC: 25.97 UG/DL — HIGH (ref 6.02–18.4)
CREAT SERPL-MCNC: 0.74 MG/DL — SIGNIFICANT CHANGE UP (ref 0.5–1.3)
CREAT SERPL-MCNC: 0.74 MG/DL — SIGNIFICANT CHANGE UP (ref 0.5–1.3)
CULTURE RESULTS: SIGNIFICANT CHANGE UP
CULTURE RESULTS: SIGNIFICANT CHANGE UP
EGFR: 98 ML/MIN/1.73M2 — SIGNIFICANT CHANGE UP
EGFR: 98 ML/MIN/1.73M2 — SIGNIFICANT CHANGE UP
EOSINOPHIL # BLD AUTO: 0.27 K/UL — SIGNIFICANT CHANGE UP (ref 0–0.5)
EOSINOPHIL # BLD AUTO: 0.27 K/UL — SIGNIFICANT CHANGE UP (ref 0–0.5)
EOSINOPHIL NFR BLD AUTO: 1.6 % — SIGNIFICANT CHANGE UP (ref 0–6)
EOSINOPHIL NFR BLD AUTO: 1.6 % — SIGNIFICANT CHANGE UP (ref 0–6)
GLUCOSE BLDC GLUCOMTR-MCNC: 104 MG/DL — HIGH (ref 70–99)
GLUCOSE BLDC GLUCOMTR-MCNC: 104 MG/DL — HIGH (ref 70–99)
GLUCOSE BLDC GLUCOMTR-MCNC: 114 MG/DL — HIGH (ref 70–99)
GLUCOSE BLDC GLUCOMTR-MCNC: 114 MG/DL — HIGH (ref 70–99)
GLUCOSE SERPL-MCNC: 100 MG/DL — HIGH (ref 70–99)
GLUCOSE SERPL-MCNC: 100 MG/DL — HIGH (ref 70–99)
HCT VFR BLD CALC: 36.2 % — SIGNIFICANT CHANGE UP (ref 34.5–45)
HCT VFR BLD CALC: 36.2 % — SIGNIFICANT CHANGE UP (ref 34.5–45)
HGB BLD-MCNC: 11.7 G/DL — SIGNIFICANT CHANGE UP (ref 11.5–15.5)
HGB BLD-MCNC: 11.7 G/DL — SIGNIFICANT CHANGE UP (ref 11.5–15.5)
HYPOCHROMIA BLD QL: SLIGHT — SIGNIFICANT CHANGE UP
HYPOCHROMIA BLD QL: SLIGHT — SIGNIFICANT CHANGE UP
LACTATE SERPL-SCNC: 1.6 MMOL/L — SIGNIFICANT CHANGE UP (ref 0.5–2)
LACTATE SERPL-SCNC: 1.6 MMOL/L — SIGNIFICANT CHANGE UP (ref 0.5–2)
LYMPHOCYTES # BLD AUTO: 1.25 K/UL — SIGNIFICANT CHANGE UP (ref 1–3.3)
LYMPHOCYTES # BLD AUTO: 1.25 K/UL — SIGNIFICANT CHANGE UP (ref 1–3.3)
LYMPHOCYTES # BLD AUTO: 7.3 % — LOW (ref 13–44)
LYMPHOCYTES # BLD AUTO: 7.3 % — LOW (ref 13–44)
MAGNESIUM SERPL-MCNC: 2.2 MG/DL — SIGNIFICANT CHANGE UP (ref 1.6–2.6)
MAGNESIUM SERPL-MCNC: 2.2 MG/DL — SIGNIFICANT CHANGE UP (ref 1.6–2.6)
MANUAL SMEAR VERIFICATION: SIGNIFICANT CHANGE UP
MANUAL SMEAR VERIFICATION: SIGNIFICANT CHANGE UP
MCHC RBC-ENTMCNC: 32.3 GM/DL — SIGNIFICANT CHANGE UP (ref 32–36)
MCHC RBC-ENTMCNC: 32.3 GM/DL — SIGNIFICANT CHANGE UP (ref 32–36)
MCHC RBC-ENTMCNC: 32.5 PG — SIGNIFICANT CHANGE UP (ref 27–34)
MCHC RBC-ENTMCNC: 32.5 PG — SIGNIFICANT CHANGE UP (ref 27–34)
MCV RBC AUTO: 100.6 FL — HIGH (ref 80–100)
MCV RBC AUTO: 100.6 FL — HIGH (ref 80–100)
MICROCYTES BLD QL: SLIGHT — SIGNIFICANT CHANGE UP
MICROCYTES BLD QL: SLIGHT — SIGNIFICANT CHANGE UP
MONOCYTES # BLD AUTO: 0.27 K/UL — SIGNIFICANT CHANGE UP (ref 0–0.9)
MONOCYTES # BLD AUTO: 0.27 K/UL — SIGNIFICANT CHANGE UP (ref 0–0.9)
MONOCYTES NFR BLD AUTO: 1.6 % — LOW (ref 2–14)
MONOCYTES NFR BLD AUTO: 1.6 % — LOW (ref 2–14)
N GONORRHOEA RRNA SPEC QL NAA+PROBE: DETECTED
N GONORRHOEA RRNA SPEC QL NAA+PROBE: DETECTED
NEUTROPHILS # BLD AUTO: 15.31 K/UL — HIGH (ref 1.8–7.4)
NEUTROPHILS # BLD AUTO: 15.31 K/UL — HIGH (ref 1.8–7.4)
NEUTROPHILS NFR BLD AUTO: 89.5 % — HIGH (ref 43–77)
NEUTROPHILS NFR BLD AUTO: 89.5 % — HIGH (ref 43–77)
PHOSPHATE SERPL-MCNC: 2.1 MG/DL — LOW (ref 2.5–4.5)
PHOSPHATE SERPL-MCNC: 2.1 MG/DL — LOW (ref 2.5–4.5)
PLAT MORPH BLD: NORMAL — SIGNIFICANT CHANGE UP
PLAT MORPH BLD: NORMAL — SIGNIFICANT CHANGE UP
PLATELET # BLD AUTO: 207 K/UL — SIGNIFICANT CHANGE UP (ref 150–400)
PLATELET # BLD AUTO: 207 K/UL — SIGNIFICANT CHANGE UP (ref 150–400)
POLYCHROMASIA BLD QL SMEAR: SLIGHT — SIGNIFICANT CHANGE UP
POLYCHROMASIA BLD QL SMEAR: SLIGHT — SIGNIFICANT CHANGE UP
POTASSIUM SERPL-MCNC: 4.5 MMOL/L — SIGNIFICANT CHANGE UP (ref 3.5–5.3)
POTASSIUM SERPL-MCNC: 4.5 MMOL/L — SIGNIFICANT CHANGE UP (ref 3.5–5.3)
POTASSIUM SERPL-SCNC: 4.5 MMOL/L — SIGNIFICANT CHANGE UP (ref 3.5–5.3)
POTASSIUM SERPL-SCNC: 4.5 MMOL/L — SIGNIFICANT CHANGE UP (ref 3.5–5.3)
PROT SERPL-MCNC: 7.1 G/DL — SIGNIFICANT CHANGE UP (ref 6–8.3)
PROT SERPL-MCNC: 7.1 G/DL — SIGNIFICANT CHANGE UP (ref 6–8.3)
RBC # BLD: 3.6 M/UL — LOW (ref 3.8–5.2)
RBC # BLD: 3.6 M/UL — LOW (ref 3.8–5.2)
RBC # FLD: 12.9 % — SIGNIFICANT CHANGE UP (ref 10.3–14.5)
RBC # FLD: 12.9 % — SIGNIFICANT CHANGE UP (ref 10.3–14.5)
RBC BLD AUTO: ABNORMAL
RBC BLD AUTO: ABNORMAL
SODIUM SERPL-SCNC: 137 MMOL/L — SIGNIFICANT CHANGE UP (ref 135–145)
SODIUM SERPL-SCNC: 137 MMOL/L — SIGNIFICANT CHANGE UP (ref 135–145)
SPECIMEN SOURCE: SIGNIFICANT CHANGE UP
WBC # BLD: 17.11 K/UL — HIGH (ref 3.8–10.5)
WBC # BLD: 17.11 K/UL — HIGH (ref 3.8–10.5)
WBC # FLD AUTO: 17.11 K/UL — HIGH (ref 3.8–10.5)
WBC # FLD AUTO: 17.11 K/UL — HIGH (ref 3.8–10.5)

## 2023-10-18 PROCEDURE — ZZZZZ: CPT

## 2023-10-18 PROCEDURE — 99233 SBSQ HOSP IP/OBS HIGH 50: CPT

## 2023-10-18 PROCEDURE — 71045 X-RAY EXAM CHEST 1 VIEW: CPT | Mod: 26

## 2023-10-18 RX ORDER — IOHEXOL 300 MG/ML
30 INJECTION, SOLUTION INTRAVENOUS ONCE
Refills: 0 | Status: COMPLETED | OUTPATIENT
Start: 2023-10-18 | End: 2023-10-19

## 2023-10-18 RX ORDER — ACETAMINOPHEN 500 MG
1000 TABLET ORAL ONCE
Refills: 0 | Status: COMPLETED | OUTPATIENT
Start: 2023-10-18 | End: 2023-10-18

## 2023-10-18 RX ORDER — CEFTRIAXONE 500 MG/1
500 INJECTION, POWDER, FOR SOLUTION INTRAMUSCULAR; INTRAVENOUS ONCE
Refills: 0 | Status: COMPLETED | OUTPATIENT
Start: 2023-10-18 | End: 2023-10-18

## 2023-10-18 RX ORDER — ACETAMINOPHEN 500 MG
1000 TABLET ORAL ONCE
Refills: 0 | Status: DISCONTINUED | OUTPATIENT
Start: 2023-10-18 | End: 2023-10-18

## 2023-10-18 RX ADMIN — Medication 1 MILLIGRAM(S): at 11:11

## 2023-10-18 RX ADMIN — SODIUM CHLORIDE 80 MILLILITER(S): 9 INJECTION, SOLUTION INTRAVENOUS at 15:30

## 2023-10-18 RX ADMIN — Medication 100 MILLIGRAM(S): at 11:15

## 2023-10-18 RX ADMIN — Medication 0.5 MILLIGRAM(S): at 17:44

## 2023-10-18 RX ADMIN — Medication 400 MILLIGRAM(S): at 13:03

## 2023-10-18 RX ADMIN — Medication 1000 MILLIGRAM(S): at 21:00

## 2023-10-18 RX ADMIN — Medication 1000 MILLIGRAM(S): at 03:20

## 2023-10-18 RX ADMIN — PIPERACILLIN AND TAZOBACTAM 25 GRAM(S): 4; .5 INJECTION, POWDER, LYOPHILIZED, FOR SOLUTION INTRAVENOUS at 11:11

## 2023-10-18 RX ADMIN — PANTOPRAZOLE SODIUM 40 MILLIGRAM(S): 20 TABLET, DELAYED RELEASE ORAL at 11:11

## 2023-10-18 RX ADMIN — Medication 400 MILLIGRAM(S): at 20:37

## 2023-10-18 RX ADMIN — PIPERACILLIN AND TAZOBACTAM 25 GRAM(S): 4; .5 INJECTION, POWDER, LYOPHILIZED, FOR SOLUTION INTRAVENOUS at 02:14

## 2023-10-18 RX ADMIN — Medication 1000 MILLIGRAM(S): at 13:40

## 2023-10-18 RX ADMIN — PIPERACILLIN AND TAZOBACTAM 25 GRAM(S): 4; .5 INJECTION, POWDER, LYOPHILIZED, FOR SOLUTION INTRAVENOUS at 19:39

## 2023-10-18 RX ADMIN — Medication 0.5 MILLIGRAM(S): at 22:19

## 2023-10-18 RX ADMIN — CEFTRIAXONE 500 MILLIGRAM(S): 500 INJECTION, POWDER, FOR SOLUTION INTRAMUSCULAR; INTRAVENOUS at 17:44

## 2023-10-18 RX ADMIN — ENOXAPARIN SODIUM 40 MILLIGRAM(S): 100 INJECTION SUBCUTANEOUS at 05:27

## 2023-10-18 RX ADMIN — Medication 400 MILLIGRAM(S): at 02:49

## 2023-10-18 NOTE — CONSULT NOTE ADULT - ATTENDING COMMENTS
48 year old female initially admitted to SICU for sepsis with abdominal pain and lactic, found to have enteritis on Ct and being treated with zosyn. Has been improving significantly clinically and so downgraded to surgical stepdown. Has been having sinus tachycardia which has been improving throughout her stay. Gynecology evaluated. Now found to be gonorrhea positive. Medical stepdown/ICU consulted for transfer to medical stepdown service.  On my evaluation patient non toxic appearing. Abdomen distended but not rigid or tender. no rebound or guarding.  Tachycardia sinus and has been improving. No need for continuous telemetry in medical step down. Discussed with consulted internal medicine hospitalist and if transferring to medicine can send to general medical [facility].    Decision making of high complexity

## 2023-10-18 NOTE — PROGRESS NOTE ADULT - ASSESSMENT
48F with PMHx of HTN, HLD and anxiety and PSHx of remote appendectomy who presents to [facility] for acute-onset abdominal pain this AM which woke her up. Pt initially admitted to the SICU for monitoring and elevated lactate; pt stabilzed w/ IVF and subsequently stepped down to telemetry (10/18).     NPO/IVF   Zosyn  pain/nausea PRN  Ativan 0.5mg PRN  SCD/Lovenox qd  AM Lactate  CIWA

## 2023-10-18 NOTE — CONSULT NOTE ADULT - SUBJECTIVE AND OBJECTIVE BOX
ICU Consult Note    47 yo F with PMHx of HTN, HLD and anxiety and PSHx of remote appendectomy who presented with acute onset abdominal pain and found to be tachycardic, hypotensive, temp to 100.3, leukocytosis to 13, with lactate on 7 suggestive of sepsis. Patient admitted to SICU 10/16 for hemodynamic monitoring. Treated with zosyn. Patient remained tachycardic, however MAPs >65 and a-febrile. lactate trend 7 -> 5.6 -> 4.3 and today cleared. Patient also with resolving mild transaminitis and elevated Tbili (i/s/o known alcohol use disorder). CT A/P 10/17 suggestive of enteritis without other acute pathologic process. Patient evaluated by GYN with no concern for acute GYN process. Pt was stepped down to surgical telemetry. Had a low grade temperature 100.7 and increased wbc however tachycardia improved and abd exam stable, now eating and having daily BM's.    SUBJECTIVE / INTERVAL HPI: Patient seen and examined at bedside. Pt very frustrated at still having to be in hospital. Was also concerned about not having normal size BM and still feels bloated. Had 2 BM's yesterday and 1 today.     Medical History: HTN, HLD, anxiety  Surgical History: Appendectomy, Breast implants, lumbar spinal fusion  Medications: Lisinopril 10, Seroquel 25 qd, Atorvastatin 20 qd, Vivanes qd  Allergies: NKDA  Social History: Patient states she smokes and tobacco occasionally (1-2 times per week). States she drinks 2 alcoholic beverages >5 times per week. Denies any other drug use including amphetamines.      VITAL SIGNS:  Vital Signs Last 24 Hrs  T(C): 37.1 (18 Oct 2023 14:35), Max: 38.2 (17 Oct 2023 20:53)  T(F): 98.7 (18 Oct 2023 14:35), Max: 100.7 (17 Oct 2023 20:53)  HR: 110 (18 Oct 2023 14:35) (104 - 130)  BP: 140/97 (18 Oct 2023 14:35) (119/82 - 165/119)  BP(mean): 110 (17 Oct 2023 20:00) (110 - 137)  RR: 17 (18 Oct 2023 14:35) (17 - 28)  SpO2: 96% (18 Oct 2023 14:35) (94% - 99%)    Parameters below as of 18 Oct 2023 14:35  Patient On (Oxygen Delivery Method): room air          10-17-23 @ 07:01  -  10-18-23 @ 07:00  --------------------------------------------------------  IN: 1800 mL / OUT: 1700 mL / NET: 100 mL    10-18-23 @ 07:01  -  10-18-23 @ 16:36  --------------------------------------------------------  IN: 1120 mL / OUT: 450 mL / NET: 670 mL        PHYSICAL EXAM:    General: NAD, sitting on side of bed  HEENT: MMM, EOMI  Neck: supple  Cardiovascular: +S1/S2; tachycardic, regular  Respiratory: CTA B/L; no W/R/R, no accessory musc use  Gastrointestinal: soft, distended, NT to moderate palpation, no rebound/guarding  Extremities: warm and dry, no LE edema  Vascular: 2+ radial, 1+ DP pulses B/L  Neurological: AAOx3; CNs grossly intact, no focal deficits  Psych: anxious, teary eyed    MEDICATIONS:  MEDICATIONS  (STANDING):  cefTRIAXone Injectable 500 milliGRAM(s) IntraMuscular once  dextrose 5% + sodium chloride 0.45%. 1000 milliLiter(s) (80 mL/Hr) IV Continuous <Continuous>  enoxaparin Injectable 40 milliGRAM(s) SubCutaneous every 24 hours  folic acid Injectable 1 milliGRAM(s) IV Push daily  influenza   Vaccine 0.5 milliLiter(s) IntraMuscular once  pantoprazole  Injectable 40 milliGRAM(s) IV Push daily  piperacillin/tazobactam IVPB.. 3.375 Gram(s) IV Intermittent every 8 hours  thiamine Injectable 100 milliGRAM(s) IV Push daily    MEDICATIONS  (PRN):  LORazepam   Injectable 0.5 milliGRAM(s) IV Push every 4 hours PRN Anxiety      ALLERGIES:  Allergies    No Known Allergies    Intolerances        LABS:                        11.7   17.11 )-----------( 207      ( 18 Oct 2023 05:30 )             36.2     10-18    137  |  105  |  16  ----------------------------<  100<H>  4.5   |  16<L>  |  0.74    Ca    9.1      18 Oct 2023 05:30  Phos  2.1     10-18  Mg     2.2     10-18    TPro  7.1  /  Alb  2.8<L>  /  TBili  0.8  /  DBili  x   /  AST  31  /  ALT  26  /  AlkPhos  156<H>  10-18    PT/INR - ( 16 Oct 2023 20:47 )   PT: 15.0 sec;   INR: 1.33          PTT - ( 16 Oct 2023 20:47 )  PTT:31.2 sec  Urinalysis Basic - ( 18 Oct 2023 05:30 )    Color: x / Appearance: x / SG: x / pH: x  Gluc: 100 mg/dL / Ketone: x  / Bili: x / Urobili: x   Blood: x / Protein: x / Nitrite: x   Leuk Esterase: x / RBC: x / WBC x   Sq Epi: x / Non Sq Epi: x / Bacteria: x      CAPILLARY BLOOD GLUCOSE      POCT Blood Glucose.: 104 mg/dL (18 Oct 2023 06:30)      RADIOLOGY & ADDITIONAL TESTS: Reviewed.

## 2023-10-18 NOTE — PROGRESS NOTE ADULT - SUBJECTIVE AND OBJECTIVE BOX
Patient is a 48y old  Female who presents with a chief complaint of Abdominal pain with sepsis (18 Oct 2023 11:14)      INTERVAL HPI/OVERNIGHT EVENTS: continues to have abd pain and discomfort     MEDICATIONS  (STANDING):  dextrose 5% + sodium chloride 0.45%. 1000 milliLiter(s) (80 mL/Hr) IV Continuous <Continuous>  enoxaparin Injectable 40 milliGRAM(s) SubCutaneous every 24 hours  folic acid Injectable 1 milliGRAM(s) IV Push daily  influenza   Vaccine 0.5 milliLiter(s) IntraMuscular once  pantoprazole  Injectable 40 milliGRAM(s) IV Push daily  piperacillin/tazobactam IVPB.. 3.375 Gram(s) IV Intermittent every 8 hours  thiamine Injectable 100 milliGRAM(s) IV Push daily    MEDICATIONS  (PRN):  LORazepam   Injectable 0.5 milliGRAM(s) IV Push every 4 hours PRN Anxiety      __________________________________________________  REVIEW OF SYSTEMS:    CONSTITUTIONAL: No fever,   EYES: no acute visual disturbances  NECK: No pain or stiffness  RESPIRATORY: No cough; No shortness of breath  CARDIOVASCULAR: No chest pain, no palpitations  GASTROINTESTINAL: + pain.  No diarrhea   NEUROLOGICAL: No headache or numbness, no tremors  MUSCULOSKELETAL: +back pain, no muscle pain  GENITOURINARY: no dysuria, no frequency, no hesitancy  PSYCHIATRY: no depression , no anxiety  ALL OTHER  ROS negative        Vital Signs Last 24 Hrs  T(C): 37.2 (18 Oct 2023 09:23), Max: 38.2 (17 Oct 2023 20:53)  T(F): 98.9 (18 Oct 2023 09:23), Max: 100.7 (17 Oct 2023 20:53)  HR: 108 (18 Oct 2023 09:23) (104 - 130)  BP: 142/95 (18 Oct 2023 09:23) (119/82 - 165/119)  BP(mean): 110 (17 Oct 2023 20:00) (110 - 137)  RR: 18 (18 Oct 2023 09:23) (18 - 32)  SpO2: 99% (18 Oct 2023 09:23) (94% - 99%)    Parameters below as of 18 Oct 2023 09:23  Patient On (Oxygen Delivery Method): room air        ________________________________________________  PHYSICAL EXAM:  GENERAL: NAD  HEENT: Normocephalic;  conjunctivae and sclerae clear; moist mucous membranes;   NECK : supple  CHEST/LUNG: Clear to auscultation bilaterally with good air entry   HEART: S1 S2  regular; no murmurs, gallops or rubs  ABDOMEN: Soft, TTP diffusely, very distended; delayed Bowel sounds present  EXTREMITIES: no cyanosis; no edema; no calf tenderness  SKIN: warm and dry; no rash  NERVOUS SYSTEM:  Awake and alert; Oriented  to place, person and time ; no new deficits    _________________________________________________  LABS:                        11.7   17.11 )-----------( 207      ( 18 Oct 2023 05:30 )             36.2     10-18    137  |  105  |  16  ----------------------------<  100<H>  4.5   |  16<L>  |  0.74    Ca    9.1      18 Oct 2023 05:30  Phos  2.1     10-18  Mg     2.2     10-18    TPro  7.1  /  Alb  2.8<L>  /  TBili  0.8  /  DBili  x   /  AST  31  /  ALT  26  /  AlkPhos  156<H>  10-18    PT/INR - ( 16 Oct 2023 20:47 )   PT: 15.0 sec;   INR: 1.33          PTT - ( 16 Oct 2023 20:47 )  PTT:31.2 sec  Urinalysis Basic - ( 18 Oct 2023 05:30 )    Color: x / Appearance: x / SG: x / pH: x  Gluc: 100 mg/dL / Ketone: x  / Bili: x / Urobili: x   Blood: x / Protein: x / Nitrite: x   Leuk Esterase: x / RBC: x / WBC x   Sq Epi: x / Non Sq Epi: x / Bacteria: x      CAPILLARY BLOOD GLUCOSE      POCT Blood Glucose.: 104 mg/dL (18 Oct 2023 06:30)  POCT Blood Glucose.: 114 mg/dL (18 Oct 2023 00:26)  POCT Blood Glucose.: 107 mg/dL (17 Oct 2023 18:32)        RADIOLOGY & ADDITIONAL TESTS:  c< from: CT Abdomen and Pelvis w/ Oral Cont and w/ IV Cont (10.16.23 @ 19:04) >  FINDINGS:  LOWER CHEST: Partially visualized bilateral breast implants. Mild   dependent density at the lung bases.    LIVER: Hepatomegaly  BILE DUCTS: Normal caliber.  GALLBLADDER: Within normal limits.  SPLEEN: Withinnormal limits.  PANCREAS: Within normal limits.  ADRENALS: Within normal limits.  KIDNEYS/URETERS: Within normal limits.    BLADDER: Within normal limits.  REPRODUCTIVE ORGANS: IUD in the uterus. No adnexal mass    BOWEL: No bowel obstruction. Appendix is not seen and may be surgically   absent. There is mild small bowel prominence with wall thickening in the   mid small bowel suggestive of enteritis. Fluid in the colon may be seen   with diarrhea  PERITONEUM: No ascites.  VESSELS: Within normal limits.  RETROPERITONEUM/LYMPH NODES: No lymphadenopathy.  ABDOMINAL WALL: Within normal limits.  BONES: Postoperative changes in the lower lumbar spine with posterior   fusion hardware at L4 and L5    < end of copied text >  < from: US Pelvis Complete (03.16.21 @ 15:50) >    Impression: No adnexal mass. No evidence of ovarian torsion.  1.9 cm right ovarian lesion could represent hemorrhagic cyst. More likely than endometrioma. Follow-up with MR recommended.    < end of copied text >      Plan of care was discussed with patient and /or primary care giver; all questions and concerns were addressed and care was aligned with patient's wishes.

## 2023-10-18 NOTE — PROGRESS NOTE ADULT - ASSESSMENT
49 yo F with PMHx of ADHD, anxiety and PSHx of remote appendectomy who presented with acute onset abdominal pain, admitted for severe sepsis (tachycadria, bandemia to 19%, respiratory rate in the 30's, elevated lactate, source/suspected enteritis admitted for SICU for HD instability now stepped down to surgical service for further management       severe sepsis -likely source abdomen -enteritis ( diarrhea )  bandemia  CT abd /pelvis cw enteritis   pelvic US No adnexal mass. No evidence of ovarian torsion. 1.9 cm right ovarian lesion could represent hemorrhagic cyst. IUD   would need close monitoring and resuscitation,   currently on BS antibiotics with Zosyn,   NGTD--fu blood cx    WBC 17   lactic acidosis resolved after fluid resuscitation 7--> 1.6 ( 2/2 bowel hypoperfusion 2/2 hypotension)   though hypotension responsive to IVF , she remains tachycardic - recommend consult for telemedicine   stool sample testing, ruled out c diff (neg)      anxiety   on Seroquel, saphris at home   would restart seroquel if qtc ok on repeat ekg, 470    would get psych to weight in for Saprhis       ADHD  Patient reports 60mg vyvance daily at home.     substance use disorder  pt uses [facility]-   utox positive for thc, amphetamine( takes Vyvanse), opioids  received opioids in ED    hypophosphatemia   phos 2.1--replete and repeat labs in am     dvt ppx lovenox qd

## 2023-10-18 NOTE — CONSULT NOTE ADULT - ASSESSMENT
48F with PMHx of HTN, HLD and anxiety and PSHx of remote appendectomy who presents to [facility] for acute-onset abdominal pain. Pt initially admitted to the SICU for severe sepsis 2/2 enteritis seen on CT scan. Clinically improved with IV Zosyn and IVF. Stepped down to surgical telemetry. ICU Consulted for sepsis and triaging.      #sepsis 2/2 enteritis  Pt overall clinically improved while being treated with Zosyn and IVF. Only objective signs of infection are from enteritis seen on CT scan which could also explain the abd pain that pt came with.  Gyn already evaluated and ruled out PID. Does have remote hx of lumbar spinal fusion but surgical site looks pristine and does not have any back pain/neuropathic pain.  Abd tenderness improved and eating/having BM's. However also did have a low grade temp overnight and increase in leukocytosis.  Tachycardia has been improved now consistently <120 and although still meeting sepsis criteria, not in severe sepsis anymore.  Recommendations:  - c/w Zosyn  - trend CBC with differential  - trend temperature curve  - monitor I/Os and encourage PO intake  - does not have any telemetry needs now that HR has improved      #sinus tachycardia  Due to sepsis.  Telemetry reviewed. HR overall improved and still in sinus.   - management of sepsis as above  - if tachycardia worsens consistently >120 then get ecg to confirm rhythm      At this time patient does not have telemetry needs and can be managed on a regional floor.  Plan discussed with ICU Consult Attending.

## 2023-10-18 NOTE — PROGRESS NOTE ADULT - SUBJECTIVE AND OBJECTIVE BOX
SUBJECTIVE:  Patient was evaluated at bedside this AM by general surgery team. Patient is doing well this AM and reports improving pain. Patient currently denies nausea or vomiting and inquires about when she can have a diet by mouth.     MEDICATIONS  (STANDING):  dextrose 5% + sodium chloride 0.45%. 1000 milliLiter(s) (80 mL/Hr) IV Continuous <Continuous>  enoxaparin Injectable 40 milliGRAM(s) SubCutaneous every 24 hours  folic acid Injectable 1 milliGRAM(s) IV Push daily  influenza   Vaccine 0.5 milliLiter(s) IntraMuscular once  insulin lispro (ADMELOG) corrective regimen sliding scale   SubCutaneous every 6 hours  pantoprazole  Injectable 40 milliGRAM(s) IV Push daily  piperacillin/tazobactam IVPB.. 3.375 Gram(s) IV Intermittent every 8 hours  thiamine Injectable 100 milliGRAM(s) IV Push daily    MEDICATIONS  (PRN):  LORazepam   Injectable 0.5 milliGRAM(s) IV Push every 4 hours PRN Anxiety      Vital Signs Last 24 Hrs  T(C): 37.2 (18 Oct 2023 09:23), Max: 38.2 (17 Oct 2023 20:53)  T(F): 98.9 (18 Oct 2023 09:23), Max: 100.7 (17 Oct 2023 20:53)  HR: 108 (18 Oct 2023 09:23) (104 - 130)  BP: 142/95 (18 Oct 2023 09:23) (119/82 - 165/119)  BP(mean): 110 (17 Oct 2023 20:00) (107 - 137)  RR: 18 (18 Oct 2023 09:23) (18 - 32)  SpO2: 99% (18 Oct 2023 09:23) (94% - 99%)    Parameters below as of 18 Oct 2023 09:23  Patient On (Oxygen Delivery Method): room air        Physical Exam:  General: NAD, resting comfortably in bed  Pulmonary: Nonlabored breathing, no respiratory distress  Cardiovascular: NSR  Abdominal: soft, NT, moderately improving abdominal distension without rebound or guarding  Extremities: WWP  Neuro: A/O x 3, CNs II-XII grossly intact    I&O's Summary    17 Oct 2023 07:01  -  18 Oct 2023 07:00  --------------------------------------------------------  IN: 1800 mL / OUT: 1700 mL / NET: 100 mL    18 Oct 2023 07:01  -  18 Oct 2023 11:14  --------------------------------------------------------  IN: 0 mL / OUT: 150 mL / NET: -150 mL        LABS:                        11.7   17.11 )-----------( 207      ( 18 Oct 2023 05:30 )             36.2     10-18    137  |  105  |  16  ----------------------------<  100<H>  4.5   |  16<L>  |  0.74    Ca    9.1      18 Oct 2023 05:30  Phos  2.1     10-18  Mg     2.2     10-18    TPro  7.1  /  Alb  2.8<L>  /  TBili  0.8  /  DBili  x   /  AST  31  /  ALT  26  /  AlkPhos  156<H>  10-18    PT/INR - ( 16 Oct 2023 20:47 )   PT: 15.0 sec;   INR: 1.33          PTT - ( 16 Oct 2023 20:47 )  PTT:31.2 sec  Urinalysis Basic - ( 18 Oct 2023 05:30 )    Color: x / Appearance: x / SG: x / pH: x  Gluc: 100 mg/dL / Ketone: x  / Bili: x / Urobili: x   Blood: x / Protein: x / Nitrite: x   Leuk Esterase: x / RBC: x / WBC x   Sq Epi: x / Non Sq Epi: x / Bacteria: x      CAPILLARY BLOOD GLUCOSE      POCT Blood Glucose.: 104 mg/dL (18 Oct 2023 06:30)  POCT Blood Glucose.: 114 mg/dL (18 Oct 2023 00:26)  POCT Blood Glucose.: 107 mg/dL (17 Oct 2023 18:32)  POCT Blood Glucose.: 119 mg/dL (17 Oct 2023 13:19)    LIVER FUNCTIONS - ( 18 Oct 2023 05:30 )  Alb: 2.8 g/dL / Pro: 7.1 g/dL / ALK PHOS: 156 U/L / ALT: 26 U/L / AST: 31 U/L / GGT: x             RADIOLOGY & ADDITIONAL STUDIES:

## 2023-10-19 LAB
ALBUMIN SERPL ELPH-MCNC: 2.8 G/DL — LOW (ref 3.3–5)
ALBUMIN SERPL ELPH-MCNC: 2.8 G/DL — LOW (ref 3.3–5)
ALP SERPL-CCNC: 152 U/L — HIGH (ref 40–120)
ALP SERPL-CCNC: 152 U/L — HIGH (ref 40–120)
ALT FLD-CCNC: 16 U/L — SIGNIFICANT CHANGE UP (ref 10–45)
ALT FLD-CCNC: 16 U/L — SIGNIFICANT CHANGE UP (ref 10–45)
ANION GAP SERPL CALC-SCNC: 13 MMOL/L — SIGNIFICANT CHANGE UP (ref 5–17)
ANION GAP SERPL CALC-SCNC: 13 MMOL/L — SIGNIFICANT CHANGE UP (ref 5–17)
AST SERPL-CCNC: 15 U/L — SIGNIFICANT CHANGE UP (ref 10–40)
AST SERPL-CCNC: 15 U/L — SIGNIFICANT CHANGE UP (ref 10–40)
BILIRUB SERPL-MCNC: 0.8 MG/DL — SIGNIFICANT CHANGE UP (ref 0.2–1.2)
BILIRUB SERPL-MCNC: 0.8 MG/DL — SIGNIFICANT CHANGE UP (ref 0.2–1.2)
BUN SERPL-MCNC: 6 MG/DL — LOW (ref 7–23)
BUN SERPL-MCNC: 6 MG/DL — LOW (ref 7–23)
CALCIUM SERPL-MCNC: 8.8 MG/DL — SIGNIFICANT CHANGE UP (ref 8.4–10.5)
CALCIUM SERPL-MCNC: 8.8 MG/DL — SIGNIFICANT CHANGE UP (ref 8.4–10.5)
CHLORIDE SERPL-SCNC: 100 MMOL/L — SIGNIFICANT CHANGE UP (ref 96–108)
CHLORIDE SERPL-SCNC: 100 MMOL/L — SIGNIFICANT CHANGE UP (ref 96–108)
CO2 SERPL-SCNC: 21 MMOL/L — LOW (ref 22–31)
CO2 SERPL-SCNC: 21 MMOL/L — LOW (ref 22–31)
CREAT SERPL-MCNC: 0.65 MG/DL — SIGNIFICANT CHANGE UP (ref 0.5–1.3)
CREAT SERPL-MCNC: 0.65 MG/DL — SIGNIFICANT CHANGE UP (ref 0.5–1.3)
CULTURE RESULTS: SIGNIFICANT CHANGE UP
CULTURE RESULTS: SIGNIFICANT CHANGE UP
EGFR: 107 ML/MIN/1.73M2 — SIGNIFICANT CHANGE UP
EGFR: 107 ML/MIN/1.73M2 — SIGNIFICANT CHANGE UP
GLUCOSE SERPL-MCNC: 127 MG/DL — HIGH (ref 70–99)
GLUCOSE SERPL-MCNC: 127 MG/DL — HIGH (ref 70–99)
HCT VFR BLD CALC: 33.4 % — LOW (ref 34.5–45)
HCT VFR BLD CALC: 33.4 % — LOW (ref 34.5–45)
HGB BLD-MCNC: 11.4 G/DL — LOW (ref 11.5–15.5)
HGB BLD-MCNC: 11.4 G/DL — LOW (ref 11.5–15.5)
LACTATE SERPL-SCNC: 1 MMOL/L — SIGNIFICANT CHANGE UP (ref 0.5–2)
LACTATE SERPL-SCNC: 1 MMOL/L — SIGNIFICANT CHANGE UP (ref 0.5–2)
MAGNESIUM SERPL-MCNC: 1.8 MG/DL — SIGNIFICANT CHANGE UP (ref 1.6–2.6)
MAGNESIUM SERPL-MCNC: 1.8 MG/DL — SIGNIFICANT CHANGE UP (ref 1.6–2.6)
MCHC RBC-ENTMCNC: 32 PG — SIGNIFICANT CHANGE UP (ref 27–34)
MCHC RBC-ENTMCNC: 32 PG — SIGNIFICANT CHANGE UP (ref 27–34)
MCHC RBC-ENTMCNC: 34.1 GM/DL — SIGNIFICANT CHANGE UP (ref 32–36)
MCHC RBC-ENTMCNC: 34.1 GM/DL — SIGNIFICANT CHANGE UP (ref 32–36)
MCV RBC AUTO: 93.8 FL — SIGNIFICANT CHANGE UP (ref 80–100)
MCV RBC AUTO: 93.8 FL — SIGNIFICANT CHANGE UP (ref 80–100)
NRBC # BLD: 0 /100 WBCS — SIGNIFICANT CHANGE UP (ref 0–0)
NRBC # BLD: 0 /100 WBCS — SIGNIFICANT CHANGE UP (ref 0–0)
PHOSPHATE SERPL-MCNC: 1.4 MG/DL — LOW (ref 2.5–4.5)
PHOSPHATE SERPL-MCNC: 1.4 MG/DL — LOW (ref 2.5–4.5)
PLATELET # BLD AUTO: 231 K/UL — SIGNIFICANT CHANGE UP (ref 150–400)
PLATELET # BLD AUTO: 231 K/UL — SIGNIFICANT CHANGE UP (ref 150–400)
POTASSIUM SERPL-MCNC: 3 MMOL/L — LOW (ref 3.5–5.3)
POTASSIUM SERPL-MCNC: 3 MMOL/L — LOW (ref 3.5–5.3)
POTASSIUM SERPL-SCNC: 3 MMOL/L — LOW (ref 3.5–5.3)
POTASSIUM SERPL-SCNC: 3 MMOL/L — LOW (ref 3.5–5.3)
PROT SERPL-MCNC: 6.6 G/DL — SIGNIFICANT CHANGE UP (ref 6–8.3)
PROT SERPL-MCNC: 6.6 G/DL — SIGNIFICANT CHANGE UP (ref 6–8.3)
RBC # BLD: 3.56 M/UL — LOW (ref 3.8–5.2)
RBC # BLD: 3.56 M/UL — LOW (ref 3.8–5.2)
RBC # FLD: 12.7 % — SIGNIFICANT CHANGE UP (ref 10.3–14.5)
RBC # FLD: 12.7 % — SIGNIFICANT CHANGE UP (ref 10.3–14.5)
SODIUM SERPL-SCNC: 134 MMOL/L — LOW (ref 135–145)
SODIUM SERPL-SCNC: 134 MMOL/L — LOW (ref 135–145)
SPECIMEN SOURCE: SIGNIFICANT CHANGE UP
SPECIMEN SOURCE: SIGNIFICANT CHANGE UP
WBC # BLD: 15.78 K/UL — HIGH (ref 3.8–10.5)
WBC # BLD: 15.78 K/UL — HIGH (ref 3.8–10.5)
WBC # FLD AUTO: 15.78 K/UL — HIGH (ref 3.8–10.5)
WBC # FLD AUTO: 15.78 K/UL — HIGH (ref 3.8–10.5)

## 2023-10-19 PROCEDURE — 99231 SBSQ HOSP IP/OBS SF/LOW 25: CPT

## 2023-10-19 PROCEDURE — 99233 SBSQ HOSP IP/OBS HIGH 50: CPT

## 2023-10-19 PROCEDURE — 74177 CT ABD & PELVIS W/CONTRAST: CPT | Mod: 26

## 2023-10-19 PROCEDURE — 74019 RADEX ABDOMEN 2 VIEWS: CPT | Mod: 26

## 2023-10-19 PROCEDURE — 71045 X-RAY EXAM CHEST 1 VIEW: CPT | Mod: 26

## 2023-10-19 RX ORDER — POTASSIUM PHOSPHATE, MONOBASIC POTASSIUM PHOSPHATE, DIBASIC 236; 224 MG/ML; MG/ML
30 INJECTION, SOLUTION INTRAVENOUS ONCE
Refills: 0 | Status: COMPLETED | OUTPATIENT
Start: 2023-10-19 | End: 2023-10-19

## 2023-10-19 RX ORDER — BENZOCAINE AND MENTHOL 5; 1 G/100ML; G/100ML
1 LIQUID ORAL
Refills: 0 | Status: DISCONTINUED | OUTPATIENT
Start: 2023-10-19 | End: 2023-10-23

## 2023-10-19 RX ORDER — ACETAMINOPHEN 500 MG
1000 TABLET ORAL ONCE
Refills: 0 | Status: COMPLETED | OUTPATIENT
Start: 2023-10-19 | End: 2023-10-19

## 2023-10-19 RX ORDER — MAGNESIUM SULFATE 500 MG/ML
1 VIAL (ML) INJECTION ONCE
Refills: 0 | Status: COMPLETED | OUTPATIENT
Start: 2023-10-19 | End: 2023-10-19

## 2023-10-19 RX ORDER — ACETAMINOPHEN 500 MG
1000 TABLET ORAL ONCE
Refills: 0 | Status: COMPLETED | OUTPATIENT
Start: 2023-10-20 | End: 2023-10-20

## 2023-10-19 RX ORDER — BENZOCAINE 10 %
1 GEL (GRAM) MUCOUS MEMBRANE ONCE
Refills: 0 | Status: COMPLETED | OUTPATIENT
Start: 2023-10-19 | End: 2023-10-19

## 2023-10-19 RX ORDER — POTASSIUM CHLORIDE 20 MEQ
10 PACKET (EA) ORAL
Refills: 0 | Status: COMPLETED | OUTPATIENT
Start: 2023-10-19 | End: 2023-10-19

## 2023-10-19 RX ORDER — HYDROMORPHONE HYDROCHLORIDE 2 MG/ML
0.25 INJECTION INTRAMUSCULAR; INTRAVENOUS; SUBCUTANEOUS ONCE
Refills: 0 | Status: DISCONTINUED | OUTPATIENT
Start: 2023-10-19 | End: 2023-10-19

## 2023-10-19 RX ORDER — CEFTRIAXONE 500 MG/1
500 INJECTION, POWDER, FOR SOLUTION INTRAMUSCULAR; INTRAVENOUS ONCE
Refills: 0 | Status: DISCONTINUED | OUTPATIENT
Start: 2023-10-19 | End: 2023-10-19

## 2023-10-19 RX ORDER — BENZOCAINE AND MENTHOL 5; 1 G/100ML; G/100ML
1 LIQUID ORAL ONCE
Refills: 0 | Status: COMPLETED | OUTPATIENT
Start: 2023-10-19 | End: 2023-10-19

## 2023-10-19 RX ORDER — MAGNESIUM SULFATE 500 MG/ML
1 VIAL (ML) INJECTION ONCE
Refills: 0 | Status: DISCONTINUED | OUTPATIENT
Start: 2023-10-19 | End: 2023-10-19

## 2023-10-19 RX ADMIN — PIPERACILLIN AND TAZOBACTAM 25 GRAM(S): 4; .5 INJECTION, POWDER, LYOPHILIZED, FOR SOLUTION INTRAVENOUS at 11:45

## 2023-10-19 RX ADMIN — Medication 100 MILLIEQUIVALENT(S): at 10:38

## 2023-10-19 RX ADMIN — ENOXAPARIN SODIUM 40 MILLIGRAM(S): 100 INJECTION SUBCUTANEOUS at 06:27

## 2023-10-19 RX ADMIN — POTASSIUM PHOSPHATE, MONOBASIC POTASSIUM PHOSPHATE, DIBASIC 83.33 MILLIMOLE(S): 236; 224 INJECTION, SOLUTION INTRAVENOUS at 13:39

## 2023-10-19 RX ADMIN — BENZOCAINE AND MENTHOL 1 LOZENGE: 5; 1 LIQUID ORAL at 02:18

## 2023-10-19 RX ADMIN — Medication 0.5 MILLIGRAM(S): at 09:22

## 2023-10-19 RX ADMIN — Medication 400 MILLIGRAM(S): at 23:21

## 2023-10-19 RX ADMIN — Medication 1 SPRAY(S): at 01:28

## 2023-10-19 RX ADMIN — Medication 1 SPRAY(S): at 14:38

## 2023-10-19 RX ADMIN — Medication 100 GRAM(S): at 09:10

## 2023-10-19 RX ADMIN — SODIUM CHLORIDE 100 MILLILITER(S): 9 INJECTION, SOLUTION INTRAVENOUS at 09:09

## 2023-10-19 RX ADMIN — Medication 1 SPRAY(S): at 09:23

## 2023-10-19 RX ADMIN — Medication 0.5 MILLIGRAM(S): at 19:20

## 2023-10-19 RX ADMIN — PANTOPRAZOLE SODIUM 40 MILLIGRAM(S): 20 TABLET, DELAYED RELEASE ORAL at 09:10

## 2023-10-19 RX ADMIN — HYDROMORPHONE HYDROCHLORIDE 0.25 MILLIGRAM(S): 2 INJECTION INTRAMUSCULAR; INTRAVENOUS; SUBCUTANEOUS at 00:24

## 2023-10-19 RX ADMIN — Medication 100 MILLIEQUIVALENT(S): at 12:47

## 2023-10-19 RX ADMIN — HYDROMORPHONE HYDROCHLORIDE 0.25 MILLIGRAM(S): 2 INJECTION INTRAMUSCULAR; INTRAVENOUS; SUBCUTANEOUS at 00:19

## 2023-10-19 RX ADMIN — Medication 0.5 MILLIGRAM(S): at 05:32

## 2023-10-19 RX ADMIN — Medication 1 MILLIGRAM(S): at 11:46

## 2023-10-19 RX ADMIN — Medication 100 MILLIGRAM(S): at 11:16

## 2023-10-19 RX ADMIN — PIPERACILLIN AND TAZOBACTAM 25 GRAM(S): 4; .5 INJECTION, POWDER, LYOPHILIZED, FOR SOLUTION INTRAVENOUS at 19:10

## 2023-10-19 RX ADMIN — IOHEXOL 30 MILLILITER(S): 300 INJECTION, SOLUTION INTRAVENOUS at 00:55

## 2023-10-19 RX ADMIN — PIPERACILLIN AND TAZOBACTAM 25 GRAM(S): 4; .5 INJECTION, POWDER, LYOPHILIZED, FOR SOLUTION INTRAVENOUS at 03:03

## 2023-10-19 RX ADMIN — Medication 100 MILLIEQUIVALENT(S): at 11:46

## 2023-10-19 RX ADMIN — Medication 400 MILLIGRAM(S): at 11:20

## 2023-10-19 RX ADMIN — Medication 1000 MILLIGRAM(S): at 11:35

## 2023-10-19 NOTE — PROGRESS NOTE ADULT - ASSESSMENT
47 yo F with PMHx of ADHD, anxiety and PSHx of remote appendectomy who presented with acute onset abdominal pain, admitted for severe sepsis (tachycadria, bandemia to 19%, respiratory rate in the 30's, elevated lactate, source/suspected enteritis admitted for SICU for HD instability now stepped down to surgical service for further management       SBO   NPO W NGT   hyponatremia, hypokalemia , hypophosphatemia   recommend iv replacement   rest of the care per primary team     severe sepsis -likely source abdomen -enteritis ( diarrhea )   bandemia  initial CT abd /pelvis cw enteritis -- repeat CT w SBO   pelvic US No adnexal mass. No evidence of ovarian torsion. 1.9 cm right ovarian lesion could represent hemorrhagic cyst. IUD   would need close monitoring and resuscitation,   currently on BS antibiotics with Zosyn,   NGTD--fu blood cx    WBC 17 --15k   lactic acidosis resolved after fluid resuscitation 7--> 1.6 ( 2/2 bowel hypoperfusion 2/2 hypotension)   though hypotension responsive to IVF , she remains tachycardic   stool sample testing, ruled out c diff (neg)    new gonorrhea positive   s/p rocephin 1 dose 10/18      anxiety   on Seroquel, saphris at home   oral meds on hold  would get psych to weight in for Saprhis       ADHD  Patient reports 60mg vyvance daily at home. on hold     substance use disorder  pt uses [facility]-   utox positive for thc, amphetamine( takes Vyvanse), opioids  received opioids in ED    hypophosphatemia   phos 2.1--replete and repeat labs in am     dvt ppx lovenox qd

## 2023-10-19 NOTE — PROGRESS NOTE ADULT - SUBJECTIVE AND OBJECTIVE BOX
SUBJECTIVE: Pt seen and examined by chief resident. Pt resting comfortably on bed. Feeling tired and uncomfortable with NGT. Reports that nausea has improved since placement of NGT. Unsure of last time she passed gas, Reports that last BM was this AM but very small.       Vital Signs Last 24 Hrs  T(C): 36.7 (19 Oct 2023 07:08), Max: 37.7 (19 Oct 2023 05:10)  T(F): 98.1 (19 Oct 2023 07:08), Max: 99.8 (19 Oct 2023 05:10)  HR: 115 (19 Oct 2023 07:08) (103 - 115)  BP: 154/97 (19 Oct 2023 07:08) (138/93 - 154/97)  BP(mean): --  RR: 18 (19 Oct 2023 07:08) (17 - 18)  SpO2: 96% (19 Oct 2023 07:08) (96% - 99%)    Parameters below as of 19 Oct 2023 07:08  Patient On (Oxygen Delivery Method): room air        I&O's Summary    18 Oct 2023 07:01  -  19 Oct 2023 07:00  --------------------------------------------------------  IN: 2600 mL / OUT: 1575 mL / NET: 1025 mL        Physical Exam:  General Appearance: Appears well, NAD  Pulmonary: Nonlabored breathing, no respiratory distress  Abdomen: Soft, distended,mildly tender throughout  Extremities: WWP, SCD's in place     LABS:                        11.4   15.78 )-----------( 231      ( 19 Oct 2023 05:30 )             33.4     10-18    137  |  105  |  16  ----------------------------<  100<H>  4.5   |  16<L>  |  0.74    Ca    9.1      18 Oct 2023 05:30  Phos  2.1     10-18  Mg     2.2     10-18    TPro  7.1  /  Alb  2.8<L>  /  TBili  0.8  /  DBili  x   /  AST  31  /  ALT  26  /  AlkPhos  156<H>  10-18      Urinalysis Basic - ( 18 Oct 2023 05:30 )    Color: x / Appearance: x / SG: x / pH: x  Gluc: 100 mg/dL / Ketone: x  / Bili: x / Urobili: x   Blood: x / Protein: x / Nitrite: x   Leuk Esterase: x / RBC: x / WBC x   Sq Epi: x / Non Sq Epi: x / Bacteria: x

## 2023-10-19 NOTE — PROGRESS NOTE ADULT - SUBJECTIVE AND OBJECTIVE BOX
Patient is a 48y old  Female who presents with a chief complaint of Abdominal pain with sepsis (19 Oct 2023 07:20)      INTERVAL HPI/OVERNIGHT EVENTS: offers no new complaints; current symptoms resolving    MEDICATIONS  (STANDING):  dextrose 5% + sodium chloride 0.45%. 1000 milliLiter(s) (100 mL/Hr) IV Continuous <Continuous>  enoxaparin Injectable 40 milliGRAM(s) SubCutaneous every 24 hours  folic acid Injectable 1 milliGRAM(s) IV Push daily  influenza   Vaccine 0.5 milliLiter(s) IntraMuscular once  pantoprazole  Injectable 40 milliGRAM(s) IV Push daily  piperacillin/tazobactam IVPB.. 3.375 Gram(s) IV Intermittent every 8 hours  thiamine Injectable 100 milliGRAM(s) IV Push daily    MEDICATIONS  (PRN):  benzocaine/menthol Lozenge 1 Lozenge Oral four times a day PRN Sore Throat  LORazepam   Injectable 0.5 milliGRAM(s) IV Push every 4 hours PRN Anxiety      __________________________________________________  REVIEW OF SYSTEMS:    CONSTITUTIONAL: No fever,   EYES: no acute visual disturbances  NECK: No pain or stiffness  RESPIRATORY: No cough; No shortness of breath  CARDIOVASCULAR: No chest pain, no palpitations  GASTROINTESTINAL: + pain.    NEUROLOGICAL: No headache or numbness, no tremors  MUSCULOSKELETAL: No joint pain, no muscle pain  GENITOURINARY: no dysuria, no frequency, no hesitancy  PSYCHIATRY: no depression , no anxiety  ALL OTHER  ROS negative        Vital Signs Last 24 Hrs  T(C): 36.9 (19 Oct 2023 13:38), Max: 37.7 (19 Oct 2023 05:10)  T(F): 98.4 (19 Oct 2023 13:38), Max: 99.8 (19 Oct 2023 05:10)  HR: 106 (19 Oct 2023 13:38) (103 - 118)  BP: 122/83 (19 Oct 2023 13:38) (122/83 - 154/97)  BP(mean): --  RR: 18 (19 Oct 2023 13:38) (17 - 20)  SpO2: 97% (19 Oct 2023 13:38) (96% - 98%)    Parameters below as of 19 Oct 2023 13:38  Patient On (Oxygen Delivery Method): room air        ________________________________________________  PHYSICAL EXAM:  GENERAL: NAD  HEENT: Normocephalic;  conjunctivae and sclerae clear; moist mucous membranes;   NECK : supple  CHEST/LUNG: Clear to auscultation bilaterally with good air entry   HEART: S1 S2  regular; no murmurs, gallops or rubs  ABDOMEN: Soft, TTP diffusely, + distended; delayed Bowel sounds present  EXTREMITIES: no cyanosis; no edema; no calf tenderness  SKIN: warm and dry; no rash  NERVOUS SYSTEM:  Awake and alert; Oriented  to place, person and time ; no new deficits    _________________________________________________  LABS:                        11.4   15.78 )-----------( 231      ( 19 Oct 2023 05:30 )             33.4     10-19    134<L>  |  100  |  6<L>  ----------------------------<  127<H>  3.0<L>   |  21<L>  |  0.65    Ca    8.8      19 Oct 2023 05:30  Phos  1.4     10-19  Mg     1.8     10-19    TPro  6.6  /  Alb  2.8<L>  /  TBili  0.8  /  DBili  x   /  AST  15  /  ALT  16  /  AlkPhos  152<H>  10-19      Urinalysis Basic - ( 19 Oct 2023 05:30 )    Color: x / Appearance: x / SG: x / pH: x  Gluc: 127 mg/dL / Ketone: x  / Bili: x / Urobili: x   Blood: x / Protein: x / Nitrite: x   Leuk Esterase: x / RBC: x / WBC x   Sq Epi: x / Non Sq Epi: x / Bacteria: x      CAPILLARY BLOOD GLUCOSE            RADIOLOGY & ADDITIONAL TESTS:      Plan of care was discussed with patient and /or primary care giver; all questions and concerns were addressed and care was aligned with patient's wishes.

## 2023-10-19 NOTE — PROGRESS NOTE ADULT - ASSESSMENT
48F with PMHx of HTN, HLD and anxiety and PSHx of remote appendectomy who presents to [facility] for acute-onset abdominal pain this AM which woke her up. Pt initially admitted to the SICU for monitoring and elevated lactate; pt stabilized w/ IVF and subsequently stepped down to telemetry (10/18).     NPO/IVF w/ NG tube   Zosyn  pain/nausea PRN  Ativan 0.5mg PRN  SCD/Lovenox qd  CIWA  F/u final read of CT

## 2023-10-20 LAB
ANION GAP SERPL CALC-SCNC: 10 MMOL/L — SIGNIFICANT CHANGE UP (ref 5–17)
ANION GAP SERPL CALC-SCNC: 10 MMOL/L — SIGNIFICANT CHANGE UP (ref 5–17)
BUN SERPL-MCNC: 6 MG/DL — LOW (ref 7–23)
BUN SERPL-MCNC: 6 MG/DL — LOW (ref 7–23)
CALCIUM SERPL-MCNC: 8.3 MG/DL — LOW (ref 8.4–10.5)
CALCIUM SERPL-MCNC: 8.3 MG/DL — LOW (ref 8.4–10.5)
CHLORIDE SERPL-SCNC: 103 MMOL/L — SIGNIFICANT CHANGE UP (ref 96–108)
CHLORIDE SERPL-SCNC: 103 MMOL/L — SIGNIFICANT CHANGE UP (ref 96–108)
CMV DNA CSF QL NAA+PROBE: SIGNIFICANT CHANGE UP IU/ML
CMV DNA CSF QL NAA+PROBE: SIGNIFICANT CHANGE UP IU/ML
CMV DNA SPEC NAA+PROBE-LOG#: SIGNIFICANT CHANGE UP LOG10IU/ML
CMV DNA SPEC NAA+PROBE-LOG#: SIGNIFICANT CHANGE UP LOG10IU/ML
CO2 SERPL-SCNC: 22 MMOL/L — SIGNIFICANT CHANGE UP (ref 22–31)
CO2 SERPL-SCNC: 22 MMOL/L — SIGNIFICANT CHANGE UP (ref 22–31)
CREAT SERPL-MCNC: 0.55 MG/DL — SIGNIFICANT CHANGE UP (ref 0.5–1.3)
CREAT SERPL-MCNC: 0.55 MG/DL — SIGNIFICANT CHANGE UP (ref 0.5–1.3)
EGFR: 112 ML/MIN/1.73M2 — SIGNIFICANT CHANGE UP
EGFR: 112 ML/MIN/1.73M2 — SIGNIFICANT CHANGE UP
GLUCOSE BLDC GLUCOMTR-MCNC: 107 MG/DL — HIGH (ref 70–99)
GLUCOSE BLDC GLUCOMTR-MCNC: 107 MG/DL — HIGH (ref 70–99)
GLUCOSE SERPL-MCNC: 118 MG/DL — HIGH (ref 70–99)
GLUCOSE SERPL-MCNC: 118 MG/DL — HIGH (ref 70–99)
HCT VFR BLD CALC: 30.5 % — LOW (ref 34.5–45)
HCT VFR BLD CALC: 30.5 % — LOW (ref 34.5–45)
HGB BLD-MCNC: 10.2 G/DL — LOW (ref 11.5–15.5)
HGB BLD-MCNC: 10.2 G/DL — LOW (ref 11.5–15.5)
INR BLD: 1.09 — SIGNIFICANT CHANGE UP (ref 0.85–1.18)
INR BLD: 1.09 — SIGNIFICANT CHANGE UP (ref 0.85–1.18)
MAGNESIUM SERPL-MCNC: 1.9 MG/DL — SIGNIFICANT CHANGE UP (ref 1.6–2.6)
MAGNESIUM SERPL-MCNC: 1.9 MG/DL — SIGNIFICANT CHANGE UP (ref 1.6–2.6)
MCHC RBC-ENTMCNC: 32.2 PG — SIGNIFICANT CHANGE UP (ref 27–34)
MCHC RBC-ENTMCNC: 32.2 PG — SIGNIFICANT CHANGE UP (ref 27–34)
MCHC RBC-ENTMCNC: 33.4 GM/DL — SIGNIFICANT CHANGE UP (ref 32–36)
MCHC RBC-ENTMCNC: 33.4 GM/DL — SIGNIFICANT CHANGE UP (ref 32–36)
MCV RBC AUTO: 96.2 FL — SIGNIFICANT CHANGE UP (ref 80–100)
MCV RBC AUTO: 96.2 FL — SIGNIFICANT CHANGE UP (ref 80–100)
NRBC # BLD: 0 /100 WBCS — SIGNIFICANT CHANGE UP (ref 0–0)
NRBC # BLD: 0 /100 WBCS — SIGNIFICANT CHANGE UP (ref 0–0)
PHOSPHATE SERPL-MCNC: 1.9 MG/DL — LOW (ref 2.5–4.5)
PHOSPHATE SERPL-MCNC: 1.9 MG/DL — LOW (ref 2.5–4.5)
PLATELET # BLD AUTO: 226 K/UL — SIGNIFICANT CHANGE UP (ref 150–400)
PLATELET # BLD AUTO: 226 K/UL — SIGNIFICANT CHANGE UP (ref 150–400)
POTASSIUM SERPL-MCNC: 3.2 MMOL/L — LOW (ref 3.5–5.3)
POTASSIUM SERPL-MCNC: 3.2 MMOL/L — LOW (ref 3.5–5.3)
POTASSIUM SERPL-SCNC: 3.2 MMOL/L — LOW (ref 3.5–5.3)
POTASSIUM SERPL-SCNC: 3.2 MMOL/L — LOW (ref 3.5–5.3)
PROTHROM AB SERPL-ACNC: 12.4 SEC — SIGNIFICANT CHANGE UP (ref 9.5–13)
PROTHROM AB SERPL-ACNC: 12.4 SEC — SIGNIFICANT CHANGE UP (ref 9.5–13)
RBC # BLD: 3.17 M/UL — LOW (ref 3.8–5.2)
RBC # BLD: 3.17 M/UL — LOW (ref 3.8–5.2)
RBC # FLD: 13.2 % — SIGNIFICANT CHANGE UP (ref 10.3–14.5)
RBC # FLD: 13.2 % — SIGNIFICANT CHANGE UP (ref 10.3–14.5)
SODIUM SERPL-SCNC: 135 MMOL/L — SIGNIFICANT CHANGE UP (ref 135–145)
SODIUM SERPL-SCNC: 135 MMOL/L — SIGNIFICANT CHANGE UP (ref 135–145)
WBC # BLD: 11.31 K/UL — HIGH (ref 3.8–10.5)
WBC # BLD: 11.31 K/UL — HIGH (ref 3.8–10.5)
WBC # FLD AUTO: 11.31 K/UL — HIGH (ref 3.8–10.5)
WBC # FLD AUTO: 11.31 K/UL — HIGH (ref 3.8–10.5)

## 2023-10-20 PROCEDURE — 99231 SBSQ HOSP IP/OBS SF/LOW 25: CPT

## 2023-10-20 PROCEDURE — 99233 SBSQ HOSP IP/OBS HIGH 50: CPT

## 2023-10-20 RX ORDER — LISINOPRIL 2.5 MG/1
1 TABLET ORAL
Refills: 0 | DISCHARGE

## 2023-10-20 RX ORDER — FINASTERIDE 5 MG/1
1 TABLET, FILM COATED ORAL
Refills: 0 | DISCHARGE

## 2023-10-20 RX ORDER — QUETIAPINE FUMARATE 200 MG/1
1 TABLET, FILM COATED ORAL
Refills: 0 | DISCHARGE

## 2023-10-20 RX ORDER — QUETIAPINE FUMARATE 200 MG/1
100 TABLET, FILM COATED ORAL DAILY
Refills: 0 | Status: DISCONTINUED | OUTPATIENT
Start: 2023-10-20 | End: 2023-10-23

## 2023-10-20 RX ORDER — ACETAMINOPHEN 500 MG
1000 TABLET ORAL ONCE
Refills: 0 | Status: COMPLETED | OUTPATIENT
Start: 2023-10-20 | End: 2023-10-20

## 2023-10-20 RX ORDER — MAGNESIUM SULFATE 500 MG/ML
1 VIAL (ML) INJECTION ONCE
Refills: 0 | Status: COMPLETED | OUTPATIENT
Start: 2023-10-20 | End: 2023-10-20

## 2023-10-20 RX ORDER — FAMOTIDINE 10 MG/ML
1 INJECTION INTRAVENOUS
Refills: 0 | DISCHARGE

## 2023-10-20 RX ORDER — LISDEXAMFETAMINE DIMESYLATE 70 MG/1
1 CAPSULE ORAL
Refills: 0 | DISCHARGE

## 2023-10-20 RX ORDER — POTASSIUM CHLORIDE 20 MEQ
10 PACKET (EA) ORAL
Refills: 0 | Status: COMPLETED | OUTPATIENT
Start: 2023-10-20 | End: 2023-10-20

## 2023-10-20 RX ORDER — POTASSIUM PHOSPHATE, MONOBASIC POTASSIUM PHOSPHATE, DIBASIC 236; 224 MG/ML; MG/ML
21 INJECTION, SOLUTION INTRAVENOUS ONCE
Refills: 0 | Status: COMPLETED | OUTPATIENT
Start: 2023-10-20 | End: 2023-10-20

## 2023-10-20 RX ORDER — LIDOCAINE 4 G/100G
1 CREAM TOPICAL ONCE
Refills: 0 | Status: COMPLETED | OUTPATIENT
Start: 2023-10-20 | End: 2023-10-20

## 2023-10-20 RX ORDER — MONTELUKAST 4 MG/1
1 TABLET, CHEWABLE ORAL
Refills: 0 | DISCHARGE

## 2023-10-20 RX ORDER — ATORVASTATIN CALCIUM 80 MG/1
1 TABLET, FILM COATED ORAL
Refills: 0 | DISCHARGE

## 2023-10-20 RX ADMIN — Medication 1 MILLIGRAM(S): at 18:46

## 2023-10-20 RX ADMIN — POTASSIUM PHOSPHATE, MONOBASIC POTASSIUM PHOSPHATE, DIBASIC 62.5 MILLIMOLE(S): 236; 224 INJECTION, SOLUTION INTRAVENOUS at 13:10

## 2023-10-20 RX ADMIN — QUETIAPINE FUMARATE 100 MILLIGRAM(S): 200 TABLET, FILM COATED ORAL at 19:30

## 2023-10-20 RX ADMIN — PANTOPRAZOLE SODIUM 40 MILLIGRAM(S): 20 TABLET, DELAYED RELEASE ORAL at 13:10

## 2023-10-20 RX ADMIN — Medication 0.5 MILLIGRAM(S): at 01:48

## 2023-10-20 RX ADMIN — PIPERACILLIN AND TAZOBACTAM 25 GRAM(S): 4; .5 INJECTION, POWDER, LYOPHILIZED, FOR SOLUTION INTRAVENOUS at 02:47

## 2023-10-20 RX ADMIN — Medication 100 GRAM(S): at 09:27

## 2023-10-20 RX ADMIN — Medication 100 MILLIEQUIVALENT(S): at 10:38

## 2023-10-20 RX ADMIN — Medication 100 MILLIEQUIVALENT(S): at 18:47

## 2023-10-20 RX ADMIN — ENOXAPARIN SODIUM 40 MILLIGRAM(S): 100 INJECTION SUBCUTANEOUS at 05:56

## 2023-10-20 RX ADMIN — SODIUM CHLORIDE 100 MILLILITER(S): 9 INJECTION, SOLUTION INTRAVENOUS at 01:55

## 2023-10-20 RX ADMIN — Medication 400 MILLIGRAM(S): at 16:04

## 2023-10-20 RX ADMIN — LIDOCAINE 1 PATCH: 4 CREAM TOPICAL at 19:34

## 2023-10-20 RX ADMIN — Medication 100 MILLIGRAM(S): at 18:46

## 2023-10-20 RX ADMIN — Medication 100 MILLIEQUIVALENT(S): at 13:10

## 2023-10-20 RX ADMIN — Medication 400 MILLIGRAM(S): at 05:56

## 2023-10-20 NOTE — DISCHARGE NOTE PROVIDER - NSDCCPCAREPLAN_GEN_ALL_CORE_FT
PRINCIPAL DISCHARGE DIAGNOSIS  Diagnosis: Small bowel obstruction  Assessment and Plan of Treatment:       SECONDARY DISCHARGE DIAGNOSES  Diagnosis: Enteritis  Assessment and Plan of Treatment:

## 2023-10-20 NOTE — DISCHARGE NOTE PROVIDER - CARE PROVIDER_API CALL
Jessie Bradley  Surgery  [address] [place] [address], [address]  [place] [address]  Phone: [phone]  Fax: [phone]  Follow Up Time:    Carl Hayward  Gastroenterology  [address] [place], [place] [address]  Phone: [phone]  Fax: [phone]  Follow Up Time: 2 weeks

## 2023-10-20 NOTE — CHART NOTE - NSCHARTNOTEFT_GEN_A_CORE
Admitting Diagnosis:   Patient is a 48y old  Female who presents with a chief complaint of Abdominal pain with sepsis (20 Oct 2023 12:14)      PAST MEDICAL & SURGICAL HISTORY:  COVID-19    History of laparoscopic appendectomy      Current Nutrition Order:  Diet, Clear Liquid (10-20-23 @ 12:30)    PO Intake: Good (%) [   ]  Fair (50-75%) [   ] Poor (<25%) [   ] - - see below    GI Issues: Denied N/V/D/C, +BM 10/18. Noted with abd distention.    Pain: None reported during interview. Pt expressed she was feeling "much better" today    Skin Integrity: WDL, no edema or pressure injuries documented at this time. Kamron 19.    Labs:   10-20    135  |  103  |  6<L>  ----------------------------<  118<H>  3.2<L>   |  22  |  0.55    Ca    8.3<L>      20 Oct 2023 07:08  Phos  1.9     10-20  Mg     1.9     10-20    TPro  6.6  /  Alb  2.8<L>  /  TBili  0.8  /  DBili  x   /  AST  15  /  ALT  16  /  AlkPhos  152<H>  10-19    CAPILLARY BLOOD GLUCOSE    POCT Blood Glucose.: 107 mg/dL (20 Oct 2023 07:26)      Medications:  MEDICATIONS  (STANDING):  dextrose 5% + sodium chloride 0.45%. 1000 milliLiter(s) (100 mL/Hr) IV Continuous <Continuous>  enoxaparin Injectable 40 milliGRAM(s) SubCutaneous every 24 hours  folic acid Injectable 1 milliGRAM(s) IV Push daily  influenza   Vaccine 0.5 milliLiter(s) IntraMuscular once  pantoprazole  Injectable 40 milliGRAM(s) IV Push daily  potassium chloride  10 mEq/100 mL IVPB 10 milliEquivalent(s) IV Intermittent every 1 hour  potassium phosphate IVPB 21 milliMole(s) IV Intermittent once  thiamine Injectable 100 milliGRAM(s) IV Push daily    MEDICATIONS  (PRN):  benzocaine/menthol Lozenge 1 Lozenge Oral four times a day PRN Sore Throat  LORazepam   Injectable 0.5 milliGRAM(s) IV Push every 4 hours PRN Anxiety      Dosing Anthropometrics (10/16/23):  Height for BMI (FEET)	5 Feet  Height for BMI (INCHES)	4 Inch(s)  Height for BMI (CENTIMETERS)	162.56 Centimeter(s)  Weight for BMI (lbs)	134.9 lb  Weight for BMI (kg)	61.2 kg  Body Mass Index	23.1    IBW: 54.5kg/120lb   % IBW: 112%    Weight Change:   Daily Weight (10/16/23) via bed scale 68.9kg/151.8lb reflective of +17lb/+12.5% wt change/ ?discrepancy of admission/dosing wt. Recommend to trend weights weekly. RD will continue to monitor as able.    Nutrition Focused Physical Exam: Completed [   ]  Not Pertinent [ x ]    Estimated energy needs: ActualBW (61.2kg) used to calculate estimated needs per Standards of Care given pt within % IBW (112%). Needs estimated for age and adjusted for current clinical status, increased needs for clinical status. **Fluids per team     Estimated Energy Needs From (evaristo/kg)	25  Estimated Energy Needs To (evaristo/kg)	30  Estimated Energy Needs Calculated From (evaristo/kg)	1530  Estimated Energy Needs Calculated To (evaristo/kg)	1836    Estimated Protein Needs From (g/kg)	1.2  Estimated Protein Needs To (g/kg)	1.4  Estimated Protein Needs Calculated From (g/kg)	73  Estimated Protein Needs Calculated To (g/kg)	86    Subjective:   48F with PMHx of HTN, HLD and anxiety and PSHx of remote appendectomy who presents to [facility] for acute-onset abdominal pain this AM which woke her up. Pt initially admitted to the SICU for monitoring and elevated lactate; pt stabilized w/ IVF and subsequently stepped down to telemetry (10/18).     Pt seen by RDN on 9WO for follow-up nutrition assessment. On assessment, pt asleep but arousable. Expressed feeling "much better" today and noted she was starting to feel hungry. At time of assessment, pt NPO x 2days. Had briefly been ordered for a Clear Liquid Diet (10/18) and endorsed being able to tolerate some liquids at that time. Diet advanced again to Clear Liquids this afternoon (10/20). Pt denied N/V/D/C, last BM 10/18, +distention. Please see nutrition recommendations. Pt made aware RDN remains available. Will continue to monitor as able.     Previous Nutrition Diagnosis (updated 10/20): Altered GI Function R/T medical condition/illness AEB abd distention     Active [ x- ongoing ]  Resolved [   ]    Goal:   - Initiate nutrition within 24-48hrs  - Pt will meet at least 75% of protein & energy needs via most appropriate route for nutrition    New Nutrition Diagnosis (10/20): Inadequate Energy Intake R/T medical condition AEB alternating NPO/Clear Liquid Diet x4 days since admission    Goal:   - Continue to advance diet as medically feasible and tolerated by pt. Recommend FLD > Low Fiber Diet   - Pt will meet at least 75% of protein & energy needs via most appropriate route for nutrition    Recommendations:  1. Continue Clear Liquid diet order per team   >> Advance to FLD > Low Fiber Diet as medically feasible   >> Monitor tolerance closely  2. Monitor intake  >> Consistently meet >75% of estimated needs via most appropriate route for nutrition during admission   3. Monitor labs (electrolytes, CMP), wt trends (weekly), GI function, and skin integrity   4. Encourage pt to meet nutritional needs and honor food preferences as able   5. RD to remain available for additional nutrition interventions and diet edu as needed    Education: Discussed potential diet advancement to CLD; pt expressed understanding.    Risk Level: High [ x ] Moderate [   ] Low [   ]
Informed NG tube had accidentally fallen out of place. Informed pt of her diagnosis of partial small bowel obstruction and the current treatment plan of small bowel decompression w/ NG sump and the necessity of placing the tube again. Pt at this time refused placement but said "in a few hours".     At this time, pt denies nausea/vomiting or passing flatus/bowel movements. On exam, abdomen is soft, moderately distended, nontender, nonperitonitic.

## 2023-10-20 NOTE — DISCHARGE NOTE PROVIDER - INSTRUCTIONS
Please self-advance to a regular diet upon discharge depending on how you are tolerating your intake.

## 2023-10-20 NOTE — PROGRESS NOTE ADULT - ASSESSMENT
48F with PMHx of HTN, HLD and anxiety and PSHx of remote appendectomy who presents to [facility] for acute-onset abdominal pain this AM which woke her up. Pt initially admitted to the SICU for monitoring and elevated lactate; pt stabilized w/ IVF and subsequently stepped down to telemetry (10/18).     NPO/IVF  Zosyn  pain/nausea PRN  Ativan 0.5mg PRN  SCD/Lovenox qd  CIWA

## 2023-10-20 NOTE — DISCHARGE NOTE PROVIDER - NSDCFUADDAPPT_GEN_ALL_CORE_FT
Please follow up with Dr. Bradley in one to two weeks; you may call the office to make an appointment at your earliest convenience.

## 2023-10-20 NOTE — DISCHARGE NOTE PROVIDER - NSDCMRMEDTOKEN_GEN_ALL_CORE_FT
famotidine 40 mg oral tablet: 1 tab(s) orally once a day  finasteride 5 mg oral tablet: 1 tab(s) orally once a day  Lipitor 20 mg oral tablet: 1 tab(s) orally once a day  lisinopril 10 mg oral tablet: 1 tab(s) orally once a day  montelukast 10 mg oral tablet: 1 tab(s) orally once a day  Seroquel 100 mg oral tablet: 1 tab(s) orally once a day  Vyvanse 60 mg oral capsule: 1 cap(s) orally once a day

## 2023-10-20 NOTE — PROGRESS NOTE ADULT - SUBJECTIVE AND OBJECTIVE BOX
Patient is a 48y old  Female who presents with a chief complaint of Abdominal pain with sepsis (20 Oct 2023 07:07)      INTERVAL HPI/OVERNIGHT EVENTS: ngt out, states pain has improved     MEDICATIONS  (STANDING):  dextrose 5% + sodium chloride 0.45%. 1000 milliLiter(s) (100 mL/Hr) IV Continuous <Continuous>  enoxaparin Injectable 40 milliGRAM(s) SubCutaneous every 24 hours  folic acid Injectable 1 milliGRAM(s) IV Push daily  influenza   Vaccine 0.5 milliLiter(s) IntraMuscular once  pantoprazole  Injectable 40 milliGRAM(s) IV Push daily  potassium chloride  10 mEq/100 mL IVPB 10 milliEquivalent(s) IV Intermittent every 1 hour  potassium phosphate IVPB 21 milliMole(s) IV Intermittent once  thiamine Injectable 100 milliGRAM(s) IV Push daily    MEDICATIONS  (PRN):  benzocaine/menthol Lozenge 1 Lozenge Oral four times a day PRN Sore Throat  LORazepam   Injectable 0.5 milliGRAM(s) IV Push every 4 hours PRN Anxiety      __________________________________________________  REVIEW OF SYSTEMS:    CONSTITUTIONAL: No fever,   EYES: no acute visual disturbances  NECK: No pain or stiffness  RESPIRATORY: No cough; No shortness of breath  CARDIOVASCULAR: No chest pain, no palpitations  GASTROINTESTINAL: No pain. No nausea or vomiting; No diarrhea   NEUROLOGICAL: No headache or numbness, no tremors  MUSCULOSKELETAL: No joint pain, no muscle pain  GENITOURINARY: no dysuria, no frequency, no hesitancy  PSYCHIATRY: no depression , no anxiety  ALL OTHER  ROS negative        Vital Signs Last 24 Hrs  T(C): 36.9 (20 Oct 2023 10:50), Max: 37.7 (19 Oct 2023 21:02)  T(F): 98.4 (20 Oct 2023 10:50), Max: 99.9 (19 Oct 2023 21:02)  HR: 93 (20 Oct 2023 09:33) (93 - 111)  BP: 112/78 (20 Oct 2023 09:33) (112/78 - 133/93)  BP(mean): --  RR: 18 (20 Oct 2023 09:33) (17 - 19)  SpO2: 97% (20 Oct 2023 09:33) (94% - 98%)    Parameters below as of 20 Oct 2023 09:33  Patient On (Oxygen Delivery Method): room air        ________________________________________________  PHYSICAL EXAM:  GENERAL: NAD  HEENT: Normocephalic;  conjunctivae and sclerae clear; moist mucous membranes;   NECK : supple  CHEST/LUNG: Clear to auscultation bilaterally with good air entry   HEART: S1 S2  regular; no murmurs, gallops or rubs  ABDOMEN: Soft, Nontender, + distended; delayed BS   EXTREMITIES: no cyanosis; no edema; no calf tenderness  SKIN: warm and dry; no rash  NERVOUS SYSTEM:  Awake and alert; Oriented  to place, person and time ; no new deficits    _________________________________________________  LABS:                        10.2   11.31 )-----------( 226      ( 20 Oct 2023 07:08 )             30.5     10-20    135  |  103  |  6<L>  ----------------------------<  118<H>  3.2<L>   |  22  |  0.55    Ca    8.3<L>      20 Oct 2023 07:08  Phos  1.9     10-20  Mg     1.9     10-20    TPro  6.6  /  Alb  2.8<L>  /  TBili  0.8  /  DBili  x   /  AST  15  /  ALT  16  /  AlkPhos  152<H>  10-19    PT/INR - ( 20 Oct 2023 07:08 )   PT: 12.4 sec;   INR: 1.09            Urinalysis Basic - ( 20 Oct 2023 07:08 )    Color: x / Appearance: x / SG: x / pH: x  Gluc: 118 mg/dL / Ketone: x  / Bili: x / Urobili: x   Blood: x / Protein: x / Nitrite: x   Leuk Esterase: x / RBC: x / WBC x   Sq Epi: x / Non Sq Epi: x / Bacteria: x      CAPILLARY BLOOD GLUCOSE      POCT Blood Glucose.: 107 mg/dL (20 Oct 2023 07:26)        RADIOLOGY & ADDITIONAL TESTS:      Plan of care was discussed with patient and /or primary care giver; all questions and concerns were addressed and care was aligned with patient's wishes.

## 2023-10-20 NOTE — DISCHARGE NOTE PROVIDER - HOSPITAL COURSE
Patient is a 48F with PMHx of HTN, HLD and anxiety and PSHx of remote appendectomy who presents to [facility] for acute-onset abdominal pain 10/16 AM which woke her up. States she had intercourse last evening and that this morning she was awoken by sharp, throbbing lower abdominal pain. States she had never had pain like this prior and that pain was associated with chills/sweats, but denied nausea, emesis, SOB, palpitations or syncope. Reports pain was also associated with significant abdominal distention. She thought she had potentially dislodged her IUD after intercourse so she initially presented to her GYN, Dr. Estrada. She was evaluated there and sent into the ED for further evaluation given degree of pain and abdominal distention. In the ED, patient tachycardic and hypotensive requiring fluid resuscitation. Abdomen was soft but moderately distended and diffusely tender with voluntary guarding. CTAP in the ED without obvious pathology other than enteritis of the small bowel. Pt initially admitted to the SICU for monitoring and elevated lactate; pt stabilized w/ IVF and subsequently stepped down to telemetry (10/18). Blood cultures NGTD, lactate down to 1.6. Chlamydia negative, gonorrhea positive, so pt received rocephin x1. Due to continued abdominal distension and tenderness, pt was made NPO and NGT was placed with 300cc of bilious green output. CTAP IV/PO contrast showed partial SBO with transition point. On 10/19, wbc went down to 15 from 17, lactate down to 1, C-diff and stool O&P negative, and abd x-ray with contrast seen in colon. On 10/20, NGT fell out, WBC 11(15), and zosyn dc'ed. Patient now passing gas and bowel movements, no nausea, no vomiting, OOBA, and transitioned to CLD. Her home seroquel was restarted.       ***incomplete 10/20        Patient is a 48F with PMHx of HTN, HLD and anxiety and PSHx of remote appendectomy who presents to [facility] for acute-onset abdominal pain 10/16 AM which woke her up. States she had intercourse last evening and that this morning she was awoken by sharp, throbbing lower abdominal pain. States she had never had pain like this prior and that pain was associated with chills/sweats, but denied nausea, emesis, SOB, palpitations or syncope. Reports pain was also associated with significant abdominal distention. She thought she had potentially dislodged her IUD after intercourse so she initially presented to her GYN, Dr. Estrada. She was evaluated there and sent into the ED for further evaluation given degree of pain and abdominal distention. In the ED, patient tachycardic and hypotensive requiring fluid resuscitation. Abdomen was soft but moderately distended and diffusely tender with voluntary guarding. CTAP in the ED without obvious pathology other than enteritis of the small bowel. Pt initially admitted to the SICU for monitoring and elevated lactate; pt stabilized w/ IVF and subsequently stepped down to telemetry (10/18). Blood cultures NGTD, lactate down to 1.6. Chlamydia negative, gonorrhea positive, so pt received rocephin x1. Due to continued abdominal distension and tenderness, pt was made NPO and NGT was placed with 300cc of bilious green output. CTAP IV/PO contrast showed partial SBO with transition point. On 10/19, wbc went down to 15 from 17, lactate down to 1, C-diff and stool O&P negative, and abd x-ray with contrast seen in colon. On 10/20, NGT fell out, WBC 11(15), and zosyn dc'ed. Patient now passing gas and bowel movements, no nausea, no vomiting, OOBA, and transitioned to CLD. Her home seroquel was restarted. On 10/21, abdominal pain increased so a CT AP w/PO and IV contrast was ordered and showed a partial or resolving SBO w/ persistent ileal thickening suggestive of ileitis. A repeat C. Diff and GI PCR panel came back negative. On 10/22, CTX/Flagyl was ordered given rising WBC and IV Toradal 30q8 for increasing abdominal pain pain. On 10/24, outpatient endoscopy and colonoscopy report from July was obtained showing mild gastritis, polyp in hepatic flexure, and normal terminal ileum. Patient was advanced and tolerating regular diet, now -n-/-v, +f/+bm, OOBA. Antibiotics stopped. Patient deemed safe for discharge with outpatient GI follow-up.

## 2023-10-20 NOTE — DISCHARGE NOTE PROVIDER - NSDCFUADDINST_GEN_ALL_CORE_FT
Warning Signs:  Please call your doctor or nurse practitioner if you experience the following:  *You experience new chest pain, pressure, squeezing or tightness.  *New or worsening cough, shortness of breath, or wheeze.  *If you are vomiting and cannot keep down fluids or your medications.  *You are getting dehydrated due to continued vomiting, diarrhea, or other reasons. Signs of dehydration include dry mouth, rapid heartbeat, or feeling dizzy or faint when standing.  *You see blood or dark/black material when you vomit or have a bowel movement.  *You experience burning when you urinate, have blood in your urine, or experience a discharge.  *Your pain is not improving within 8-12 hours or is not gone within 24 hours. Call or return immediately if your pain is getting worse, changes location, or moves to your chest or back.  *You have shaking chills, or fever greater than 101.5 degrees Fahrenheit or 38 degrees Celsius.  *Any change in your symptoms, or any new symptoms that concern you.   Please follow up with Dr. Hayward and Dr. Michelle outpatient as scheduled.     Warning Signs:  Please call your doctor or nurse practitioner if you experience the following:  *You experience new chest pain, pressure, squeezing or tightness.  *New or worsening cough, shortness of breath, or wheeze.  *If you are vomiting and cannot keep down fluids or your medications.  *You are getting dehydrated due to continued vomiting, diarrhea, or other reasons. Signs of dehydration include dry mouth, rapid heartbeat, or feeling dizzy or faint when standing.  *You see blood or dark/black material when you vomit or have a bowel movement.  *You experience burning when you urinate, have blood in your urine, or experience a discharge.  *Your pain is not improving within 8-12 hours or is not gone within 24 hours. Call or return immediately if your pain is getting worse, changes location, or moves to your chest or back.  *You have shaking chills, or fever greater than 101.5 degrees Fahrenheit or 38 degrees Celsius.  *Any change in your symptoms, or any new symptoms that concern you.

## 2023-10-20 NOTE — PROGRESS NOTE ADULT - SUBJECTIVE AND OBJECTIVE BOX
SUBJECTIVE: Pt seen and examined by chief resident. Pt resting comfortably on bed. Feeling tired but more comfortable without NGT. Still no flatus/BM.       enoxaparin Injectable 40 milliGRAM(s) SubCutaneous every 24 hours  piperacillin/tazobactam IVPB.. 3.375 Gram(s) IV Intermittent every 8 hours      Vital Signs Last 24 Hrs  T(C): 36.9 (20 Oct 2023 05:53), Max: 37.7 (19 Oct 2023 21:02)  T(F): 98.5 (20 Oct 2023 05:53), Max: 99.9 (19 Oct 2023 21:02)  HR: 94 (20 Oct 2023 05:53) (94 - 118)  BP: 118/81 (20 Oct 2023 05:53) (118/81 - 154/97)  BP(mean): --  RR: 19 (20 Oct 2023 05:53) (17 - 20)  SpO2: 96% (20 Oct 2023 05:53) (94% - 98%)    Parameters below as of 20 Oct 2023 05:53  Patient On (Oxygen Delivery Method): room air      I&O's Detail    19 Oct 2023 07:01  -  20 Oct 2023 07:00  --------------------------------------------------------  IN:    dextrose 5% + sodium chloride 0.45%: 1300 mL    IV PiggyBack: 416.5 mL    IV PiggyBack: 250 mL    IV PiggyBack: 400 mL  Total IN: 2366.5 mL    OUT:    Nasogastric/Oral tube (mL): 450 mL    Voided (mL): 1000 mL  Total OUT: 1450 mL    Total NET: 916.5 mL          Physical Exam:  General Appearance: Appears well, NAD  Pulmonary: Nonlabored breathing, no respiratory distress  Abdomen: Soft, distended,mildly tender throughout  Extremities: WWP, SCD's in place         LABS:                        11.4   15.78 )-----------( 231      ( 19 Oct 2023 05:30 )             33.4     10-19    134<L>  |  100  |  6<L>  ----------------------------<  127<H>  3.0<L>   |  21<L>  |  0.65    Ca    8.8      19 Oct 2023 05:30  Phos  1.4     10-19  Mg     1.8     10-19    TPro  6.6  /  Alb  2.8<L>  /  TBili  0.8  /  DBili  x   /  AST  15  /  ALT  16  /  AlkPhos  152<H>  10-19      Urinalysis Basic - ( 19 Oct 2023 05:30 )    Color: x / Appearance: x / SG: x / pH: x  Gluc: 127 mg/dL / Ketone: x  / Bili: x / Urobili: x   Blood: x / Protein: x / Nitrite: x   Leuk Esterase: x / RBC: x / WBC x   Sq Epi: x / Non Sq Epi: x / Bacteria: x        RADIOLOGY & ADDITIONAL STUDIES:

## 2023-10-20 NOTE — PROGRESS NOTE ADULT - ASSESSMENT
47 yo F with PMHx of ADHD, anxiety and PSHx of remote appendectomy who presented with acute onset abdominal pain, admitted for severe sepsis (tachycadria, bandemia to 19%, respiratory rate in the 30's, elevated lactate, source/suspected enteritis admitted for SICU for HD instability now stepped down to surgical service for further management       SBO   NPO W NGT   hyponatremia, hypokalemia , hypophosphatemia   na improved to 135-- k 3.2, phosp 1.9   recommend  replacement potassium and phosp   rest of the care per primary team     severe sepsis -likely source abdomen -enteritis ( diarrhea )   bandemia  initial CT abd /pelvis cw enteritis -- repeat CT w SBO   pelvic US No adnexal mass. No evidence of ovarian torsion. 1.9 cm right ovarian lesion could represent hemorrhagic cyst. IUD   would need close monitoring and resuscitation,   was on BS antibiotics with Zosyn- dc'd   NGTD--fu blood cx    WBC 17 --15k-11k    lactic acidosis resolved after fluid resuscitation 7--> 1.6 ( 2/2 bowel hypoperfusion 2/2 hypotension)   though hypotension responsive to IVF , she remains tachycardic   stool sample neg, ruled out c diff (neg)    new gonorrhea positive   s/p rocephin 1 dose 10/18      anxiety   on Seroquel, saphris at home   oral meds can be resumed when tolerating po meds/diet   would get psych to weight in for Saprhis       ADHD  Patient reports 60mg vyvance daily at home. on hold     substance use disorder  pt uses THC-   utox positive for thc, amphetamine( takes Vyvanse), opioids  received opioids in ED    hypophosphatemia   phos 2.1--1.9 replete and repeat labs in am     dvt ppx lovenox qd

## 2023-10-20 NOTE — SBIRT NOTE ADULT - NSSBIRTUNABLESCR_GEN_A_CORE
Patient reported that she does not use drugs but did endorse alcohol consumption. Patient declined the screen. SW discussed with patient healthy consumptions limits with patient. Patient verbalized understanding and reports that she does not need additional resources at this time./Patient refused

## 2023-10-21 LAB
ANION GAP SERPL CALC-SCNC: 11 MMOL/L — SIGNIFICANT CHANGE UP (ref 5–17)
ANION GAP SERPL CALC-SCNC: 11 MMOL/L — SIGNIFICANT CHANGE UP (ref 5–17)
BUN SERPL-MCNC: 5 MG/DL — LOW (ref 7–23)
BUN SERPL-MCNC: 5 MG/DL — LOW (ref 7–23)
C DIFF GDH STL QL: NEGATIVE — SIGNIFICANT CHANGE UP
C DIFF GDH STL QL: NEGATIVE — SIGNIFICANT CHANGE UP
C DIFF GDH STL QL: SIGNIFICANT CHANGE UP
C DIFF GDH STL QL: SIGNIFICANT CHANGE UP
CALCIUM SERPL-MCNC: 8.7 MG/DL — SIGNIFICANT CHANGE UP (ref 8.4–10.5)
CALCIUM SERPL-MCNC: 8.7 MG/DL — SIGNIFICANT CHANGE UP (ref 8.4–10.5)
CHLORIDE SERPL-SCNC: 102 MMOL/L — SIGNIFICANT CHANGE UP (ref 96–108)
CHLORIDE SERPL-SCNC: 102 MMOL/L — SIGNIFICANT CHANGE UP (ref 96–108)
CO2 SERPL-SCNC: 22 MMOL/L — SIGNIFICANT CHANGE UP (ref 22–31)
CO2 SERPL-SCNC: 22 MMOL/L — SIGNIFICANT CHANGE UP (ref 22–31)
CREAT SERPL-MCNC: 0.56 MG/DL — SIGNIFICANT CHANGE UP (ref 0.5–1.3)
CREAT SERPL-MCNC: 0.56 MG/DL — SIGNIFICANT CHANGE UP (ref 0.5–1.3)
CULTURE RESULTS: SIGNIFICANT CHANGE UP
EGFR: 111 ML/MIN/1.73M2 — SIGNIFICANT CHANGE UP
EGFR: 111 ML/MIN/1.73M2 — SIGNIFICANT CHANGE UP
GI PCR PANEL: SIGNIFICANT CHANGE UP
GI PCR PANEL: SIGNIFICANT CHANGE UP
GLUCOSE SERPL-MCNC: 135 MG/DL — HIGH (ref 70–99)
GLUCOSE SERPL-MCNC: 135 MG/DL — HIGH (ref 70–99)
HCT VFR BLD CALC: 32.6 % — LOW (ref 34.5–45)
HCT VFR BLD CALC: 32.6 % — LOW (ref 34.5–45)
HGB BLD-MCNC: 11 G/DL — LOW (ref 11.5–15.5)
HGB BLD-MCNC: 11 G/DL — LOW (ref 11.5–15.5)
MAGNESIUM SERPL-MCNC: 2 MG/DL — SIGNIFICANT CHANGE UP (ref 1.6–2.6)
MAGNESIUM SERPL-MCNC: 2 MG/DL — SIGNIFICANT CHANGE UP (ref 1.6–2.6)
MCHC RBC-ENTMCNC: 31.4 PG — SIGNIFICANT CHANGE UP (ref 27–34)
MCHC RBC-ENTMCNC: 31.4 PG — SIGNIFICANT CHANGE UP (ref 27–34)
MCHC RBC-ENTMCNC: 33.7 GM/DL — SIGNIFICANT CHANGE UP (ref 32–36)
MCHC RBC-ENTMCNC: 33.7 GM/DL — SIGNIFICANT CHANGE UP (ref 32–36)
MCV RBC AUTO: 93.1 FL — SIGNIFICANT CHANGE UP (ref 80–100)
MCV RBC AUTO: 93.1 FL — SIGNIFICANT CHANGE UP (ref 80–100)
NRBC # BLD: 0 /100 WBCS — SIGNIFICANT CHANGE UP (ref 0–0)
NRBC # BLD: 0 /100 WBCS — SIGNIFICANT CHANGE UP (ref 0–0)
PHOSPHATE SERPL-MCNC: 2.4 MG/DL — LOW (ref 2.5–4.5)
PHOSPHATE SERPL-MCNC: 2.4 MG/DL — LOW (ref 2.5–4.5)
PLATELET # BLD AUTO: 332 K/UL — SIGNIFICANT CHANGE UP (ref 150–400)
PLATELET # BLD AUTO: 332 K/UL — SIGNIFICANT CHANGE UP (ref 150–400)
POTASSIUM SERPL-MCNC: 3.1 MMOL/L — LOW (ref 3.5–5.3)
POTASSIUM SERPL-MCNC: 3.1 MMOL/L — LOW (ref 3.5–5.3)
POTASSIUM SERPL-SCNC: 3.1 MMOL/L — LOW (ref 3.5–5.3)
POTASSIUM SERPL-SCNC: 3.1 MMOL/L — LOW (ref 3.5–5.3)
RBC # BLD: 3.5 M/UL — LOW (ref 3.8–5.2)
RBC # BLD: 3.5 M/UL — LOW (ref 3.8–5.2)
RBC # FLD: 13.2 % — SIGNIFICANT CHANGE UP (ref 10.3–14.5)
RBC # FLD: 13.2 % — SIGNIFICANT CHANGE UP (ref 10.3–14.5)
SODIUM SERPL-SCNC: 135 MMOL/L — SIGNIFICANT CHANGE UP (ref 135–145)
SODIUM SERPL-SCNC: 135 MMOL/L — SIGNIFICANT CHANGE UP (ref 135–145)
SPECIMEN SOURCE: SIGNIFICANT CHANGE UP
WBC # BLD: 14.94 K/UL — HIGH (ref 3.8–10.5)
WBC # BLD: 14.94 K/UL — HIGH (ref 3.8–10.5)
WBC # FLD AUTO: 14.94 K/UL — HIGH (ref 3.8–10.5)
WBC # FLD AUTO: 14.94 K/UL — HIGH (ref 3.8–10.5)

## 2023-10-21 PROCEDURE — 99222 1ST HOSP IP/OBS MODERATE 55: CPT | Mod: GC

## 2023-10-21 PROCEDURE — 99233 SBSQ HOSP IP/OBS HIGH 50: CPT

## 2023-10-21 PROCEDURE — 74177 CT ABD & PELVIS W/CONTRAST: CPT | Mod: 26

## 2023-10-21 RX ORDER — SODIUM,POTASSIUM PHOSPHATES 278-250MG
1 POWDER IN PACKET (EA) ORAL ONCE
Refills: 0 | Status: COMPLETED | OUTPATIENT
Start: 2023-10-21 | End: 2023-10-21

## 2023-10-21 RX ORDER — POTASSIUM PHOSPHATE, MONOBASIC POTASSIUM PHOSPHATE, DIBASIC 236; 224 MG/ML; MG/ML
15 INJECTION, SOLUTION INTRAVENOUS ONCE
Refills: 0 | Status: DISCONTINUED | OUTPATIENT
Start: 2023-10-21 | End: 2023-10-21

## 2023-10-21 RX ORDER — POTASSIUM CHLORIDE 20 MEQ
40 PACKET (EA) ORAL EVERY 4 HOURS
Refills: 0 | Status: COMPLETED | OUTPATIENT
Start: 2023-10-21 | End: 2023-10-21

## 2023-10-21 RX ORDER — POTASSIUM CHLORIDE 20 MEQ
10 PACKET (EA) ORAL
Refills: 0 | Status: DISCONTINUED | OUTPATIENT
Start: 2023-10-21 | End: 2023-10-21

## 2023-10-21 RX ORDER — DIATRIZOATE MEGLUMINE 180 MG/ML
30 INJECTION, SOLUTION INTRAVESICAL ONCE
Refills: 0 | Status: COMPLETED | OUTPATIENT
Start: 2023-10-21 | End: 2023-10-21

## 2023-10-21 RX ORDER — ACETAMINOPHEN 500 MG
1000 TABLET ORAL ONCE
Refills: 0 | Status: COMPLETED | OUTPATIENT
Start: 2023-10-21 | End: 2023-10-21

## 2023-10-21 RX ADMIN — DIATRIZOATE MEGLUMINE 30 MILLILITER(S): 180 INJECTION, SOLUTION INTRAVESICAL at 09:25

## 2023-10-21 RX ADMIN — Medication 0.5 MILLIGRAM(S): at 10:43

## 2023-10-21 RX ADMIN — Medication 1 MILLIGRAM(S): at 19:43

## 2023-10-21 RX ADMIN — Medication 400 MILLIGRAM(S): at 19:42

## 2023-10-21 RX ADMIN — Medication 1 PACKET(S): at 16:20

## 2023-10-21 RX ADMIN — Medication 0.5 MILLIGRAM(S): at 16:20

## 2023-10-21 RX ADMIN — LIDOCAINE 1 PATCH: 4 CREAM TOPICAL at 07:25

## 2023-10-21 RX ADMIN — Medication 1000 MILLIGRAM(S): at 20:23

## 2023-10-21 RX ADMIN — Medication 100 MILLIGRAM(S): at 16:34

## 2023-10-21 RX ADMIN — Medication 40 MILLIEQUIVALENT(S): at 21:46

## 2023-10-21 RX ADMIN — PANTOPRAZOLE SODIUM 40 MILLIGRAM(S): 20 TABLET, DELAYED RELEASE ORAL at 19:43

## 2023-10-21 RX ADMIN — Medication 400 MILLIGRAM(S): at 09:15

## 2023-10-21 RX ADMIN — Medication 1000 MILLIGRAM(S): at 09:45

## 2023-10-21 RX ADMIN — Medication 40 MILLIEQUIVALENT(S): at 17:30

## 2023-10-21 RX ADMIN — ENOXAPARIN SODIUM 40 MILLIGRAM(S): 100 INJECTION SUBCUTANEOUS at 05:46

## 2023-10-21 RX ADMIN — QUETIAPINE FUMARATE 100 MILLIGRAM(S): 200 TABLET, FILM COATED ORAL at 20:05

## 2023-10-21 NOTE — CONSULT NOTE ADULT - ATTENDING COMMENTS
Patient seen, examined, and discussed with Dr. Mak. Agree with above. 48F with a h/o HTN, HLD and anxiety and PSHx of remote appendectomy, who first presented for abdominal pain after sexual intercourse. Admitted to surgery for treatment of SBO, which has since resolved. Continues to have abdominal pain and diarrhea, so GI consulted due to possible concern for IBD. Patient's initial presentation and prior GI history argues strongly against IBD. Despite negative stool studies, would still argue that this is an infectious source. Received 4-5 days of IV zosyn, which is reasonable for her initial findings of bandemia, elevated lactate. Some of her diarrhea could be related to abx, which have been stopped now. She should continue to improve slowly with supportive measures.     Benton Rincon MD  Gastroenterology

## 2023-10-21 NOTE — CONSULT NOTE ADULT - CONSULT REQUESTED DATE/TIME
16-Oct-2023 22:30
17-Oct-2023 11:04
18-Oct-2023 16:34
21-Oct-2023 18:30
17-Oct-2023 15:41
16-Oct-2023 23:46

## 2023-10-21 NOTE — CONSULT NOTE ADULT - TIME BILLING
Time spent includes direct patient care  (interview and examination of patient), discussion with other providers, support staff and/or patient's family members, review of medical records, ordering diagnostic tests and analyzing results, and documentation.
Moderate complexity, please see above for the rest of the details.

## 2023-10-21 NOTE — PROGRESS NOTE ADULT - SUBJECTIVE AND OBJECTIVE BOX
Patient is a 48y old  Female who presents with a chief complaint of Abdominal pain with sepsis (21 Oct 2023 08:34)      INTERVAL HPI/OVERNIGHT EVENTS:+ abdominal pain , diarrhea and elevated wbc     MEDICATIONS  (STANDING):  enoxaparin Injectable 40 milliGRAM(s) SubCutaneous every 24 hours  folic acid Injectable 1 milliGRAM(s) IV Push daily  influenza   Vaccine 0.5 milliLiter(s) IntraMuscular once  pantoprazole  Injectable 40 milliGRAM(s) IV Push daily  potassium chloride  10 mEq/100 mL IVPB 10 milliEquivalent(s) IV Intermittent every 1 hour  potassium phosphate IVPB 15 milliMole(s) IV Intermittent once  QUEtiapine 100 milliGRAM(s) Oral daily  thiamine Injectable 100 milliGRAM(s) IV Push daily    MEDICATIONS  (PRN):  benzocaine/menthol Lozenge 1 Lozenge Oral four times a day PRN Sore Throat  LORazepam   Injectable 0.5 milliGRAM(s) IV Push every 4 hours PRN Anxiety      __________________________________________________  REVIEW OF SYSTEMS:    CONSTITUTIONAL: No fever,   EYES: no acute visual disturbances  NECK: No pain or stiffness  RESPIRATORY: No cough; No shortness of breath  CARDIOVASCULAR: No chest pain, no palpitations  GASTROINTESTINAL: +pain and diarrhea   NEUROLOGICAL: No headache or numbness, no tremors  MUSCULOSKELETAL: No joint pain, no muscle pain  GENITOURINARY: no dysuria, no frequency, no hesitancy  PSYCHIATRY: no depression , no anxiety  ALL OTHER  ROS negative        Vital Signs Last 24 Hrs  T(C): 37.5 (21 Oct 2023 08:32), Max: 37.6 (20 Oct 2023 20:07)  T(F): 99.5 (21 Oct 2023 08:32), Max: 99.6 (20 Oct 2023 20:07)  HR: 110 (21 Oct 2023 08:32) (88 - 118)  BP: 132/80 (21 Oct 2023 08:32) (114/66 - 132/80)  BP(mean): --  RR: 16 (21 Oct 2023 08:32) (14 - 18)  SpO2: 95% (21 Oct 2023 08:32) (94% - 98%)    Parameters below as of 21 Oct 2023 08:32  Patient On (Oxygen Delivery Method): room air        ________________________________________________  PHYSICAL EXAM:  GENERAL: NAD  HEENT: Normocephalic;  conjunctivae and sclerae clear; moist mucous membranes;   NECK : supple  CHEST/LUNG: Clear to auscultation bilaterally with good air entry   HEART: S1 S2  regular; no murmurs, gallops or rubs  ABDOMEN: Soft, TTP, + distended   EXTREMITIES: no cyanosis; no edema; no calf tenderness  SKIN: warm and dry; no rash  NERVOUS SYSTEM:  Awake and alert; Oriented  to place, person and time ; no new deficits    _________________________________________________  LABS:                        11.0   14.94 )-----------( 332      ( 21 Oct 2023 05:09 )             32.6     10-21    135  |  102  |  5<L>  ----------------------------<  135<H>  3.1<L>   |  22  |  0.56    Ca    8.7      21 Oct 2023 05:09  Phos  2.4     10-21  Mg     2.0     10-21      PT/INR - ( 20 Oct 2023 07:08 )   PT: 12.4 sec;   INR: 1.09            Urinalysis Basic - ( 21 Oct 2023 05:09 )    Color: x / Appearance: x / SG: x / pH: x  Gluc: 135 mg/dL / Ketone: x  / Bili: x / Urobili: x   Blood: x / Protein: x / Nitrite: x   Leuk Esterase: x / RBC: x / WBC x   Sq Epi: x / Non Sq Epi: x / Bacteria: x      CAPILLARY BLOOD GLUCOSE            RADIOLOGY & ADDITIONAL TESTS:      Plan of care was discussed with patient and /or primary care giver; all questions and concerns were addressed and care was aligned with patient's wishes.

## 2023-10-21 NOTE — PROGRESS NOTE ADULT - ASSESSMENT
49 yo F with PMHx of ADHD, anxiety and PSHx of remote appendectomy who presented with acute onset abdominal pain, admitted for severe sepsis (tachycadria, bandemia to 19%, respiratory rate in the 30's, elevated lactate, source/suspected enteritis admitted for SICU for HD instability now stepped down to surgical service for further management       SBO   hyponatremia, hypokalemia , hypophosphatemia   na improved to 135-- k 3.1, phosp 2.4  recommend  to continue replacement potassium and phosp   rest of the care per primary team     severe sepsis -likely source abdomen -enteritis ( diarrhea )   bandemia  initial CT abd /pelvis cw enteritis -- repeat CT w SBO   stool sample O/P neg, ruled out c diff (neg) on admission   was on BS antibiotics with Zosyn- dc'd - now developed diarrhea and elevated wbc + worsening abdominal pain ---- recommend to repeat C diff pcr and send GI PCR   NGTD--fu blood cx    WBC 17 --15k-11k  --- 15k   lactic acidosis resolved after fluid resuscitation 7--> 1.6 ( 2/2 bowel hypoperfusion 2/2 hypotension)   though hypotension responsive to IVF , she remains tachycardic     new gonorrhea positive   s/p rocephin IM 1 dose 10/18      anxiety   on Seroquel, saphris at home   oral meds can be resumed when tolerating po meds/diet   would get psych to weight in for Saprhis       ADHD  Patient reports 60mg vyvance daily at home. on hold     substance use disorder  pt uses THC-   utox positive for thc, amphetamine( takes Vyvanse), opioids  received opioids in ED      dvt ppx lovenox qd

## 2023-10-21 NOTE — CONSULT NOTE ADULT - REASON FOR ADMISSION
Abdominal pain with sepsis

## 2023-10-21 NOTE — CONSULT NOTE ADULT - SUBJECTIVE AND OBJECTIVE BOX
Initial GI Consult Note:     48F with PMH of HTN, HLD and anxiety and PSHx of remote appendectomy, who first presented for abdominal pain after sexual intercourse. Upon arrival found to be tachycardic and hypotensive with a temp of 100.3 F. Labs notable for mild leukocytosis with bands to 14.7. Lactic acidosis to 7 (down to 5.6 s/p 3L crystalloid). Total hyperbilirubinemia to 2.5 with mild transaminitis (AST > ALT 2:1) which patient reports is baseline. CT A/P suggestive of enteritis, otherwise no acute pathologic process. Patient was admitted to surgery and treated for SBO with NG decompression. Her symptoms gradually improved and patient was able to tolerate diet. Today, however, she started having 4 episodes of diarrhea and worsening abdominal pain. GI consulted due to concern for possible IBD.     Patient seen and examined at bedside. Reports that she has never had abdominal issues or pain before. Recently underwent colonoscopy with Dr. Michelle at [facility], which showed a small polyp. Patient was planned to have it removed with Dr. Hayward in September but developed gout, so her repeat colonoscopy has been pushed to October. Initial colonoscopy did not show any evidence of IBD. Patient currently complains of significant anxiety, some abdominal pain/bloating, and diarrhea. States that she underwent a course of abx x2 a few weeks ago for a UTI that did not respond to initial treatment. Drinks 3 glasses of wine daily. Denies any melena, hematochezia, or hematemesis.       OVERNIGHT EVENTS:    SUBJECTIVE / INTERVAL HPI: Patient seen and examined at bedside.     VITAL SIGNS:  Vital Signs Last 24 Hrs  T(C): 37.2 (21 Oct 2023 16:42), Max: 37.6 (20 Oct 2023 20:07)  T(F): 98.9 (21 Oct 2023 16:42), Max: 99.6 (20 Oct 2023 20:07)  HR: 105 (21 Oct 2023 16:42) (86 - 118)  BP: 123/87 (21 Oct 2023 16:42) (114/66 - 132/80)  BP(mean): --  RR: 17 (21 Oct 2023 16:42) (14 - 18)  SpO2: 97% (21 Oct 2023 16:42) (94% - 98%)    Parameters below as of 21 Oct 2023 16:42  Patient On (Oxygen Delivery Method): room air        10-20-23 @ 07:01  -  10-21-23 @ 07:00  --------------------------------------------------------  IN: 1200 mL / OUT: 403 mL / NET: 797 mL    10-21-23 @ 07:01  -  10-21-23 @ 18:35  --------------------------------------------------------  IN: 0 mL / OUT: 250 mL / NET: -250 mL      PHYSICAL EXAM:  General: No acute distress, anxious, emotionally upset   Lungs: Normal respiratory effort and no intercostal retractions  Cardiovascular: RRR  Abdomen: Soft, distended but soft, tender do palpation diffusely   Neurological: Alert and oriented x3  Skin: Warm and dry. No obvious rash     MEDICATIONS:  MEDICATIONS  (STANDING):  enoxaparin Injectable 40 milliGRAM(s) SubCutaneous every 24 hours  folic acid Injectable 1 milliGRAM(s) IV Push daily  influenza   Vaccine 0.5 milliLiter(s) IntraMuscular once  pantoprazole  Injectable 40 milliGRAM(s) IV Push daily  potassium chloride    Tablet ER 40 milliEquivalent(s) Oral every 4 hours  QUEtiapine 100 milliGRAM(s) Oral daily  thiamine Injectable 100 milliGRAM(s) IV Push daily    MEDICATIONS  (PRN):  benzocaine/menthol Lozenge 1 Lozenge Oral four times a day PRN Sore Throat  LORazepam   Injectable 0.5 milliGRAM(s) IV Push every 4 hours PRN Anxiety      ALLERGIES:  Allergies    No Known Allergies    Intolerances        LABS:                        11.0   14.94 )-----------( 332      ( 21 Oct 2023 05:09 )             32.6     10-21    135  |  102  |  5<L>  ----------------------------<  135<H>  3.1<L>   |  22  |  0.56    Ca    8.7      21 Oct 2023 05:09  Phos  2.4     10-21  Mg     2.0     10-21      PT/INR - ( 20 Oct 2023 07:08 )   PT: 12.4 sec;   INR: 1.09            Urinalysis Basic - ( 21 Oct 2023 05:09 )    Color: x / Appearance: x / SG: x / pH: x  Gluc: 135 mg/dL / Ketone: x  / Bili: x / Urobili: x   Blood: x / Protein: x / Nitrite: x   Leuk Esterase: x / RBC: x / WBC x   Sq Epi: x / Non Sq Epi: x / Bacteria: x      CAPILLARY BLOOD GLUCOSE      POCT Blood Glucose.: 107 mg/dL (20 Oct 2023 07:26)        RADIOLOGY & ADDITIONAL TESTS: Reviewed.    ASSESSMENT:    PLAN:

## 2023-10-21 NOTE — PROGRESS NOTE ADULT - SUBJECTIVE AND OBJECTIVE BOX
10/21: IV tylenol for pain  ON: -n/-v, -f/+bm,   10/20: WBC 11(15), dc'ed zosyn, CLD, restarted seroquel (pt doesn't have vyvanse with her to restart, pharmacy doesn't carry it), CMV neg, +f/+3 BM, -n/-v, IVHL       SUBJECTIVE: Patient seen and examined bedside by Surgical resident. Seen laying in bed this morning, denies any n/v, +f/ bms x 3, states that the b/l abdominal pain is about the same as yesterday.     enoxaparin Injectable 40 milliGRAM(s) SubCutaneous every 24 hours    MEDICATIONS  (PRN):  benzocaine/menthol Lozenge 1 Lozenge Oral four times a day PRN Sore Throat  LORazepam   Injectable 0.5 milliGRAM(s) IV Push every 4 hours PRN Anxiety      I&O's Detail    20 Oct 2023 07:01  -  21 Oct 2023 07:00  --------------------------------------------------------  IN:    dextrose 5% + sodium chloride 0.45%: 400 mL    IV PiggyBack: 250 mL    IV PiggyBack: 300 mL    IV PiggyBack: 250 mL  Total IN: 1200 mL    OUT:    Voided (mL): 403 mL  Total OUT: 403 mL    Total NET: 797 mL      21 Oct 2023 07:01  -  21 Oct 2023 08:35  --------------------------------------------------------  IN:  Total IN: 0 mL    OUT:    Voided (mL): 250 mL  Total OUT: 250 mL    Total NET: -250 mL          Vital Signs Last 24 Hrs  T(C): 37.5 (21 Oct 2023 08:32), Max: 37.6 (20 Oct 2023 20:07)  T(F): 99.5 (21 Oct 2023 08:32), Max: 99.6 (20 Oct 2023 20:07)  HR: 110 (21 Oct 2023 08:32) (88 - 118)  BP: 132/80 (21 Oct 2023 08:32) (112/78 - 132/80)  BP(mean): --  RR: 16 (21 Oct 2023 08:32) (14 - 18)  SpO2: 95% (21 Oct 2023 08:32) (94% - 98%)    Parameters below as of 21 Oct 2023 08:32  Patient On (Oxygen Delivery Method): room air        General: NAD, resting comfortably in bed  C/V: NSR  Pulm: Nonlabored breathing, no respiratory distress  Abd: mTTP in b/l lower quadrants, moderate distension.   Extrem: WWP, no edema, SCDs in place    LABS:                        11.0   14.94 )-----------( 332      ( 21 Oct 2023 05:09 )             32.6     10-21    135  |  102  |  5<L>  ----------------------------<  135<H>  3.1<L>   |  22  |  0.56    Ca    8.7      21 Oct 2023 05:09  Phos  2.4     10-21  Mg     2.0     10-21      PT/INR - ( 20 Oct 2023 07:08 )   PT: 12.4 sec;   INR: 1.09            Urinalysis Basic - ( 21 Oct 2023 05:09 )    Color: x / Appearance: x / SG: x / pH: x  Gluc: 135 mg/dL / Ketone: x  / Bili: x / Urobili: x   Blood: x / Protein: x / Nitrite: x   Leuk Esterase: x / RBC: x / WBC x   Sq Epi: x / Non Sq Epi: x / Bacteria: x        RADIOLOGY & ADDITIONAL STUDIES:  CT Abdomen and Pelvis w/ Oral Cont and w/ IV Cont:   ACC: [id number] EXAM:  CT ABDOMEN AND PELVIS OC IC   ORDERED BY: ASMITA MCDANIEL     PROCEDURE DATE:  10/19/2023          INTERPRETATION:  CLINICAL INFORMATION: Admitted for sepsis secondary to   intra-abdominal abscess; now distended.    COMPARISON: CT ofabdomen pelvis from 10/16/2023..    CONTRAST/COMPLICATIONS:  IV Contrast: Isovue 370  95 cc administered   5 cc discarded  Oral Contrast: Omnipaque 300  Complications: None reported at time of study completion    PROCEDURE:  CT of the Abdomen and Pelvis was performed.  Sagittal and coronal reformats were performed.    FINDINGS:  LOWER CHEST: Small right pleural effusion. Unchanged bilateral basilar   dependent atelectasis and groundglass opacities (right greater than   left), which may represent aspiration. Redemonstrated bilateral silicone   breast implants.    LIVER: Redemonstrated hepatomegaly and hepatic steatosis. Redemonstrated   hypertrophied caudate lobe with varices in the upper abdomen. These   findings are suggestive of liver cirrhosis  BILE DUCTS: Normal caliber.  GALLBLADDER: Within normal limits.  SPLEEN: Within normal limits.  PANCREAS: Within normal limits.  ADRENALS: Within normal limits.  KIDNEYS/URETERS: Within normal limits.    BLADDER: Within normal limits.  REPRODUCTIVE ORGANS: Redemonstrated intrauterine device within the   uterus. Unremarkable adnexa.    BOWEL: Multiple dilated loops of small bowel bowel with a maximum caliber   of up to 3.5 cm with a transition point in the right lower abdomen   (series 3; image 96 and series 5; image 44) in the region of previously   demonstrated ileoileal anastomosis (CT abdomen pelvis from 10/16/2023;   series 3, image 127). Enteric contrast is noted down to the level of the   rectum. These findings are consistent with partial small bowel   obstruction. Redemonstrated is distal ileal mural thickening, consistent   with enteritis. Partially visualized enteric tube in the esophagus with   distal tip terminating in a distended stomach. No bowel obstruction.   Status post appendectomy.  PERITONEUM: There is a small amount of complex (average density of 20 HU)   pelvic fluid (right greater than left; series 3, image 116 and series 5;   image 45) and right paracolic gutter fluid. This may represent exudative   ascitesin the setting of enteritis.  VESSELS: Within normal limits.  RETROPERITONEUM/LYMPH NODES: No lymphadenopathy.  ABDOMINAL WALL: Within normal limits.  BONES: Stable L4-5 posterior fusion.    IMPRESSION:    1. Partial small bowel obstruction with the transition point in the right   lower abdomen in the region of the previously demonstrated ileoileal   anastomosis and appendectomy. Redemonstrated ileitis.    2. Hepatomegaly and hepatic steatosis with cirrhotic appearing liver.    3. Small amount ofcomplex right pelvic fluid in the region of the   transition point and in the right paracolic gutter, which may represent   exudative ascites secondary to enteritis. Recommend clinical correlation.    4. Ill-defined bibasilar (right greater than left) groundglass opacities,   which may represent aspiration. Recommend clinical correlation. Small   right pleural effusion.      Findings were discussed with Dr. ASMITA MCDANIEL [phone] 10/19/2023 5:15 AM   by Dr. Fritz with read back confirmation.    --- End of Report ---          REENA FRITZ MD; Resident Radiologist  This document has been electronically signed.  CAROL CARBAJAL MD; Attending Radiologist  This document has been electronically signed. Oct 19 2023  8:13AM (10-19-23 @ 04:24)

## 2023-10-22 LAB
ANION GAP SERPL CALC-SCNC: 13 MMOL/L — SIGNIFICANT CHANGE UP (ref 5–17)
ANION GAP SERPL CALC-SCNC: 13 MMOL/L — SIGNIFICANT CHANGE UP (ref 5–17)
BUN SERPL-MCNC: 5 MG/DL — LOW (ref 7–23)
BUN SERPL-MCNC: 5 MG/DL — LOW (ref 7–23)
CALCIUM SERPL-MCNC: 8.7 MG/DL — SIGNIFICANT CHANGE UP (ref 8.4–10.5)
CALCIUM SERPL-MCNC: 8.7 MG/DL — SIGNIFICANT CHANGE UP (ref 8.4–10.5)
CHLORIDE SERPL-SCNC: 107 MMOL/L — SIGNIFICANT CHANGE UP (ref 96–108)
CHLORIDE SERPL-SCNC: 107 MMOL/L — SIGNIFICANT CHANGE UP (ref 96–108)
CO2 SERPL-SCNC: 18 MMOL/L — LOW (ref 22–31)
CO2 SERPL-SCNC: 18 MMOL/L — LOW (ref 22–31)
CREAT SERPL-MCNC: 0.53 MG/DL — SIGNIFICANT CHANGE UP (ref 0.5–1.3)
CREAT SERPL-MCNC: 0.53 MG/DL — SIGNIFICANT CHANGE UP (ref 0.5–1.3)
EGFR: 113 ML/MIN/1.73M2 — SIGNIFICANT CHANGE UP
EGFR: 113 ML/MIN/1.73M2 — SIGNIFICANT CHANGE UP
GLUCOSE SERPL-MCNC: 135 MG/DL — HIGH (ref 70–99)
GLUCOSE SERPL-MCNC: 135 MG/DL — HIGH (ref 70–99)
HCT VFR BLD CALC: 35.6 % — SIGNIFICANT CHANGE UP (ref 34.5–45)
HCT VFR BLD CALC: 35.6 % — SIGNIFICANT CHANGE UP (ref 34.5–45)
HGB BLD-MCNC: 10.9 G/DL — LOW (ref 11.5–15.5)
HGB BLD-MCNC: 10.9 G/DL — LOW (ref 11.5–15.5)
MAGNESIUM SERPL-MCNC: 1.9 MG/DL — SIGNIFICANT CHANGE UP (ref 1.6–2.6)
MAGNESIUM SERPL-MCNC: 1.9 MG/DL — SIGNIFICANT CHANGE UP (ref 1.6–2.6)
MCHC RBC-ENTMCNC: 30.6 GM/DL — LOW (ref 32–36)
MCHC RBC-ENTMCNC: 30.6 GM/DL — LOW (ref 32–36)
MCHC RBC-ENTMCNC: 32.2 PG — SIGNIFICANT CHANGE UP (ref 27–34)
MCHC RBC-ENTMCNC: 32.2 PG — SIGNIFICANT CHANGE UP (ref 27–34)
MCV RBC AUTO: 105 FL — HIGH (ref 80–100)
MCV RBC AUTO: 105 FL — HIGH (ref 80–100)
NRBC # BLD: 0 /100 WBCS — SIGNIFICANT CHANGE UP (ref 0–0)
NRBC # BLD: 0 /100 WBCS — SIGNIFICANT CHANGE UP (ref 0–0)
PHOSPHATE SERPL-MCNC: 2.5 MG/DL — SIGNIFICANT CHANGE UP (ref 2.5–4.5)
PHOSPHATE SERPL-MCNC: 2.5 MG/DL — SIGNIFICANT CHANGE UP (ref 2.5–4.5)
PLATELET # BLD AUTO: 295 K/UL — SIGNIFICANT CHANGE UP (ref 150–400)
PLATELET # BLD AUTO: 295 K/UL — SIGNIFICANT CHANGE UP (ref 150–400)
POTASSIUM SERPL-MCNC: 4.2 MMOL/L — SIGNIFICANT CHANGE UP (ref 3.5–5.3)
POTASSIUM SERPL-MCNC: 4.2 MMOL/L — SIGNIFICANT CHANGE UP (ref 3.5–5.3)
POTASSIUM SERPL-SCNC: 4.2 MMOL/L — SIGNIFICANT CHANGE UP (ref 3.5–5.3)
POTASSIUM SERPL-SCNC: 4.2 MMOL/L — SIGNIFICANT CHANGE UP (ref 3.5–5.3)
RBC # BLD: 3.39 M/UL — LOW (ref 3.8–5.2)
RBC # BLD: 3.39 M/UL — LOW (ref 3.8–5.2)
RBC # FLD: 14.4 % — SIGNIFICANT CHANGE UP (ref 10.3–14.5)
RBC # FLD: 14.4 % — SIGNIFICANT CHANGE UP (ref 10.3–14.5)
SODIUM SERPL-SCNC: 138 MMOL/L — SIGNIFICANT CHANGE UP (ref 135–145)
SODIUM SERPL-SCNC: 138 MMOL/L — SIGNIFICANT CHANGE UP (ref 135–145)
WBC # BLD: 14.62 K/UL — HIGH (ref 3.8–10.5)
WBC # BLD: 14.62 K/UL — HIGH (ref 3.8–10.5)
WBC # FLD AUTO: 14.62 K/UL — HIGH (ref 3.8–10.5)
WBC # FLD AUTO: 14.62 K/UL — HIGH (ref 3.8–10.5)

## 2023-10-22 PROCEDURE — 99232 SBSQ HOSP IP/OBS MODERATE 35: CPT | Mod: GC

## 2023-10-22 PROCEDURE — 99233 SBSQ HOSP IP/OBS HIGH 50: CPT

## 2023-10-22 RX ORDER — POTASSIUM PHOSPHATE, MONOBASIC POTASSIUM PHOSPHATE, DIBASIC 236; 224 MG/ML; MG/ML
15 INJECTION, SOLUTION INTRAVENOUS ONCE
Refills: 0 | Status: COMPLETED | OUTPATIENT
Start: 2023-10-22 | End: 2023-10-22

## 2023-10-22 RX ORDER — OXYCODONE HYDROCHLORIDE 5 MG/1
10 TABLET ORAL EVERY 6 HOURS
Refills: 0 | Status: DISCONTINUED | OUTPATIENT
Start: 2023-10-22 | End: 2023-10-23

## 2023-10-22 RX ORDER — ACETAMINOPHEN 500 MG
650 TABLET ORAL EVERY 6 HOURS
Refills: 0 | Status: DISCONTINUED | OUTPATIENT
Start: 2023-10-22 | End: 2023-10-23

## 2023-10-22 RX ORDER — CEFTRIAXONE 500 MG/1
1000 INJECTION, POWDER, FOR SOLUTION INTRAMUSCULAR; INTRAVENOUS EVERY 24 HOURS
Refills: 0 | Status: DISCONTINUED | OUTPATIENT
Start: 2023-10-22 | End: 2023-10-23

## 2023-10-22 RX ORDER — MAGNESIUM SULFATE 500 MG/ML
1 VIAL (ML) INJECTION ONCE
Refills: 0 | Status: COMPLETED | OUTPATIENT
Start: 2023-10-22 | End: 2023-10-22

## 2023-10-22 RX ORDER — ACETAMINOPHEN 500 MG
650 TABLET ORAL EVERY 6 HOURS
Refills: 0 | Status: DISCONTINUED | OUTPATIENT
Start: 2023-10-22 | End: 2023-10-22

## 2023-10-22 RX ORDER — METRONIDAZOLE 500 MG
500 TABLET ORAL EVERY 8 HOURS
Refills: 0 | Status: DISCONTINUED | OUTPATIENT
Start: 2023-10-22 | End: 2023-10-23

## 2023-10-22 RX ORDER — HYDROMORPHONE HYDROCHLORIDE 2 MG/ML
0.25 INJECTION INTRAMUSCULAR; INTRAVENOUS; SUBCUTANEOUS EVERY 6 HOURS
Refills: 0 | Status: DISCONTINUED | OUTPATIENT
Start: 2023-10-22 | End: 2023-10-23

## 2023-10-22 RX ORDER — OXYCODONE HYDROCHLORIDE 5 MG/1
5 TABLET ORAL EVERY 6 HOURS
Refills: 0 | Status: DISCONTINUED | OUTPATIENT
Start: 2023-10-22 | End: 2023-10-23

## 2023-10-22 RX ORDER — KETOROLAC TROMETHAMINE 30 MG/ML
30 SYRINGE (ML) INJECTION EVERY 8 HOURS
Refills: 0 | Status: DISCONTINUED | OUTPATIENT
Start: 2023-10-22 | End: 2023-10-23

## 2023-10-22 RX ORDER — METRONIDAZOLE 500 MG
500 TABLET ORAL EVERY 8 HOURS
Refills: 0 | Status: DISCONTINUED | OUTPATIENT
Start: 2023-10-22 | End: 2023-10-22

## 2023-10-22 RX ADMIN — Medication 650 MILLIGRAM(S): at 21:15

## 2023-10-22 RX ADMIN — POTASSIUM PHOSPHATE, MONOBASIC POTASSIUM PHOSPHATE, DIBASIC 62.5 MILLIMOLE(S): 236; 224 INJECTION, SOLUTION INTRAVENOUS at 10:37

## 2023-10-22 RX ADMIN — CEFTRIAXONE 100 MILLIGRAM(S): 500 INJECTION, POWDER, FOR SOLUTION INTRAMUSCULAR; INTRAVENOUS at 10:53

## 2023-10-22 RX ADMIN — Medication 100 GRAM(S): at 10:37

## 2023-10-22 RX ADMIN — Medication 100 MILLIGRAM(S): at 12:24

## 2023-10-22 RX ADMIN — Medication 100 MILLIGRAM(S): at 18:53

## 2023-10-22 RX ADMIN — Medication 0.5 MILLIGRAM(S): at 02:53

## 2023-10-22 RX ADMIN — Medication 650 MILLIGRAM(S): at 09:07

## 2023-10-22 RX ADMIN — QUETIAPINE FUMARATE 100 MILLIGRAM(S): 200 TABLET, FILM COATED ORAL at 21:15

## 2023-10-22 RX ADMIN — OXYCODONE HYDROCHLORIDE 10 MILLIGRAM(S): 5 TABLET ORAL at 13:30

## 2023-10-22 RX ADMIN — Medication 30 MILLIGRAM(S): at 18:49

## 2023-10-22 RX ADMIN — Medication 650 MILLIGRAM(S): at 04:13

## 2023-10-22 RX ADMIN — OXYCODONE HYDROCHLORIDE 10 MILLIGRAM(S): 5 TABLET ORAL at 14:15

## 2023-10-22 RX ADMIN — PANTOPRAZOLE SODIUM 40 MILLIGRAM(S): 20 TABLET, DELAYED RELEASE ORAL at 12:25

## 2023-10-22 RX ADMIN — Medication 1 MILLIGRAM(S): at 12:23

## 2023-10-22 RX ADMIN — Medication 650 MILLIGRAM(S): at 15:15

## 2023-10-22 RX ADMIN — Medication 500 MILLIGRAM(S): at 10:30

## 2023-10-22 RX ADMIN — OXYCODONE HYDROCHLORIDE 10 MILLIGRAM(S): 5 TABLET ORAL at 05:39

## 2023-10-22 RX ADMIN — OXYCODONE HYDROCHLORIDE 10 MILLIGRAM(S): 5 TABLET ORAL at 04:13

## 2023-10-22 RX ADMIN — Medication 650 MILLIGRAM(S): at 02:52

## 2023-10-22 NOTE — PROGRESS NOTE ADULT - ASSESSMENT
47 yo F with PMHx of ADHD, anxiety and PSHx of remote appendectomy who presented with acute onset abdominal pain, admitted for severe sepsis (tachycadria, bandemia to 19%, respiratory rate in the 30's, elevated lactate, source/suspected enteritis admitted for SICU for HD instability now stepped down to surgical service for further management       SBO   hyponatremia, hypokalemia , hypophosphatemia   na improved to 138-- k 4.2, phosp 2.5  resolved after replacement    CT repeat on 10/21 : Persistent findings suggesting a partial or resolving small bowel   obstruction. Persistent ileal thickening suggesting ileitis, differential including infectious versus inflammatory etiology. Abdominal pelvic ascites with early organization of the lower abdominal and pelvic ascites.  recommend GI fu   rest of the care per primary team     severe sepsis -likely source abdomen -enteritis ( diarrhea )   bandemia  initial CT abd /pelvis cw enteritis -- repeat 10/19 CT w SBO --  stool sample O/P neg, ruled out c diff (neg) on admission   was on BS antibiotics with Zosyn- dc'd - now developed diarrhea and elevated wbc + worsening abdominal pain ---- recommend to repeat C diff pcr   NEGATIVE send GI PCR,    NGTD--fu blood cx    WBC 17 --15k-11k  --- 15k --14K  lactic acidosis resolved after fluid resuscitation 7--> 1.6 ( 2/2 bowel hypoperfusion 2/2 hypotension)   though hypotension responsive to IVF , she remains tachycardic     new gonorrhea positive   s/p rocephin IM 1 dose 10/18      anxiety   on Seroquel, saphris at home   oral meds can be resumed when tolerating po meds/diet   would get psych to weight in for Saprhis       ADHD  Patient reports 60mg vyvance daily at home. on hold     substance use disorder  pt uses [facility]C-   utox positive for thc, amphetamine( takes Vyvanse), opioids  received opioids in ED      dvt ppx lovenox qd

## 2023-10-22 NOTE — PROGRESS NOTE ADULT - SUBJECTIVE AND OBJECTIVE BOX
GI Consult Progress Note:     OVERNIGHT EVENTS: EWA.    SUBJECTIVE / INTERVAL HPI:   Patient seen and examined at bedside. Having bowel movements and still complains of significant bloating. Overall abdominal pain about the same. GI PCR and c diff negative.         VITAL SIGNS:  Vital Signs Last 24 Hrs  T(C): 36.7 (22 Oct 2023 18:17), Max: 37.3 (21 Oct 2023 20:00)  T(F): 98.1 (22 Oct 2023 18:17), Max: 99.2 (21 Oct 2023 20:00)  HR: 104 (22 Oct 2023 18:17) (95 - 107)  BP: 153/96 (22 Oct 2023 18:17) (105/64 - 153/96)  BP(mean): --  RR: 18 (22 Oct 2023 14:24) (16 - 18)  SpO2: 98% (22 Oct 2023 14:24) (95% - 98%)    Parameters below as of 22 Oct 2023 14:24  Patient On (Oxygen Delivery Method): room air        10-21-23 @ 07:01  -  10-22-23 @ 07:00  --------------------------------------------------------  IN: 100 mL / OUT: 800 mL / NET: -700 mL    10-22-23 @ 07:01  -  10-22-23 @ 18:45  --------------------------------------------------------  IN: 400 mL / OUT: 0 mL / NET: 400 mL        PHYSICAL EXAM:  General: No acute distress, affect improved from day prior   Lungs: Normal respiratory effort and no intercostal retractions  Cardiovascular: RRR  Abdomen: Soft, non-tender, distended   Neurological: Alert and oriented x3  Skin: Warm and dry. No obvious rash      MEDICATIONS:  MEDICATIONS  (STANDING):  acetaminophen     Tablet .. 650 milliGRAM(s) Oral every 6 hours  cefTRIAXone   IVPB 1000 milliGRAM(s) IV Intermittent every 24 hours  enoxaparin Injectable 40 milliGRAM(s) SubCutaneous every 24 hours  folic acid Injectable 1 milliGRAM(s) IV Push daily  influenza   Vaccine 0.5 milliLiter(s) IntraMuscular once  ketorolac   Injectable 30 milliGRAM(s) IV Push every 8 hours  metroNIDAZOLE  IVPB 500 milliGRAM(s) IV Intermittent every 8 hours  pantoprazole  Injectable 40 milliGRAM(s) IV Push daily  QUEtiapine 100 milliGRAM(s) Oral daily  thiamine Injectable 100 milliGRAM(s) IV Push daily    MEDICATIONS  (PRN):  benzocaine/menthol Lozenge 1 Lozenge Oral four times a day PRN Sore Throat  HYDROmorphone  Injectable 0.25 milliGRAM(s) IV Push every 6 hours PRN Break through pain  LORazepam   Injectable 0.5 milliGRAM(s) IV Push every 4 hours PRN Anxiety  oxyCODONE    IR 5 milliGRAM(s) Oral every 6 hours PRN Moderate Pain (4 - 6)  oxyCODONE    IR 10 milliGRAM(s) Oral every 6 hours PRN Severe Pain (7 - 10)      ALLERGIES:  Allergies    No Known Allergies    Intolerances        LABS:                        10.9   14.62 )-----------( 295      ( 22 Oct 2023 05:30 )             35.6     10-22    138  |  107  |  5<L>  ----------------------------<  135<H>  4.2   |  18<L>  |  0.53    Ca    8.7      22 Oct 2023 05:30  Phos  2.5     10-22  Mg     1.9     10-22        Urinalysis Basic - ( 22 Oct 2023 05:30 )    Color: x / Appearance: x / SG: x / pH: x  Gluc: 135 mg/dL / Ketone: x  / Bili: x / Urobili: x   Blood: x / Protein: x / Nitrite: x   Leuk Esterase: x / RBC: x / WBC x   Sq Epi: x / Non Sq Epi: x / Bacteria: x      CAPILLARY BLOOD GLUCOSE            RADIOLOGY & ADDITIONAL TESTS: Reviewed.

## 2023-10-22 NOTE — PROGRESS NOTE ADULT - ASSESSMENT
48F with PMHx of HTN, HLD and anxiety and PSHx of remote appendectomy who presents to [facility] for acute-onset abdominal pain this AM which woke her up. Pt initially admitted to the SICU for monitoring and elevated lactate; pt stabilized w/ IVF and subsequently stepped down to telemetry (10/18).     CLD  CTX/Flagyl  pain/nausea PRN  Ativan 0.5mg PRN  SCD/Lovenox qd  CIWA

## 2023-10-22 NOTE — PROGRESS NOTE ADULT - SUBJECTIVE AND OBJECTIVE BOX
Patient is a 48y old  Female who presents with a chief complaint of Abdominal pain with sepsis (22 Oct 2023 08:29)      INTERVAL HPI/OVERNIGHT EVENTS: abd pain    MEDICATIONS  (STANDING):  acetaminophen     Tablet .. 650 milliGRAM(s) Oral every 6 hours  cefTRIAXone   IVPB 1000 milliGRAM(s) IV Intermittent every 24 hours  enoxaparin Injectable 40 milliGRAM(s) SubCutaneous every 24 hours  folic acid Injectable 1 milliGRAM(s) IV Push daily  influenza   Vaccine 0.5 milliLiter(s) IntraMuscular once  metroNIDAZOLE  IVPB 500 milliGRAM(s) IV Intermittent every 8 hours  pantoprazole  Injectable 40 milliGRAM(s) IV Push daily  QUEtiapine 100 milliGRAM(s) Oral daily  thiamine Injectable 100 milliGRAM(s) IV Push daily    MEDICATIONS  (PRN):  benzocaine/menthol Lozenge 1 Lozenge Oral four times a day PRN Sore Throat  HYDROmorphone  Injectable 0.25 milliGRAM(s) IV Push every 6 hours PRN Break through pain  LORazepam   Injectable 0.5 milliGRAM(s) IV Push every 4 hours PRN Anxiety  oxyCODONE    IR 5 milliGRAM(s) Oral every 6 hours PRN Moderate Pain (4 - 6)  oxyCODONE    IR 10 milliGRAM(s) Oral every 6 hours PRN Severe Pain (7 - 10)      __________________________________________________  REVIEW OF SYSTEMS:    CONSTITUTIONAL: No fever,   EYES: no acute visual disturbances  NECK: No pain or stiffness  RESPIRATORY: No cough; No shortness of breath  CARDIOVASCULAR: No chest pain, no palpitations  GASTROINTESTINAL: +pain. No nausea or vomiting; +diarrhea   NEUROLOGICAL: No headache or numbness, no tremors  MUSCULOSKELETAL: No joint pain, no muscle pain  GENITOURINARY: no dysuria, no frequency, no hesitancy  PSYCHIATRY: no depression , no anxiety  ALL OTHER  ROS negative        Vital Signs Last 24 Hrs  T(C): 36.8 (22 Oct 2023 03:53), Max: 37.3 (21 Oct 2023 20:00)  T(F): 98.2 (22 Oct 2023 03:53), Max: 99.2 (21 Oct 2023 20:00)  HR: 107 (22 Oct 2023 03:53) (102 - 107)  BP: 122/82 (22 Oct 2023 03:53) (105/64 - 123/87)  BP(mean): --  RR: 16 (22 Oct 2023 03:53) (16 - 17)  SpO2: 95% (22 Oct 2023 03:53) (95% - 97%)    Parameters below as of 22 Oct 2023 03:53  Patient On (Oxygen Delivery Method): room air        ________________________________________________  PHYSICAL EXAM:  GENERAL: NAD  HEENT: Normocephalic;  conjunctivae and sclerae clear;     NECK : supple  CHEST/LUNG: Clear to auscultation bilaterally with good air entry   HEART: S1 S2  regular; no murmurs, gallops or rubs  ABDOMEN: Soft, TTP, +distended;   EXTREMITIES: no cyanosis; no edema; no calf tenderness  SKIN: warm and dry; no rash  NERVOUS SYSTEM:  Awake and alert; Oriented  to place, person and time ; no new deficits    _________________________________________________  LABS:                        10.9   14.62 )-----------( 295      ( 22 Oct 2023 05:30 )             35.6     10-22    138  |  107  |  5<L>  ----------------------------<  135<H>  4.2   |  18<L>  |  0.53    Ca    8.7      22 Oct 2023 05:30  Phos  2.5     10-22  Mg     1.9     10-22        Urinalysis Basic - ( 22 Oct 2023 05:30 )    Color: x / Appearance: x / SG: x / pH: x  Gluc: 135 mg/dL / Ketone: x  / Bili: x / Urobili: x   Blood: x / Protein: x / Nitrite: x   Leuk Esterase: x / RBC: x / WBC x   Sq Epi: x / Non Sq Epi: x / Bacteria: x      CAPILLARY BLOOD GLUCOSE            RADIOLOGY & ADDITIONAL TESTS:      Plan of care was discussed with patient and /or primary care giver; all questions and concerns were addressed and care was aligned with patient's wishes.

## 2023-10-22 NOTE — PROGRESS NOTE ADULT - SUBJECTIVE AND OBJECTIVE BOX
SUBJECTIVE: Patient seen and examined bedside. Seen laying in bed this morning, denies any n/v, +f/ bms x 3. Had pain ON. states that the b/l abdominal pain is about the same as yesterday.     enoxaparin Injectable 40 milliGRAM(s) SubCutaneous every 24 hours      Vital Signs Last 24 Hrs  T(C): 36.8 (22 Oct 2023 03:53), Max: 37.5 (21 Oct 2023 08:32)  T(F): 98.2 (22 Oct 2023 03:53), Max: 99.5 (21 Oct 2023 08:32)  HR: 107 (22 Oct 2023 03:53) (86 - 110)  BP: 122/82 (22 Oct 2023 03:53) (105/64 - 132/80)  BP(mean): --  RR: 16 (22 Oct 2023 03:53) (16 - 18)  SpO2: 95% (22 Oct 2023 03:53) (95% - 98%)    Parameters below as of 22 Oct 2023 03:53  Patient On (Oxygen Delivery Method): room air      I&O's Detail    21 Oct 2023 07:01  -  22 Oct 2023 07:00  --------------------------------------------------------  IN:    IV PiggyBack: 100 mL  Total IN: 100 mL    OUT:    Voided (mL): 800 mL  Total OUT: 800 mL    Total NET: -700 mL          General: NAD, resting comfortably in bed  C/V: NSR  Pulm: Nonlabored breathing, no respiratory distress  Abd: soft, NT/ND.  Extrem: WWP, no edema, SCDs in place        LABS:                        10.9   14.62 )-----------( 295      ( 22 Oct 2023 05:30 )             35.6     10-22    138  |  107  |  5<L>  ----------------------------<  135<H>  x    |  18<L>  |  0.53    Ca    8.7      22 Oct 2023 05:30  Phos  2.5     10-22  Mg     1.9     10-22        Urinalysis Basic - ( 22 Oct 2023 05:30 )    Color: x / Appearance: x / SG: x / pH: x  Gluc: 135 mg/dL / Ketone: x  / Bili: x / Urobili: x   Blood: x / Protein: x / Nitrite: x   Leuk Esterase: x / RBC: x / WBC x   Sq Epi: x / Non Sq Epi: x / Bacteria: x        RADIOLOGY & ADDITIONAL STUDIES:   SUBJECTIVE: Patient seen and examined bedside. Seen laying in bed this morning, states pain is improving after being given standing Tylenol. Denies any n/v, +f/ bms x 3.         enoxaparin Injectable 40 milliGRAM(s) SubCutaneous every 24 hours      Vital Signs Last 24 Hrs  T(C): 36.8 (22 Oct 2023 03:53), Max: 37.5 (21 Oct 2023 08:32)  T(F): 98.2 (22 Oct 2023 03:53), Max: 99.5 (21 Oct 2023 08:32)  HR: 107 (22 Oct 2023 03:53) (86 - 110)  BP: 122/82 (22 Oct 2023 03:53) (105/64 - 132/80)  BP(mean): --  RR: 16 (22 Oct 2023 03:53) (16 - 18)  SpO2: 95% (22 Oct 2023 03:53) (95% - 98%)    Parameters below as of 22 Oct 2023 03:53  Patient On (Oxygen Delivery Method): room air      I&O's Detail    21 Oct 2023 07:01  -  22 Oct 2023 07:00  --------------------------------------------------------  IN:    IV PiggyBack: 100 mL  Total IN: 100 mL    OUT:    Voided (mL): 800 mL  Total OUT: 800 mL    Total NET: -700 mL          General: NAD, resting comfortably in bed  C/V: NSR  Pulm: Nonlabored breathing, no respiratory distress  Abd: mTTP in b/l lower quadrants, moderate distension.   Extrem: WWP, no edema, SCDs in place        LABS:                        10.9   14.62 )-----------( 295      ( 22 Oct 2023 05:30 )             35.6     10-22    138  |  107  |  5<L>  ----------------------------<  135<H>  x    |  18<L>  |  0.53    Ca    8.7      22 Oct 2023 05:30  Phos  2.5     10-22  Mg     1.9     10-22        Urinalysis Basic - ( 22 Oct 2023 05:30 )    Color: x / Appearance: x / SG: x / pH: x  Gluc: 135 mg/dL / Ketone: x  / Bili: x / Urobili: x   Blood: x / Protein: x / Nitrite: x   Leuk Esterase: x / RBC: x / WBC x   Sq Epi: x / Non Sq Epi: x / Bacteria: x        RADIOLOGY & ADDITIONAL STUDIES:

## 2023-10-22 NOTE — PROGRESS NOTE ADULT - ASSESSMENT
48F with PMH of HTN, HLD and anxiety and PSHx of remote appendectomy, who first presented for abdominal pain after sexual intercourse. Admitted to surgery for treatment of SBO, which has since resolved. Continues to have abdominal pain and diarrhea, so GI consulted due to possible concern for IBD.     Given recent colonoscopy (August 2023) which was largely normal and no prior history of GI related issues, do not suspect IBD at this time. Suspect symptoms most likely 2/2 to infectious process, like gastroenteritis given patient came in with tachycardia, fever, and bandemia.      CT abdomen/pelvis: Persistent findings suggesting a partial or resolving small bowel obstruction. Persistent ileal thickening suggesting ileitis, differential including infectious versus inflammatory etiology. Abdominal pelvic ascites with early organization of the lower abdominal and pelvic ascites.    Recommendations:   - c diff, stool culture, and GI PCR negative (but GI PCR sent after patient received a course of abx)   - repeat CT abdomen/pelvis showing partial or resolving small bowel obstruction and ileitis   - currently on ceftriaxone/flagyl; abx as per primary team     Case discussed with Dr. Rincon. GI Team will continue to follow.     Crystal Mak D.O.   Gastroenterology Fellow  Weekday 7am-5pm Pager: [phone]  Weeknights/Weekend/Holiday Coverage: Please call the  for contact info

## 2023-10-23 VITALS
OXYGEN SATURATION: 97 % | SYSTOLIC BLOOD PRESSURE: 133 MMHG | TEMPERATURE: 98 F | RESPIRATION RATE: 18 BRPM | DIASTOLIC BLOOD PRESSURE: 78 MMHG | HEART RATE: 98 BPM

## 2023-10-23 LAB
ANION GAP SERPL CALC-SCNC: 8 MMOL/L — SIGNIFICANT CHANGE UP (ref 5–17)
ANION GAP SERPL CALC-SCNC: 8 MMOL/L — SIGNIFICANT CHANGE UP (ref 5–17)
BUN SERPL-MCNC: 8 MG/DL — SIGNIFICANT CHANGE UP (ref 7–23)
BUN SERPL-MCNC: 8 MG/DL — SIGNIFICANT CHANGE UP (ref 7–23)
CALCIUM SERPL-MCNC: 8.3 MG/DL — LOW (ref 8.4–10.5)
CALCIUM SERPL-MCNC: 8.3 MG/DL — LOW (ref 8.4–10.5)
CHLORIDE SERPL-SCNC: 104 MMOL/L — SIGNIFICANT CHANGE UP (ref 96–108)
CHLORIDE SERPL-SCNC: 104 MMOL/L — SIGNIFICANT CHANGE UP (ref 96–108)
CO2 SERPL-SCNC: 25 MMOL/L — SIGNIFICANT CHANGE UP (ref 22–31)
CO2 SERPL-SCNC: 25 MMOL/L — SIGNIFICANT CHANGE UP (ref 22–31)
CREAT SERPL-MCNC: 0.62 MG/DL — SIGNIFICANT CHANGE UP (ref 0.5–1.3)
CREAT SERPL-MCNC: 0.62 MG/DL — SIGNIFICANT CHANGE UP (ref 0.5–1.3)
EGFR: 108 ML/MIN/1.73M2 — SIGNIFICANT CHANGE UP
EGFR: 108 ML/MIN/1.73M2 — SIGNIFICANT CHANGE UP
GLUCOSE SERPL-MCNC: 107 MG/DL — HIGH (ref 70–99)
GLUCOSE SERPL-MCNC: 107 MG/DL — HIGH (ref 70–99)
HCT VFR BLD CALC: 29.9 % — LOW (ref 34.5–45)
HCT VFR BLD CALC: 29.9 % — LOW (ref 34.5–45)
HGB BLD-MCNC: 9.8 G/DL — LOW (ref 11.5–15.5)
HGB BLD-MCNC: 9.8 G/DL — LOW (ref 11.5–15.5)
MAGNESIUM SERPL-MCNC: 1.8 MG/DL — SIGNIFICANT CHANGE UP (ref 1.6–2.6)
MAGNESIUM SERPL-MCNC: 1.8 MG/DL — SIGNIFICANT CHANGE UP (ref 1.6–2.6)
MCHC RBC-ENTMCNC: 31.9 PG — SIGNIFICANT CHANGE UP (ref 27–34)
MCHC RBC-ENTMCNC: 31.9 PG — SIGNIFICANT CHANGE UP (ref 27–34)
MCHC RBC-ENTMCNC: 32.8 GM/DL — SIGNIFICANT CHANGE UP (ref 32–36)
MCHC RBC-ENTMCNC: 32.8 GM/DL — SIGNIFICANT CHANGE UP (ref 32–36)
MCV RBC AUTO: 97.4 FL — SIGNIFICANT CHANGE UP (ref 80–100)
MCV RBC AUTO: 97.4 FL — SIGNIFICANT CHANGE UP (ref 80–100)
NRBC # BLD: 0 /100 WBCS — SIGNIFICANT CHANGE UP (ref 0–0)
NRBC # BLD: 0 /100 WBCS — SIGNIFICANT CHANGE UP (ref 0–0)
PHOSPHATE SERPL-MCNC: 3.7 MG/DL — SIGNIFICANT CHANGE UP (ref 2.5–4.5)
PHOSPHATE SERPL-MCNC: 3.7 MG/DL — SIGNIFICANT CHANGE UP (ref 2.5–4.5)
PLATELET # BLD AUTO: 493 K/UL — HIGH (ref 150–400)
PLATELET # BLD AUTO: 493 K/UL — HIGH (ref 150–400)
POTASSIUM SERPL-MCNC: 3.6 MMOL/L — SIGNIFICANT CHANGE UP (ref 3.5–5.3)
POTASSIUM SERPL-MCNC: 3.6 MMOL/L — SIGNIFICANT CHANGE UP (ref 3.5–5.3)
POTASSIUM SERPL-SCNC: 3.6 MMOL/L — SIGNIFICANT CHANGE UP (ref 3.5–5.3)
POTASSIUM SERPL-SCNC: 3.6 MMOL/L — SIGNIFICANT CHANGE UP (ref 3.5–5.3)
RBC # BLD: 3.07 M/UL — LOW (ref 3.8–5.2)
RBC # BLD: 3.07 M/UL — LOW (ref 3.8–5.2)
RBC # FLD: 14.4 % — SIGNIFICANT CHANGE UP (ref 10.3–14.5)
RBC # FLD: 14.4 % — SIGNIFICANT CHANGE UP (ref 10.3–14.5)
SODIUM SERPL-SCNC: 137 MMOL/L — SIGNIFICANT CHANGE UP (ref 135–145)
SODIUM SERPL-SCNC: 137 MMOL/L — SIGNIFICANT CHANGE UP (ref 135–145)
WBC # BLD: 15.61 K/UL — HIGH (ref 3.8–10.5)
WBC # BLD: 15.61 K/UL — HIGH (ref 3.8–10.5)
WBC # FLD AUTO: 15.61 K/UL — HIGH (ref 3.8–10.5)
WBC # FLD AUTO: 15.61 K/UL — HIGH (ref 3.8–10.5)

## 2023-10-23 PROCEDURE — 74019 RADEX ABDOMEN 2 VIEWS: CPT

## 2023-10-23 PROCEDURE — 83605 ASSAY OF LACTIC ACID: CPT

## 2023-10-23 PROCEDURE — 82962 GLUCOSE BLOOD TEST: CPT

## 2023-10-23 PROCEDURE — 80048 BASIC METABOLIC PNL TOTAL CA: CPT

## 2023-10-23 PROCEDURE — 84702 CHORIONIC GONADOTROPIN TEST: CPT

## 2023-10-23 PROCEDURE — 71045 X-RAY EXAM CHEST 1 VIEW: CPT

## 2023-10-23 PROCEDURE — 99233 SBSQ HOSP IP/OBS HIGH 50: CPT

## 2023-10-23 PROCEDURE — 86901 BLOOD TYPING SEROLOGIC RH(D): CPT

## 2023-10-23 PROCEDURE — 85610 PROTHROMBIN TIME: CPT

## 2023-10-23 PROCEDURE — 82550 ASSAY OF CK (CPK): CPT

## 2023-10-23 PROCEDURE — 84100 ASSAY OF PHOSPHORUS: CPT

## 2023-10-23 PROCEDURE — 87507 IADNA-DNA/RNA PROBE TQ 12-25: CPT

## 2023-10-23 PROCEDURE — 82803 BLOOD GASES ANY COMBINATION: CPT

## 2023-10-23 PROCEDURE — 84484 ASSAY OF TROPONIN QUANT: CPT

## 2023-10-23 PROCEDURE — 84295 ASSAY OF SERUM SODIUM: CPT

## 2023-10-23 PROCEDURE — 82248 BILIRUBIN DIRECT: CPT

## 2023-10-23 PROCEDURE — 36415 COLL VENOUS BLD VENIPUNCTURE: CPT

## 2023-10-23 PROCEDURE — 93005 ELECTROCARDIOGRAM TRACING: CPT

## 2023-10-23 PROCEDURE — 80053 COMPREHEN METABOLIC PANEL: CPT

## 2023-10-23 PROCEDURE — 83690 ASSAY OF LIPASE: CPT

## 2023-10-23 PROCEDURE — 87324 CLOSTRIDIUM AG IA: CPT

## 2023-10-23 PROCEDURE — 85027 COMPLETE CBC AUTOMATED: CPT

## 2023-10-23 PROCEDURE — 85025 COMPLETE CBC W/AUTO DIFF WBC: CPT

## 2023-10-23 PROCEDURE — 86900 BLOOD TYPING SEROLOGIC ABO: CPT

## 2023-10-23 PROCEDURE — 96375 TX/PRO/DX INJ NEW DRUG ADDON: CPT

## 2023-10-23 PROCEDURE — 99291 CRITICAL CARE FIRST HOUR: CPT

## 2023-10-23 PROCEDURE — 76705 ECHO EXAM OF ABDOMEN: CPT

## 2023-10-23 PROCEDURE — 74177 CT ABD & PELVIS W/CONTRAST: CPT

## 2023-10-23 PROCEDURE — 96374 THER/PROPH/DIAG INJ IV PUSH: CPT

## 2023-10-23 PROCEDURE — 87086 URINE CULTURE/COLONY COUNT: CPT

## 2023-10-23 PROCEDURE — 87591 N.GONORRHOEAE DNA AMP PROB: CPT

## 2023-10-23 PROCEDURE — 84443 ASSAY THYROID STIM HORMONE: CPT

## 2023-10-23 PROCEDURE — 86850 RBC ANTIBODY SCREEN: CPT

## 2023-10-23 PROCEDURE — 87491 CHLMYD TRACH DNA AMP PROBE: CPT

## 2023-10-23 PROCEDURE — 83735 ASSAY OF MAGNESIUM: CPT

## 2023-10-23 PROCEDURE — 82330 ASSAY OF CALCIUM: CPT

## 2023-10-23 PROCEDURE — 82247 BILIRUBIN TOTAL: CPT

## 2023-10-23 PROCEDURE — 87449 NOS EACH ORGANISM AG IA: CPT

## 2023-10-23 PROCEDURE — 81001 URINALYSIS AUTO W/SCOPE: CPT

## 2023-10-23 PROCEDURE — 87040 BLOOD CULTURE FOR BACTERIA: CPT

## 2023-10-23 PROCEDURE — 83036 HEMOGLOBIN GLYCOSYLATED A1C: CPT

## 2023-10-23 PROCEDURE — 84132 ASSAY OF SERUM POTASSIUM: CPT

## 2023-10-23 PROCEDURE — 82533 TOTAL CORTISOL: CPT

## 2023-10-23 PROCEDURE — 99232 SBSQ HOSP IP/OBS MODERATE 35: CPT

## 2023-10-23 PROCEDURE — 80074 ACUTE HEPATITIS PANEL: CPT

## 2023-10-23 PROCEDURE — 85730 THROMBOPLASTIN TIME PARTIAL: CPT

## 2023-10-23 PROCEDURE — 80307 DRUG TEST PRSMV CHEM ANLYZR: CPT

## 2023-10-23 PROCEDURE — 87177 OVA AND PARASITES SMEARS: CPT

## 2023-10-23 RX ORDER — POTASSIUM CHLORIDE 20 MEQ
10 PACKET (EA) ORAL
Refills: 0 | Status: DISCONTINUED | OUTPATIENT
Start: 2023-10-23 | End: 2023-10-23

## 2023-10-23 RX ORDER — POTASSIUM CHLORIDE 20 MEQ
20 PACKET (EA) ORAL ONCE
Refills: 0 | Status: COMPLETED | OUTPATIENT
Start: 2023-10-23 | End: 2023-10-23

## 2023-10-23 RX ORDER — SODIUM CHLORIDE 9 MG/ML
500 INJECTION, SOLUTION INTRAVENOUS ONCE
Refills: 0 | Status: COMPLETED | OUTPATIENT
Start: 2023-10-23 | End: 2023-10-23

## 2023-10-23 RX ADMIN — Medication 100 MILLIGRAM(S): at 03:26

## 2023-10-23 RX ADMIN — Medication 650 MILLIGRAM(S): at 09:44

## 2023-10-23 RX ADMIN — Medication 650 MILLIGRAM(S): at 03:28

## 2023-10-23 RX ADMIN — Medication 1 MILLIGRAM(S): at 11:23

## 2023-10-23 RX ADMIN — Medication 30 MILLIGRAM(S): at 02:20

## 2023-10-23 RX ADMIN — Medication 100 MILLIGRAM(S): at 11:22

## 2023-10-23 RX ADMIN — ENOXAPARIN SODIUM 40 MILLIGRAM(S): 100 INJECTION SUBCUTANEOUS at 06:49

## 2023-10-23 RX ADMIN — CEFTRIAXONE 100 MILLIGRAM(S): 500 INJECTION, POWDER, FOR SOLUTION INTRAMUSCULAR; INTRAVENOUS at 09:44

## 2023-10-23 RX ADMIN — Medication 20 MILLIEQUIVALENT(S): at 15:47

## 2023-10-23 RX ADMIN — PANTOPRAZOLE SODIUM 40 MILLIGRAM(S): 20 TABLET, DELAYED RELEASE ORAL at 11:24

## 2023-10-23 RX ADMIN — Medication 650 MILLIGRAM(S): at 04:26

## 2023-10-23 RX ADMIN — SODIUM CHLORIDE 500 MILLILITER(S): 9 INJECTION, SOLUTION INTRAVENOUS at 02:20

## 2023-10-23 RX ADMIN — Medication 650 MILLIGRAM(S): at 15:47

## 2023-10-23 RX ADMIN — Medication 30 MILLIGRAM(S): at 09:44

## 2023-10-23 NOTE — PROGRESS NOTE ADULT - SUBJECTIVE AND OBJECTIVE BOX
GI Consult Progress Note:     OVERNIGHT EVENTS:    SUBJECTIVE / INTERVAL HPI: Patient seen and examined at bedside.     VITAL SIGNS:  Vital Signs Last 24 Hrs  T(C): 36.5 (23 Oct 2023 16:30), Max: 37.1 (22 Oct 2023 21:10)  T(F): 97.7 (23 Oct 2023 16:30), Max: 98.7 (22 Oct 2023 21:10)  HR: 98 (23 Oct 2023 16:30) (86 - 98)  BP: 133/78 (23 Oct 2023 16:30) (93/54 - 133/78)  BP(mean): --  RR: 18 (23 Oct 2023 16:30) (12 - 20)  SpO2: 97% (23 Oct 2023 16:30) (92% - 99%)    Parameters below as of 23 Oct 2023 16:30  Patient On (Oxygen Delivery Method): room air        10-22-23 @ 07:01  -  10-23-23 @ 07:00  --------------------------------------------------------  IN: 1150 mL / OUT: 200 mL / NET: 950 mL    10-23-23 @ 07:01  -  10-23-23 @ 18:45  --------------------------------------------------------  IN: 720 mL / OUT: 0 mL / NET: 720 mL        PHYSICAL EXAM:    General: WDWN  HEENT: NC/AT; PERRL, anicteric sclera; MMM  Neck: supple  Cardiovascular: +S1/S2; RRR  Respiratory: CTA B/L; no W/R/R  Gastrointestinal: soft, NT/ND; +BSx4  Extremities: WWP; no edema, clubbing or cyanosis  Vascular: 2+ radial, DP/PT pulses B/L  Neurological: AAOx3; no focal deficits    MEDICATIONS:  MEDICATIONS  (STANDING):  acetaminophen     Tablet .. 650 milliGRAM(s) Oral every 6 hours  enoxaparin Injectable 40 milliGRAM(s) SubCutaneous every 24 hours  folic acid Injectable 1 milliGRAM(s) IV Push daily  influenza   Vaccine 0.5 milliLiter(s) IntraMuscular once  ketorolac   Injectable 30 milliGRAM(s) IV Push every 8 hours  pantoprazole  Injectable 40 milliGRAM(s) IV Push daily  QUEtiapine 100 milliGRAM(s) Oral daily  thiamine Injectable 100 milliGRAM(s) IV Push daily    MEDICATIONS  (PRN):  benzocaine/menthol Lozenge 1 Lozenge Oral four times a day PRN Sore Throat  HYDROmorphone  Injectable 0.25 milliGRAM(s) IV Push every 6 hours PRN Break through pain  LORazepam   Injectable 0.5 milliGRAM(s) IV Push every 4 hours PRN Anxiety  oxyCODONE    IR 10 milliGRAM(s) Oral every 6 hours PRN Severe Pain (7 - 10)  oxyCODONE    IR 5 milliGRAM(s) Oral every 6 hours PRN Moderate Pain (4 - 6)      ALLERGIES:  Allergies    No Known Allergies    Intolerances        LABS:                        9.8    15.61 )-----------( 493      ( 23 Oct 2023 05:30 )             29.9     10-23    137  |  104  |  8   ----------------------------<  107<H>  3.6   |  25  |  0.62    Ca    8.3<L>      23 Oct 2023 05:30  Phos  3.7     10-23  Mg     1.8     10-23        Urinalysis Basic - ( 23 Oct 2023 05:30 )    Color: x / Appearance: x / SG: x / pH: x  Gluc: 107 mg/dL / Ketone: x  / Bili: x / Urobili: x   Blood: x / Protein: x / Nitrite: x   Leuk Esterase: x / RBC: x / WBC x   Sq Epi: x / Non Sq Epi: x / Bacteria: x      CAPILLARY BLOOD GLUCOSE            RADIOLOGY & ADDITIONAL TESTS: Reviewed.    ASSESSMENT:    PLAN:    GI Consult Progress Note:     OVERNIGHT EVENTS: EWA.    SUBJECTIVE / INTERVAL HPI:   Patient seen and examined at bedside. Reports that she feels a little better. Was able to eat some food later today. Still with some abdominal bloating.     VITAL SIGNS:  Vital Signs Last 24 Hrs  T(C): 36.5 (23 Oct 2023 16:30), Max: 37.1 (22 Oct 2023 21:10)  T(F): 97.7 (23 Oct 2023 16:30), Max: 98.7 (22 Oct 2023 21:10)  HR: 98 (23 Oct 2023 16:30) (86 - 98)  BP: 133/78 (23 Oct 2023 16:30) (93/54 - 133/78)  BP(mean): --  RR: 18 (23 Oct 2023 16:30) (12 - 20)  SpO2: 97% (23 Oct 2023 16:30) (92% - 99%)    Parameters below as of 23 Oct 2023 16:30  Patient On (Oxygen Delivery Method): room air        10-22-23 @ 07:01  -  10-23-23 @ 07:00  --------------------------------------------------------  IN: 1150 mL / OUT: 200 mL / NET: 950 mL    10-23-23 @ 07:01  -  10-23-23 @ 18:45  --------------------------------------------------------  IN: 720 mL / OUT: 0 mL / NET: 720 mL        PHYSICAL EXAM:  General: No acute distress  Lungs: Normal respiratory effort and no intercostal retractions  Cardiovascular: RRR  Abdomen: Soft, non-tender, slightly distended   Neurological: Alert and oriented x3  Skin: Warm and dry. No obvious rash      MEDICATIONS:  MEDICATIONS  (STANDING):  acetaminophen     Tablet .. 650 milliGRAM(s) Oral every 6 hours  enoxaparin Injectable 40 milliGRAM(s) SubCutaneous every 24 hours  folic acid Injectable 1 milliGRAM(s) IV Push daily  influenza   Vaccine 0.5 milliLiter(s) IntraMuscular once  ketorolac   Injectable 30 milliGRAM(s) IV Push every 8 hours  pantoprazole  Injectable 40 milliGRAM(s) IV Push daily  QUEtiapine 100 milliGRAM(s) Oral daily  thiamine Injectable 100 milliGRAM(s) IV Push daily    MEDICATIONS  (PRN):  benzocaine/menthol Lozenge 1 Lozenge Oral four times a day PRN Sore Throat  HYDROmorphone  Injectable 0.25 milliGRAM(s) IV Push every 6 hours PRN Break through pain  LORazepam   Injectable 0.5 milliGRAM(s) IV Push every 4 hours PRN Anxiety  oxyCODONE    IR 10 milliGRAM(s) Oral every 6 hours PRN Severe Pain (7 - 10)  oxyCODONE    IR 5 milliGRAM(s) Oral every 6 hours PRN Moderate Pain (4 - 6)      ALLERGIES:  Allergies    No Known Allergies    Intolerances        LABS:                        9.8    15.61 )-----------( 493      ( 23 Oct 2023 05:30 )             29.9     10-23    137  |  104  |  8   ----------------------------<  107<H>  3.6   |  25  |  0.62    Ca    8.3<L>      23 Oct 2023 05:30  Phos  3.7     10-23  Mg     1.8     10-23        Urinalysis Basic - ( 23 Oct 2023 05:30 )    Color: x / Appearance: x / SG: x / pH: x  Gluc: 107 mg/dL / Ketone: x  / Bili: x / Urobili: x   Blood: x / Protein: x / Nitrite: x   Leuk Esterase: x / RBC: x / WBC x   Sq Epi: x / Non Sq Epi: x / Bacteria: x      CAPILLARY BLOOD GLUCOSE  RADIOLOGY & ADDITIONAL TESTS: Reviewed.

## 2023-10-23 NOTE — DISCHARGE NOTE NURSING/CASE MANAGEMENT/SOCIAL WORK - PATIENT PORTAL LINK FT
You can access the FollowMyHealth Patient Portal offered by [facility] by registering at the following website: [email or web address]. By joining [employer] [facility]’s FollowMyHealth portal, you will also be able to view your health information using other applications (apps) compatible with our system.

## 2023-10-23 NOTE — PROGRESS NOTE ADULT - ASSESSMENT
48F with PMHx of HTN, HLD and anxiety and PSHx of remote appendectomy who presents to [facility] for acute-onset abdominal pain this AM which woke her up. Pt initially admitted to the SICU for monitoring and elevated lactate; pt stabilized w/ IVF and subsequently stepped down to telemetry (10/18).     Full liquid   CTX/Flagyl  pain/nausea PRN  Ativan 0.5mg PRN  SCD/Lovenox qd  CIWA

## 2023-10-23 NOTE — PROVIDER CONTACT NOTE (OTHER) - ASSESSMENT
No complaint of dizziness. Was asleep and awakened for VS check. Voided only 100 ml dark lin urine since 8 pm. Admitted drinking only 1 full cup of water as well.

## 2023-10-23 NOTE — DISCHARGE NOTE NURSING/CASE MANAGEMENT/SOCIAL WORK - NSDCPEFALRISK_GEN_ALL_CORE
For information on Fall & Injury Prevention, visit: [email or web address] OR  [email or web address] OR  https://www.cdc.gov/steadi/patient.html

## 2023-10-23 NOTE — PROGRESS NOTE ADULT - PROVIDER SPECIALTY LIST ADULT
Gastroenterology
Surgery
Gastroenterology
SICU
Surgery
Internal Medicine
Surgery
Internal Medicine
Surgery
Surgery

## 2023-10-23 NOTE — PROGRESS NOTE ADULT - REASON FOR ADMISSION
Abdominal pain with sepsis

## 2023-10-23 NOTE — PROGRESS NOTE ADULT - SUBJECTIVE AND OBJECTIVE BOX
SUBJECTIVE: Patient seen and examined bedside. Pain better controlled. Having BM. No N/V. Received 500cc bolus for low UOP ON.           cefTRIAXone   IVPB 1000 milliGRAM(s) IV Intermittent every 24 hours  enoxaparin Injectable 40 milliGRAM(s) SubCutaneous every 24 hours  metroNIDAZOLE  IVPB 500 milliGRAM(s) IV Intermittent every 8 hours      Vital Signs Last 24 Hrs  T(C): 36.7 (23 Oct 2023 06:05), Max: 37.1 (22 Oct 2023 14:24)  T(F): 98 (23 Oct 2023 06:05), Max: 98.8 (22 Oct 2023 14:24)  HR: 86 (23 Oct 2023 06:05) (86 - 104)  BP: 115/77 (23 Oct 2023 06:05) (93/54 - 153/96)  BP(mean): --  RR: 12 (23 Oct 2023 06:05) (12 - 20)  SpO2: 96% (23 Oct 2023 06:05) (92% - 99%)    Parameters below as of 23 Oct 2023 06:05  Patient On (Oxygen Delivery Method): room air      I&O's Detail    22 Oct 2023 07:01  -  23 Oct 2023 07:00  --------------------------------------------------------  IN:    IV PiggyBack: 100 mL    IV PiggyBack: 50 mL    IV PiggyBack: 350 mL    Lactated Ringers Bolus: 500 mL    Oral Fluid: 150 mL  Total IN: 1150 mL    OUT:    Voided (mL): 200 mL  Total OUT: 200 mL    Total NET: 950 mL        Exam:  General: NAD, resting comfortably in bed  C/V: NSR  Pulm: Nonlabored breathing, no respiratory distress  Abd: mTTP in b/l lower quadrants, moderate distension.   Extrem: WWP, no edema, SCDs in place        LABS:                        10.9   14.62 )-----------( 295      ( 22 Oct 2023 05:30 )             35.6     10-22    138  |  107  |  5<L>  ----------------------------<  135<H>  4.2   |  18<L>  |  0.53    Ca    8.7      22 Oct 2023 05:30  Phos  2.5     10-22  Mg     1.9     10-22        Urinalysis Basic - ( 22 Oct 2023 05:30 )    Color: x / Appearance: x / SG: x / pH: x  Gluc: 135 mg/dL / Ketone: x  / Bili: x / Urobili: x   Blood: x / Protein: x / Nitrite: x   Leuk Esterase: x / RBC: x / WBC x   Sq Epi: x / Non Sq Epi: x / Bacteria: x        RADIOLOGY & ADDITIONAL STUDIES:

## 2023-10-23 NOTE — PROGRESS NOTE ADULT - ASSESSMENT
47 yo F with PMHx of ADHD, anxiety and PSHx of remote appendectomy who presented with acute onset abdominal pain, admitted for severe sepsis (tachycadria, bandemia to 19%, respiratory rate in the 30's, elevated lactate, source/suspected enteritis admitted for SICU for HD instability now stepped down to surgical service for further management       SBO   hyponatremia, hypokalemia , hypophosphatemia   na improved to 138-- k 4.2, phosp 2.5  resolved after replacement    CT repeat on 10/21 : Persistent findings suggesting a partial or resolving small bowel   obstruction. Persistent ileal thickening suggesting ileitis, differential including infectious versus inflammatory etiology. Abdominal pelvic ascites with early organization of the lower abdominal and pelvic ascites.  rest of the care per primary team     severe sepsis on admission -likely source abdomen -enteritis ( diarrhea )   bandemia  initial CT abd /pelvis cw enteritis -- repeat 10/19 CT w SBO --  stool sample O/P neg, ruled out c diff (neg) on admission   was on BS antibiotics with Zosyn- dc'd - now developed diarrhea and elevated wbc + worsening abdominal pain ----    NEGATIVE c d.ff and GI PCR 10/21  NGTD--fu blood cx    WBC 17 --15k-11k  --- 15k --14K- 15k   lactic acidosis resolved after fluid resuscitation 7--> 1.6 ( 2/2 bowel hypoperfusion 2/2 hypotension)   though hypotension responsive to IVF , she remains tachycardic   GI - recommended to cont a short course of abx for suspected infectious enteritis     new gonorrhea positive   s/p rocephin IM 1 dose 10/18      anxiety   on Seroquel, saphris at home   oral meds can be resumed when tolerating po meds/diet   would get psych to weight in for Saprhis       ADHD  Patient reports 60mg vyvance daily at home. on hold     substance use disorder  pt uses [facility]-   utox positive for thc, amphetamine( takes Vyvanse), opioids  received opioids in ED      dvt ppx lovenox qd

## 2023-10-23 NOTE — PROGRESS NOTE ADULT - ASSESSMENT
48F with PMH of HTN, HLD and anxiety and PSHx of remote appendectomy, who first presented for abdominal pain after sexual intercourse. Admitted to surgery for treatment of SBO, which has since resolved. Continues to have abdominal pain and diarrhea, so GI consulted due to possible concern for IBD.     Given recent colonoscopy (August 2023) which was largely normal and no prior history of GI related issues, do not suspect IBD at this time. Suspect symptoms most likely 2/2 to infectious process, like gastroenteritis given patient came in with tachycardia, fever, and bandemia.      CT abdomen/pelvis: Persistent findings suggesting a partial or resolving small bowel obstruction. Persistent ileal thickening suggesting ileitis, differential including infectious versus inflammatory etiology. Abdominal pelvic ascites with early organization of the lower abdominal and pelvic ascites.    Recommendations:   - c diff, stool culture, and GI PCR negative (but GI PCR sent after patient received a course of abx)   - repeat CT abdomen/pelvis showing partial or resolving small bowel obstruction and ileitis   - currently on ceftriaxone/flagyl; abx as per primary team   - overall patient has improved and will likely take time for patient to fully recover   - no contraindication to discharge if patient can tolerate diet   - ensure out-patient follow-up with Dr. Michelle (patient's GI)     Case discussed with Dr. Griffith. GI Team will continue to follow.     Crystal Mak D.O.   Gastroenterology Fellow  Weekday 7am-5pm Pager: [phone]  Weeknights/Weekend/Holiday Coverage: Please call the  for contact info

## 2023-10-23 NOTE — PROGRESS NOTE ADULT - SUBJECTIVE AND OBJECTIVE BOX
Patient is a 48y old  Female who presents with a chief complaint of Abdominal pain with sepsis (23 Oct 2023 07:16)      INTERVAL HPI/OVERNIGHT EVENTS: abdominal pain improved     MEDICATIONS  (STANDING):  acetaminophen     Tablet .. 650 milliGRAM(s) Oral every 6 hours  enoxaparin Injectable 40 milliGRAM(s) SubCutaneous every 24 hours  folic acid Injectable 1 milliGRAM(s) IV Push daily  influenza   Vaccine 0.5 milliLiter(s) IntraMuscular once  ketorolac   Injectable 30 milliGRAM(s) IV Push every 8 hours  pantoprazole  Injectable 40 milliGRAM(s) IV Push daily  potassium chloride   Powder 20 milliEquivalent(s) Oral once  QUEtiapine 100 milliGRAM(s) Oral daily  thiamine Injectable 100 milliGRAM(s) IV Push daily    MEDICATIONS  (PRN):  benzocaine/menthol Lozenge 1 Lozenge Oral four times a day PRN Sore Throat  HYDROmorphone  Injectable 0.25 milliGRAM(s) IV Push every 6 hours PRN Break through pain  LORazepam   Injectable 0.5 milliGRAM(s) IV Push every 4 hours PRN Anxiety  oxyCODONE    IR 10 milliGRAM(s) Oral every 6 hours PRN Severe Pain (7 - 10)  oxyCODONE    IR 5 milliGRAM(s) Oral every 6 hours PRN Moderate Pain (4 - 6)      __________________________________________________  REVIEW OF SYSTEMS:    CONSTITUTIONAL: No fever,   EYES: no acute visual disturbances  NECK: No pain or stiffness  RESPIRATORY: No cough; No shortness of breath  CARDIOVASCULAR: No chest pain, no palpitations  GASTROINTESTINAL: No pain. No nausea or vomiting; No diarrhea   NEUROLOGICAL: No headache or numbness, no tremors  MUSCULOSKELETAL: No joint pain, no muscle pain  GENITOURINARY: no dysuria, no frequency, no hesitancy  PSYCHIATRY: no depression , no anxiety  ALL OTHER  ROS negative        Vital Signs Last 24 Hrs  T(C): 36.4 (23 Oct 2023 09:38), Max: 37.1 (22 Oct 2023 14:24)  T(F): 97.6 (23 Oct 2023 09:38), Max: 98.8 (22 Oct 2023 14:24)  HR: 88 (23 Oct 2023 09:38) (86 - 104)  BP: 125/83 (23 Oct 2023 09:38) (93/54 - 153/96)  BP(mean): --  RR: 16 (23 Oct 2023 09:38) (12 - 20)  SpO2: 98% (23 Oct 2023 09:38) (92% - 99%)    Parameters below as of 23 Oct 2023 09:38  Patient On (Oxygen Delivery Method): room air        ________________________________________________  PHYSICAL EXAM:  GENERAL: NAD  HEENT: Normocephalic;  conjunctivae and sclerae clear; moist mucous membranes;   NECK : supple  CHEST/LUNG: Clear to auscultation bilaterally with good air entry   HEART: S1 S2  regular; no murmurs, gallops or rubs  ABDOMEN: Soft, tenderness improved, +distended;   EXTREMITIES: no cyanosis; no edema; no calf tenderness  SKIN: warm and dry; no rash  NERVOUS SYSTEM:  Awake and alert; Oriented  to place, person and time ; no new deficits    _________________________________________________  LABS:                        9.8    15.61 )-----------( 493      ( 23 Oct 2023 05:30 )             29.9     10-23    137  |  104  |  8   ----------------------------<  107<H>  3.6   |  25  |  0.62    Ca    8.3<L>      23 Oct 2023 05:30  Phos  3.7     10-23  Mg     1.8     10-23        Urinalysis Basic - ( 23 Oct 2023 05:30 )    Color: x / Appearance: x / SG: x / pH: x  Gluc: 107 mg/dL / Ketone: x  / Bili: x / Urobili: x   Blood: x / Protein: x / Nitrite: x   Leuk Esterase: x / RBC: x / WBC x   Sq Epi: x / Non Sq Epi: x / Bacteria: x      CAPILLARY BLOOD GLUCOSE            RADIOLOGY & ADDITIONAL TESTS:      Plan of care was discussed with patient and /or primary care giver; all questions and concerns were addressed and care was aligned with patient's wishes.

## 2023-10-23 NOTE — PROGRESS NOTE ADULT - ATTENDING COMMENTS
Pt feeling better, anxious to tolerate solids and go home.  Suspect this is all an acute infectious process.  D/c home if tolerating diet.  Outpatient f/u with GI at [facility] on d/c to further evaluate possibility of IBD.  D/w patient.
Patient improved today, decreased pain and distension, jose martin CLD, WBC trending down, ambulating + flatus.  Will d/c antibiotics, trend WBC in am, and as long as no set backs plan d/c for tomorrow.
Patient seen and examined, imaging reviewed.    Reports having colonoscopy 2 months ago with polyp that was to be resected by Dr. Hayward.  Feels better than yesterday, less bloating.  Having BMs and flatus  AFVSS  Abd soft, mod distended, NT.    WBC normal  GI PCR negative, C diff negative    CTAP demonstrates small bowel dilation, wall thickening in ileum.      A/P: Terminal ileitis.  Likely infectious, less likely IBD.  This is not ischemic  1. Continue abx  2. On full liquids  3. Encouraged ambulation  4. Obtain colonscopy report from Dr. Michelle.
Abdomen soft, BS+, Flatus+, BM+ x1, no guarding, no rebound , OOB to ambulate, F/U LABS, VS
Pt seen 10/17 agree. pain improved, still some soft distension. feeling hungry, comfortable. lactate trending down, will cont to trend. awaiting stool studies. Appreciate ICU/medicine input.
Pt seen and CT reviewed. Yesterday was more distended and vomited CLD. Today with NGT decreased distension and pain. CT with likely pSBO vs ileus. Overall clinically appears likely that initial insult to bowel was either viral or ischemic however that appears to have resolving in terms of the initial improvement, normalized lactate and decreased WBC. There may be some RLQ adhesions from prior lap appy that is now involved with the inflammation. Will repeat KUB later today to monitor contrast progression and cont NGT to LCWS, pain control, IVF, IV antibtiotics.
Patient seen, examined, and discussed with Dr. Mak. Agree with above. 48F with a h/o HTN, HLD and anxiety and PSHx of remote appendectomy, who first presented for abdominal pain after sexual intercourse. Admitted to surgery for treatment of SBO, which has since resolved. Continues to have abdominal pain and diarrhea, so GI consulted due to possible concern for IBD. Patient's initial presentation and prior GI history argues strongly against IBD. Despite negative stool studies, would still argue that this is an infectious source. Has been placed back on abx for elevated WBC, which I expect to come down regardless of abx so would keep to short duration.     Benton Rincon MD  Gastroenterology

## 2023-10-30 DIAGNOSIS — E87.1 HYPO-OSMOLALITY AND HYPONATREMIA: ICD-10-CM

## 2023-10-30 DIAGNOSIS — Z90.49 ACQUIRED ABSENCE OF OTHER SPECIFIED PARTS OF DIGESTIVE TRACT: ICD-10-CM

## 2023-10-30 DIAGNOSIS — Z98.82 BREAST IMPLANT STATUS: ICD-10-CM

## 2023-10-30 DIAGNOSIS — R65.20 SEVERE SEPSIS WITHOUT SEPTIC SHOCK: ICD-10-CM

## 2023-10-30 DIAGNOSIS — E87.6 HYPOKALEMIA: ICD-10-CM

## 2023-10-30 DIAGNOSIS — E78.5 HYPERLIPIDEMIA, UNSPECIFIED: ICD-10-CM

## 2023-10-30 DIAGNOSIS — M10.9 GOUT, UNSPECIFIED: ICD-10-CM

## 2023-10-30 DIAGNOSIS — E83.39 OTHER DISORDERS OF PHOSPHORUS METABOLISM: ICD-10-CM

## 2023-10-30 DIAGNOSIS — I10 ESSENTIAL (PRIMARY) HYPERTENSION: ICD-10-CM

## 2023-10-30 DIAGNOSIS — A09 INFECTIOUS GASTROENTERITIS AND COLITIS, UNSPECIFIED: ICD-10-CM

## 2023-10-30 DIAGNOSIS — F90.9 ATTENTION-DEFICIT HYPERACTIVITY DISORDER, UNSPECIFIED TYPE: ICD-10-CM

## 2023-10-30 DIAGNOSIS — Z98.1 ARTHRODESIS STATUS: ICD-10-CM

## 2023-10-30 DIAGNOSIS — A41.9 SEPSIS, UNSPECIFIED ORGANISM: ICD-10-CM

## 2023-10-30 DIAGNOSIS — A54.9 GONOCOCCAL INFECTION, UNSPECIFIED: ICD-10-CM

## 2023-10-30 DIAGNOSIS — Z86.16 PERSONAL HISTORY OF COVID-19: ICD-10-CM

## 2023-10-30 DIAGNOSIS — E87.20 ACIDOSIS, UNSPECIFIED: ICD-10-CM

## 2023-10-30 DIAGNOSIS — K63.5 POLYP OF COLON: ICD-10-CM

## 2023-10-30 DIAGNOSIS — F17.210 NICOTINE DEPENDENCE, CIGARETTES, UNCOMPLICATED: ICD-10-CM

## 2023-10-30 DIAGNOSIS — F41.9 ANXIETY DISORDER, UNSPECIFIED: ICD-10-CM

## 2023-10-30 DIAGNOSIS — E80.7 DISORDER OF BILIRUBIN METABOLISM, UNSPECIFIED: ICD-10-CM

## 2023-10-30 DIAGNOSIS — R74.01 ELEVATION OF LEVELS OF LIVER TRANSAMINASE LEVELS: ICD-10-CM

## 2023-10-30 DIAGNOSIS — K56.609 UNSPECIFIED INTESTINAL OBSTRUCTION, UNSPECIFIED AS TO PARTIAL VERSUS COMPLETE OBSTRUCTION: ICD-10-CM

## 2023-11-15 ENCOUNTER — EMERGENCY (EMERGENCY)
Facility: HOSPITAL | Age: 48
LOS: 1 days | Discharge: HOME | End: 2023-11-15
Attending: STUDENT IN AN ORGANIZED HEALTH CARE EDUCATION/TRAINING PROGRAM | Admitting: STUDENT IN AN ORGANIZED HEALTH CARE EDUCATION/TRAINING PROGRAM
Payer: COMMERCIAL

## 2023-11-15 VITALS
SYSTOLIC BLOOD PRESSURE: 103 MMHG | OXYGEN SATURATION: 100 % | HEART RATE: 99 BPM | RESPIRATION RATE: 17 BRPM | DIASTOLIC BLOOD PRESSURE: 65 MMHG

## 2023-11-15 VITALS
HEIGHT: 64 IN | HEART RATE: 108 BPM | DIASTOLIC BLOOD PRESSURE: 65 MMHG | RESPIRATION RATE: 18 BRPM | OXYGEN SATURATION: 100 % | SYSTOLIC BLOOD PRESSURE: 93 MMHG

## 2023-11-15 DIAGNOSIS — Z90.49 ACQUIRED ABSENCE OF OTHER SPECIFIED PARTS OF DIGESTIVE TRACT: Chronic | ICD-10-CM

## 2023-11-15 LAB
ALBUMIN SERPL ELPH-MCNC: 3.9 G/DL — SIGNIFICANT CHANGE UP (ref 3.3–5)
ALBUMIN SERPL ELPH-MCNC: 3.9 G/DL — SIGNIFICANT CHANGE UP (ref 3.3–5)
ALP SERPL-CCNC: 125 U/L — HIGH (ref 40–120)
ALP SERPL-CCNC: 125 U/L — HIGH (ref 40–120)
ALT FLD-CCNC: 48 U/L — HIGH (ref 10–45)
ALT FLD-CCNC: 48 U/L — HIGH (ref 10–45)
AMPHET UR-MCNC: POSITIVE
AMPHET UR-MCNC: POSITIVE
ANION GAP SERPL CALC-SCNC: 9 MMOL/L — SIGNIFICANT CHANGE UP (ref 5–17)
ANION GAP SERPL CALC-SCNC: 9 MMOL/L — SIGNIFICANT CHANGE UP (ref 5–17)
APPEARANCE UR: CLEAR — SIGNIFICANT CHANGE UP
APPEARANCE UR: CLEAR — SIGNIFICANT CHANGE UP
APTT BLD: 27.8 SEC — SIGNIFICANT CHANGE UP (ref 24.5–35.6)
APTT BLD: 27.8 SEC — SIGNIFICANT CHANGE UP (ref 24.5–35.6)
AST SERPL-CCNC: 38 U/L — SIGNIFICANT CHANGE UP (ref 10–40)
AST SERPL-CCNC: 38 U/L — SIGNIFICANT CHANGE UP (ref 10–40)
BACTERIA # UR AUTO: ABNORMAL /HPF
BACTERIA # UR AUTO: ABNORMAL /HPF
BARBITURATES UR SCN-MCNC: NEGATIVE — SIGNIFICANT CHANGE UP
BARBITURATES UR SCN-MCNC: NEGATIVE — SIGNIFICANT CHANGE UP
BASOPHILS # BLD AUTO: 0.07 K/UL — SIGNIFICANT CHANGE UP (ref 0–0.2)
BASOPHILS # BLD AUTO: 0.07 K/UL — SIGNIFICANT CHANGE UP (ref 0–0.2)
BASOPHILS NFR BLD AUTO: 0.8 % — SIGNIFICANT CHANGE UP (ref 0–2)
BASOPHILS NFR BLD AUTO: 0.8 % — SIGNIFICANT CHANGE UP (ref 0–2)
BENZODIAZ UR-MCNC: NEGATIVE — SIGNIFICANT CHANGE UP
BENZODIAZ UR-MCNC: NEGATIVE — SIGNIFICANT CHANGE UP
BILIRUB SERPL-MCNC: 0.3 MG/DL — SIGNIFICANT CHANGE UP (ref 0.2–1.2)
BILIRUB SERPL-MCNC: 0.3 MG/DL — SIGNIFICANT CHANGE UP (ref 0.2–1.2)
BILIRUB UR-MCNC: NEGATIVE — SIGNIFICANT CHANGE UP
BILIRUB UR-MCNC: NEGATIVE — SIGNIFICANT CHANGE UP
BUN SERPL-MCNC: 11 MG/DL — SIGNIFICANT CHANGE UP (ref 7–23)
BUN SERPL-MCNC: 11 MG/DL — SIGNIFICANT CHANGE UP (ref 7–23)
CALCIUM SERPL-MCNC: 9.2 MG/DL — SIGNIFICANT CHANGE UP (ref 8.4–10.5)
CALCIUM SERPL-MCNC: 9.2 MG/DL — SIGNIFICANT CHANGE UP (ref 8.4–10.5)
CAST: 1 /LPF — SIGNIFICANT CHANGE UP (ref 0–4)
CAST: 1 /LPF — SIGNIFICANT CHANGE UP (ref 0–4)
CHLORIDE SERPL-SCNC: 105 MMOL/L — SIGNIFICANT CHANGE UP (ref 96–108)
CHLORIDE SERPL-SCNC: 105 MMOL/L — SIGNIFICANT CHANGE UP (ref 96–108)
CO2 SERPL-SCNC: 25 MMOL/L — SIGNIFICANT CHANGE UP (ref 22–31)
CO2 SERPL-SCNC: 25 MMOL/L — SIGNIFICANT CHANGE UP (ref 22–31)
COCAINE METAB.OTHER UR-MCNC: NEGATIVE — SIGNIFICANT CHANGE UP
COCAINE METAB.OTHER UR-MCNC: NEGATIVE — SIGNIFICANT CHANGE UP
COLOR SPEC: YELLOW — SIGNIFICANT CHANGE UP
COLOR SPEC: YELLOW — SIGNIFICANT CHANGE UP
CREAT SERPL-MCNC: 0.8 MG/DL — SIGNIFICANT CHANGE UP (ref 0.5–1.3)
CREAT SERPL-MCNC: 0.8 MG/DL — SIGNIFICANT CHANGE UP (ref 0.5–1.3)
DIFF PNL FLD: NEGATIVE — SIGNIFICANT CHANGE UP
DIFF PNL FLD: NEGATIVE — SIGNIFICANT CHANGE UP
EGFR: 90 ML/MIN/1.73M2 — SIGNIFICANT CHANGE UP
EGFR: 90 ML/MIN/1.73M2 — SIGNIFICANT CHANGE UP
EOSINOPHIL # BLD AUTO: 0.09 K/UL — SIGNIFICANT CHANGE UP (ref 0–0.5)
EOSINOPHIL # BLD AUTO: 0.09 K/UL — SIGNIFICANT CHANGE UP (ref 0–0.5)
EOSINOPHIL NFR BLD AUTO: 1.1 % — SIGNIFICANT CHANGE UP (ref 0–6)
EOSINOPHIL NFR BLD AUTO: 1.1 % — SIGNIFICANT CHANGE UP (ref 0–6)
ETHANOL SERPL-MCNC: <10 MG/DL — SIGNIFICANT CHANGE UP (ref 0–10)
ETHANOL SERPL-MCNC: <10 MG/DL — SIGNIFICANT CHANGE UP (ref 0–10)
GLUCOSE SERPL-MCNC: 170 MG/DL — HIGH (ref 70–99)
GLUCOSE SERPL-MCNC: 170 MG/DL — HIGH (ref 70–99)
GLUCOSE UR QL: NEGATIVE MG/DL — SIGNIFICANT CHANGE UP
GLUCOSE UR QL: NEGATIVE MG/DL — SIGNIFICANT CHANGE UP
HCG SERPL-ACNC: <0 MIU/ML — SIGNIFICANT CHANGE UP
HCG SERPL-ACNC: <0 MIU/ML — SIGNIFICANT CHANGE UP
HCT VFR BLD CALC: 36.4 % — SIGNIFICANT CHANGE UP (ref 34.5–45)
HCT VFR BLD CALC: 36.4 % — SIGNIFICANT CHANGE UP (ref 34.5–45)
HGB BLD-MCNC: 11.7 G/DL — SIGNIFICANT CHANGE UP (ref 11.5–15.5)
HGB BLD-MCNC: 11.7 G/DL — SIGNIFICANT CHANGE UP (ref 11.5–15.5)
IMM GRANULOCYTES NFR BLD AUTO: 0.5 % — SIGNIFICANT CHANGE UP (ref 0–0.9)
IMM GRANULOCYTES NFR BLD AUTO: 0.5 % — SIGNIFICANT CHANGE UP (ref 0–0.9)
INR BLD: 0.91 — SIGNIFICANT CHANGE UP (ref 0.85–1.18)
INR BLD: 0.91 — SIGNIFICANT CHANGE UP (ref 0.85–1.18)
KETONES UR-MCNC: NEGATIVE MG/DL — SIGNIFICANT CHANGE UP
KETONES UR-MCNC: NEGATIVE MG/DL — SIGNIFICANT CHANGE UP
LEUKOCYTE ESTERASE UR-ACNC: ABNORMAL
LEUKOCYTE ESTERASE UR-ACNC: ABNORMAL
LYMPHOCYTES # BLD AUTO: 1.07 K/UL — SIGNIFICANT CHANGE UP (ref 1–3.3)
LYMPHOCYTES # BLD AUTO: 1.07 K/UL — SIGNIFICANT CHANGE UP (ref 1–3.3)
LYMPHOCYTES # BLD AUTO: 12.6 % — LOW (ref 13–44)
LYMPHOCYTES # BLD AUTO: 12.6 % — LOW (ref 13–44)
MCHC RBC-ENTMCNC: 30.8 PG — SIGNIFICANT CHANGE UP (ref 27–34)
MCHC RBC-ENTMCNC: 30.8 PG — SIGNIFICANT CHANGE UP (ref 27–34)
MCHC RBC-ENTMCNC: 32.1 GM/DL — SIGNIFICANT CHANGE UP (ref 32–36)
MCHC RBC-ENTMCNC: 32.1 GM/DL — SIGNIFICANT CHANGE UP (ref 32–36)
MCV RBC AUTO: 95.8 FL — SIGNIFICANT CHANGE UP (ref 80–100)
MCV RBC AUTO: 95.8 FL — SIGNIFICANT CHANGE UP (ref 80–100)
METHADONE UR-MCNC: NEGATIVE — SIGNIFICANT CHANGE UP
METHADONE UR-MCNC: NEGATIVE — SIGNIFICANT CHANGE UP
MONOCYTES # BLD AUTO: 0.41 K/UL — SIGNIFICANT CHANGE UP (ref 0–0.9)
MONOCYTES # BLD AUTO: 0.41 K/UL — SIGNIFICANT CHANGE UP (ref 0–0.9)
MONOCYTES NFR BLD AUTO: 4.8 % — SIGNIFICANT CHANGE UP (ref 2–14)
MONOCYTES NFR BLD AUTO: 4.8 % — SIGNIFICANT CHANGE UP (ref 2–14)
MUCOUS THREADS # UR AUTO: PRESENT
MUCOUS THREADS # UR AUTO: PRESENT
NEUTROPHILS # BLD AUTO: 6.82 K/UL — SIGNIFICANT CHANGE UP (ref 1.8–7.4)
NEUTROPHILS # BLD AUTO: 6.82 K/UL — SIGNIFICANT CHANGE UP (ref 1.8–7.4)
NEUTROPHILS NFR BLD AUTO: 80.2 % — HIGH (ref 43–77)
NEUTROPHILS NFR BLD AUTO: 80.2 % — HIGH (ref 43–77)
NITRITE UR-MCNC: NEGATIVE — SIGNIFICANT CHANGE UP
NITRITE UR-MCNC: NEGATIVE — SIGNIFICANT CHANGE UP
NRBC # BLD: 0 /100 WBCS — SIGNIFICANT CHANGE UP (ref 0–0)
NRBC # BLD: 0 /100 WBCS — SIGNIFICANT CHANGE UP (ref 0–0)
OPIATES UR-MCNC: NEGATIVE — SIGNIFICANT CHANGE UP
OPIATES UR-MCNC: NEGATIVE — SIGNIFICANT CHANGE UP
PCP SPEC-MCNC: SIGNIFICANT CHANGE UP
PCP SPEC-MCNC: SIGNIFICANT CHANGE UP
PCP UR-MCNC: NEGATIVE — SIGNIFICANT CHANGE UP
PCP UR-MCNC: NEGATIVE — SIGNIFICANT CHANGE UP
PH UR: 6.5 — SIGNIFICANT CHANGE UP (ref 5–8)
PH UR: 6.5 — SIGNIFICANT CHANGE UP (ref 5–8)
PLATELET # BLD AUTO: 332 K/UL — SIGNIFICANT CHANGE UP (ref 150–400)
PLATELET # BLD AUTO: 332 K/UL — SIGNIFICANT CHANGE UP (ref 150–400)
POTASSIUM SERPL-MCNC: 4.5 MMOL/L — SIGNIFICANT CHANGE UP (ref 3.5–5.3)
POTASSIUM SERPL-MCNC: 4.5 MMOL/L — SIGNIFICANT CHANGE UP (ref 3.5–5.3)
POTASSIUM SERPL-SCNC: 4.5 MMOL/L — SIGNIFICANT CHANGE UP (ref 3.5–5.3)
POTASSIUM SERPL-SCNC: 4.5 MMOL/L — SIGNIFICANT CHANGE UP (ref 3.5–5.3)
PROT SERPL-MCNC: 6.8 G/DL — SIGNIFICANT CHANGE UP (ref 6–8.3)
PROT SERPL-MCNC: 6.8 G/DL — SIGNIFICANT CHANGE UP (ref 6–8.3)
PROT UR-MCNC: NEGATIVE MG/DL — SIGNIFICANT CHANGE UP
PROT UR-MCNC: NEGATIVE MG/DL — SIGNIFICANT CHANGE UP
PROTHROM AB SERPL-ACNC: 10.4 SEC — SIGNIFICANT CHANGE UP (ref 9.5–13)
PROTHROM AB SERPL-ACNC: 10.4 SEC — SIGNIFICANT CHANGE UP (ref 9.5–13)
RBC # BLD: 3.8 M/UL — SIGNIFICANT CHANGE UP (ref 3.8–5.2)
RBC # BLD: 3.8 M/UL — SIGNIFICANT CHANGE UP (ref 3.8–5.2)
RBC # FLD: 12.3 % — SIGNIFICANT CHANGE UP (ref 10.3–14.5)
RBC # FLD: 12.3 % — SIGNIFICANT CHANGE UP (ref 10.3–14.5)
RBC CASTS # UR COMP ASSIST: 2 /HPF — SIGNIFICANT CHANGE UP (ref 0–4)
RBC CASTS # UR COMP ASSIST: 2 /HPF — SIGNIFICANT CHANGE UP (ref 0–4)
SODIUM SERPL-SCNC: 139 MMOL/L — SIGNIFICANT CHANGE UP (ref 135–145)
SODIUM SERPL-SCNC: 139 MMOL/L — SIGNIFICANT CHANGE UP (ref 135–145)
SP GR SPEC: >1.03 — HIGH (ref 1–1.03)
SP GR SPEC: >1.03 — HIGH (ref 1–1.03)
SQUAMOUS # UR AUTO: >36 /HPF — HIGH (ref 0–5)
SQUAMOUS # UR AUTO: >36 /HPF — HIGH (ref 0–5)
THC UR QL: POSITIVE
THC UR QL: POSITIVE
UROBILINOGEN FLD QL: 0.2 MG/DL — SIGNIFICANT CHANGE UP (ref 0.2–1)
UROBILINOGEN FLD QL: 0.2 MG/DL — SIGNIFICANT CHANGE UP (ref 0.2–1)
WBC # BLD: 8.5 K/UL — SIGNIFICANT CHANGE UP (ref 3.8–10.5)
WBC # BLD: 8.5 K/UL — SIGNIFICANT CHANGE UP (ref 3.8–10.5)
WBC # FLD AUTO: 8.5 K/UL — SIGNIFICANT CHANGE UP (ref 3.8–10.5)
WBC # FLD AUTO: 8.5 K/UL — SIGNIFICANT CHANGE UP (ref 3.8–10.5)
WBC UR QL: 14 /HPF — HIGH (ref 0–5)
WBC UR QL: 14 /HPF — HIGH (ref 0–5)

## 2023-11-15 PROCEDURE — 70450 CT HEAD/BRAIN W/O DYE: CPT | Mod: 26,MA,59

## 2023-11-15 PROCEDURE — 99285 EMERGENCY DEPT VISIT HI MDM: CPT

## 2023-11-15 PROCEDURE — 70450 CT HEAD/BRAIN W/O DYE: CPT | Mod: MA

## 2023-11-15 PROCEDURE — 0042T: CPT | Mod: MA

## 2023-11-15 PROCEDURE — 93005 ELECTROCARDIOGRAM TRACING: CPT

## 2023-11-15 PROCEDURE — 84702 CHORIONIC GONADOTROPIN TEST: CPT

## 2023-11-15 PROCEDURE — 99285 EMERGENCY DEPT VISIT HI MDM: CPT | Mod: 25

## 2023-11-15 PROCEDURE — 70498 CT ANGIOGRAPHY NECK: CPT | Mod: 26,MA

## 2023-11-15 PROCEDURE — 85730 THROMBOPLASTIN TIME PARTIAL: CPT

## 2023-11-15 PROCEDURE — 87086 URINE CULTURE/COLONY COUNT: CPT

## 2023-11-15 PROCEDURE — 70498 CT ANGIOGRAPHY NECK: CPT | Mod: MA

## 2023-11-15 PROCEDURE — 80307 DRUG TEST PRSMV CHEM ANLYZR: CPT

## 2023-11-15 PROCEDURE — 70496 CT ANGIOGRAPHY HEAD: CPT | Mod: 26,MA

## 2023-11-15 PROCEDURE — 85025 COMPLETE CBC W/AUTO DIFF WBC: CPT

## 2023-11-15 PROCEDURE — 82962 GLUCOSE BLOOD TEST: CPT

## 2023-11-15 PROCEDURE — 36415 COLL VENOUS BLD VENIPUNCTURE: CPT

## 2023-11-15 PROCEDURE — 81001 URINALYSIS AUTO W/SCOPE: CPT

## 2023-11-15 PROCEDURE — 80053 COMPREHEN METABOLIC PANEL: CPT

## 2023-11-15 PROCEDURE — 70496 CT ANGIOGRAPHY HEAD: CPT | Mod: MA

## 2023-11-15 PROCEDURE — 85610 PROTHROMBIN TIME: CPT

## 2023-11-15 RX ORDER — SODIUM CHLORIDE 9 MG/ML
1000 INJECTION INTRAMUSCULAR; INTRAVENOUS; SUBCUTANEOUS ONCE
Refills: 0 | Status: COMPLETED | OUTPATIENT
Start: 2023-11-15 | End: 2023-11-15

## 2023-11-15 RX ADMIN — SODIUM CHLORIDE 1000 MILLILITER(S): 9 INJECTION INTRAMUSCULAR; INTRAVENOUS; SUBCUTANEOUS at 17:15

## 2023-11-15 NOTE — ED PROVIDER NOTE - CLINICAL SUMMARY MEDICAL DECISION MAKING FREE TEXT BOX
47 yo with dizziness, slow speech, questionable pronator drift on RUE, FS wnl, mild hypotension, ordered for IV hydration. stroke code activated, CT imaging, labs, infectious workup, reassess.

## 2023-11-15 NOTE — ED PROVIDER NOTE - PHYSICAL EXAMINATION
general: Well appearing, in no acute distress  HEENT: Normocephalic, atraumatic, extraocular movements intact  CV: Regular rate  Pulm: No respiratory distress, no tachypnea  Abd: Flat, no gross distension  Ext: warm and well perfused  Skin: No gross rashes or lesions  Neuro: Alert and oriented, moving all extremities, slow speech, sensation intact, motor intact other than mild pronator drift on RUE, stiff, CN II-XII intact

## 2023-11-15 NOTE — ED PROVIDER NOTE - PATIENT PORTAL LINK FT
You can access the FollowMyHealth Patient Portal offered by [facility] by registering at the following website: [email or web address]. By joining [employer]’s FollowMyHealth portal, you will also be able to view your health information using other applications (apps) compatible with our system.

## 2023-11-15 NOTE — ED PROVIDER NOTE - PROGRESS NOTE DETAILS
Pt feels well, no complaints, speech improved, back to baseline. Per neuro, do not suspect CVA. Recommend labs, UA. If neg and pt stable/improves, ok for dc home with pcp fu. Pt at baseline, feels well, no complaints, UTox + THC and amphetamine. At baseline. UA with WBCs but squams, NO urinary complaints, pending UCx. Will dc with return precautions.

## 2023-11-15 NOTE — ED ADULT NURSE NOTE - NSFALLRISKINTERV_ED_ALL_ED

## 2023-11-15 NOTE — ED ADULT NURSE NOTE - OBJECTIVE STATEMENT
48F PMH HTN, HLD and anxiety presents BIBA c/o sudden onset of dizziness, unsteady gait, hypotension and slurred speech while at the [facility] for a swim lesson. pt seen as UPGR[facility] with Dr. Guerrero. CODE STROKE activated. pt taken to CT on portable cardiac monitor with writeMD pepe and Neuro PA. pt states she last felt normal at 1530. denies ETOH/drug use, n/v, fever/chills, LOC, cough/congestion, CP/SOB, blurred vision, headache or numbness/tingling. upon initial assessment, pt lethargic with difficulty keeping eyes open and with slowed speech. after CT scan, pt able to ambulate independently with steady gait, is more awake and is speaking in clear, complete sentences. RR easy, even, un-labored on room air

## 2023-11-15 NOTE — CONSULT NOTE ADULT - SUBJECTIVE AND OBJECTIVE BOX
**STROKE CODE CONSULT NOTE**    Last known well time/Time of onset of symptoms: 2:30 pm    HPI: 48y Female with PMHx of HTN, HLD, anxiety, recent admission 10/2023 for abdominal pain found to have partial SBO (medically treated), presented to [facility] from the [facility] for dizziness. Pt states that she was in her usual state of health today, went to the [facility] for her swim lesson. [facility] and when she got to the pool she started to feel dizzy. She said someone must've called an ambulance. EMS reports that pt was dizzy, had BPs in the 80s on arrival, and when they tried to stand her up they had to immediately sit her in the stretcher. In the ER, pt initially slow to respond but responding appropriately. Says she is feeling back to normal. Says she uses marijuana occasionally but none today. Denies any drug use. Says she has not eaten yet today. Denies any other symptoms and says her dizziness has resolved. After CT scan, BP noted to be in the 110s, pt talking fully and saying she feels well.     T(C): 36.3 (11-15-23 @ 17:13), Max: 36.3 (11-15-23 @ 17:13)  HR: 109 (11-15-23 @ 17:13) (108 - 109)  BP: 95/58 (11-15-23 @ 17:13) (93/65 - 95/58)  RR: 17 (11-15-23 @ 17:13) (17 - 18)  SpO2: 97% (11-15-23 @ 17:13) (97% - 100%)    PAST MEDICAL & SURGICAL HISTORY:  COVID-19      History of laparoscopic appendectomy          FAMILY HISTORY:      SOCIAL HISTORY:   Drinking: denies  Drug Use: denies    ROS:   Constitutional: No fever, weight loss or fatigue  Eyes: No eye pain, visual disturbances, or discharge  ENMT:  No difficulty hearing, tinnitus; No sinus or throat pain  Neck: No pain or stiffness  Respiratory: No cough, wheezing, chills or hemoptysis  Cardiovascular: No chest pain, palpitations, shortness of breath, or leg swelling  Gastrointestinal: No abdominal pain. No nausea, vomiting or hematemesis; No diarrhea or constipation. Nohematochezia.  Genitourinary: No dysuria, frequency, hematuria or incontinence  Neurological: As per HPI    MEDICATIONS  (STANDING):    MEDICATIONS  (PRN):    Allergies    No Known Allergies    Intolerances      Vital Signs Last 24 Hrs  T(C): 36.3 (15 Nov 2023 17:13), Max: 36.3 (15 Nov 2023 17:13)  T(F): 97.4 (15 Nov 2023 17:13), Max: 97.4 (15 Nov 2023 17:13)  HR: 109 (15 Nov 2023 17:13) (108 - 109)  BP: 95/58 (15 Nov 2023 17:13) (93/65 - 95/58)  BP(mean): --  RR: 17 (15 Nov 2023 17:13) (17 - 18)  SpO2: 97% (15 Nov 2023 17:13) (97% - 100%)    Parameters below as of 15 Nov 2023 17:13  Patient On (Oxygen Delivery Method): room air        Physical exam:  Constitutional: No acute distress, conversant  Eyes: Anicteric sclerae, moist conjunctivae, see below for CNs  Neck: trachea midline    Neurologic:  -Mental status: Slightly lethargic at first, later fully awake, alert, oriented to person, age, and month. Initially with slow speech and after CT scan, speech was fluent with intact naming, repetition, and comprehension, no dysarthria. Recent and remote memory intact. Follows commands. Attention/concentration intact. Fund of knowledge appropriate.  -Cranial nerves:   II: Visual fields are full to confrontation.  III, IV, VI: Extraocular movements are intact without nystagmus. Pupils equally round and reactive to light  VII: Face appears symmetric  Motor: Normal bulk and tone. Initially with slight pronation of right hand without drift (though pt was very stiff in the arm). On second exam without pronation, all extremities 5/5  Sensation: Intact to light touch bilaterally. No neglect or extinction on double simultaneous testing.  Coordination: No dysmetria on finger-to-nose     NIHSS: 1    Fingerstick Blood Glucose: CAPILLARY BLOOD GLUCOSE  144 (15 Nov 2023 17:11)      POCT Blood Glucose.: 144 mg/dL (15 Nov 2023 16:34)    LABS:                        11.7   8.50  )-----------( 332      ( 15 Nov 2023 17:17 )             36.4                     RADIOLOGY & ADDITIONAL STUDIES:    < from: CT Brain Stroke Protocol (11.15.23 @ 16:58) >  IMPRESSION:    No acute findings.    < end of copied text >    -----------------------------------------------------------------------------------------------------------------  IV-tPA (Y/N):   No  Reason thrombolytic not given: symptoms resolved   patient refused not examined

## 2023-11-15 NOTE — ED PROVIDER NOTE - NS ED ROS FT
Constitutional: No fever or chills  Eyes: No discharge or drainage  Ears, Nose, Mouth, Throat: No nasal discharge, no sore throat  Cardiovascular: No chest pain, no palpitations  Respiratory: No shortness of breath, no cough  Gastrointestinal: No nausea or vomiting, no abdominal pain, no diarrhea or constipation  Musculoskeletal: No joint pain, no swelling  Skin: No rashes or lesions  Neurological: No numbness, weakness, tingling, no headache, dizziness  Psychiatric: No depression

## 2023-11-15 NOTE — ED PROVIDER NOTE - NSFOLLOWUPINSTRUCTIONS_ED_ALL_ED_FT
Please return for worsening symptoms, dizziness, headache, blurry vision, numbness, weakness, tingling. Please follow up closely with your primary physician.    I hope you feel better soon!    Sincerely,  Suraj Guerrero MD

## 2023-11-15 NOTE — CONSULT NOTE ADULT - ASSESSMENT
48y Female with PMHx of HTN, HLD, anxiety, recent admission 10/2023 for abdominal pain found to have partial SBO (medically treated), presented to [facility] from the [facility] for dizziness in the setting of a blood pressure in the 80s. Improving blood pressure and mental status.    -CT head without acute findings  -please follow up final read for CTAs  -f/u labwork  -if CTAs normal, no need for admission from neurologic standpoint - pt can follow up with her primary care doctor.    Discussed with Dr. Hernandez

## 2023-11-15 NOTE — ED PROVIDER NOTE - OBJECTIVE STATEMENT
Patient is a 48F with PMHx of HTN, HLD and anxiety and PSHx of remote appendectomy with recent partial SBO presenting with dizziness, slurred speech. Pt reportedly at baseline, went to [facility] for swim lesson, felt dizzy, unsteady and had slurred speech, last known well 1 hour PTA. Pt denies headache, blurry vision, dizziness, Patient is a 48F with PMHx of HTN, HLD and anxiety and PSHx of remote appendectomy with recent partial SBO presenting with dizziness, slurred speech. Pt reportedly at baseline, went to [facility] for swim lesson, felt dizzy, unsteady and had slurred speech, last known well 3:30 PM.  Pt denies headache, blurry vision. No numbness, weakness, tingling. Patient is a 48F with PMHx of HTN, HLD and anxiety and PSHx of remote appendectomy with recent partial SBO presenting with dizziness, slurred speech. Pt reportedly at baseline, went to [facility] for swim lesson, felt dizzy, unsteady and had slurred speech, last known well 3:30 PM.  Pt denies headache, blurry vision. No numbness, weakness, tingling. Hypotensive per EMS 80s systolic. Pt slow to respond, denying any complaints. ROS as above.

## 2023-11-17 DIAGNOSIS — R47.81 SLURRED SPEECH: ICD-10-CM

## 2023-11-17 DIAGNOSIS — R42 DIZZINESS AND GIDDINESS: ICD-10-CM

## 2023-11-17 DIAGNOSIS — I95.9 HYPOTENSION, UNSPECIFIED: ICD-10-CM

## 2023-11-17 LAB
CULTURE RESULTS: SIGNIFICANT CHANGE UP
CULTURE RESULTS: SIGNIFICANT CHANGE UP
SPECIMEN SOURCE: SIGNIFICANT CHANGE UP
SPECIMEN SOURCE: SIGNIFICANT CHANGE UP

## 2024-02-07 NOTE — H&P ADULT - NSHPPOAPULMEMBOLUS_GEN_A_CORE
no
FAMILY HISTORY:  Father  Still living? Unknown  Family history of essential hypertension, Age at diagnosis: Age Unknown    Mother  Still living? Unknown  No family history of cardiovascular disease, Age at diagnosis: Age Unknown

## 2025-06-22 NOTE — ED ADULT NURSE NOTE - ALCOHOL PRE SCREEN (AUDIT - C)
Statement Selected UA positive with bilateral nephrostomy site pain consistent with complex UTI without clinical or radiographic signs of pyelonephritis. Has a history of both sensitive and carbapenem-resistant pseudomonas. During hospital course, worsening tachycardia and pain with white count elevating.     - Zosyn (6/10PM -6/12), Cefepime 2g Q12 (6/12-21) for 10 day course  - UCx w/ >100k GNR; pending speciation, drawn from Nephrostomy tube  - ID following, appreciate recs  - standing tylenol for pain, oxycodone prn for moderate/severe pain